# Patient Record
Sex: MALE | Race: WHITE | Employment: FULL TIME | URBAN - METROPOLITAN AREA
[De-identification: names, ages, dates, MRNs, and addresses within clinical notes are randomized per-mention and may not be internally consistent; named-entity substitution may affect disease eponyms.]

---

## 2017-10-30 ENCOUNTER — OFFICE VISIT (OUTPATIENT)
Dept: INTERNAL MEDICINE | Age: 27
End: 2017-10-30

## 2017-10-30 VITALS
BODY MASS INDEX: 34.4 KG/M2 | HEIGHT: 68 IN | OXYGEN SATURATION: 98 % | DIASTOLIC BLOOD PRESSURE: 68 MMHG | WEIGHT: 227 LBS | SYSTOLIC BLOOD PRESSURE: 136 MMHG | HEART RATE: 72 BPM

## 2017-10-30 DIAGNOSIS — D17.21 LIPOMA OF RIGHT SHOULDER: ICD-10-CM

## 2017-10-30 DIAGNOSIS — Z23 NEED FOR INFLUENZA VACCINATION: ICD-10-CM

## 2017-10-30 DIAGNOSIS — Z87.828 HISTORY OF TEAR OF ACL (ANTERIOR CRUCIATE LIGAMENT): ICD-10-CM

## 2017-10-30 DIAGNOSIS — Z00.00 ANNUAL PHYSICAL EXAM: Primary | ICD-10-CM

## 2017-10-30 DIAGNOSIS — Z98.890 HISTORY OF ELBOW SURGERY: ICD-10-CM

## 2017-10-30 DIAGNOSIS — Z00.00 ANNUAL PHYSICAL EXAM: ICD-10-CM

## 2017-10-30 LAB
A/G RATIO: 1.8 (ref 1.1–2.2)
ALBUMIN SERPL-MCNC: 4.9 G/DL (ref 3.4–5)
ALP BLD-CCNC: 37 U/L (ref 40–129)
ALT SERPL-CCNC: 27 U/L (ref 10–40)
ANION GAP SERPL CALCULATED.3IONS-SCNC: 13 MMOL/L (ref 3–16)
AST SERPL-CCNC: 22 U/L (ref 15–37)
BILIRUB SERPL-MCNC: 0.8 MG/DL (ref 0–1)
BUN BLDV-MCNC: 20 MG/DL (ref 7–20)
CALCIUM SERPL-MCNC: 9.7 MG/DL (ref 8.3–10.6)
CHLORIDE BLD-SCNC: 102 MMOL/L (ref 99–110)
CHOLESTEROL, TOTAL: 157 MG/DL (ref 0–199)
CO2: 27 MMOL/L (ref 21–32)
CREAT SERPL-MCNC: 1 MG/DL (ref 0.9–1.3)
GFR AFRICAN AMERICAN: >60
GFR NON-AFRICAN AMERICAN: >60
GLOBULIN: 2.7 G/DL
GLUCOSE BLD-MCNC: 82 MG/DL (ref 70–99)
HCT VFR BLD CALC: 47 % (ref 40.5–52.5)
HDLC SERPL-MCNC: 34 MG/DL (ref 40–60)
HEMOGLOBIN: 15.7 G/DL (ref 13.5–17.5)
LDL CHOLESTEROL CALCULATED: ABNORMAL MG/DL
LDL CHOLESTEROL DIRECT: 81 MG/DL
MCH RBC QN AUTO: 29.5 PG (ref 26–34)
MCHC RBC AUTO-ENTMCNC: 33.3 G/DL (ref 31–36)
MCV RBC AUTO: 88.6 FL (ref 80–100)
PDW BLD-RTO: 12.9 % (ref 12.4–15.4)
PLATELET # BLD: 239 K/UL (ref 135–450)
PMV BLD AUTO: 7.8 FL (ref 5–10.5)
POTASSIUM SERPL-SCNC: 4.5 MMOL/L (ref 3.5–5.1)
RBC # BLD: 5.31 M/UL (ref 4.2–5.9)
SODIUM BLD-SCNC: 142 MMOL/L (ref 136–145)
TOTAL PROTEIN: 7.6 G/DL (ref 6.4–8.2)
TRIGL SERPL-MCNC: 311 MG/DL (ref 0–150)
VLDLC SERPL CALC-MCNC: ABNORMAL MG/DL
WBC # BLD: 7.2 K/UL (ref 4–11)

## 2017-10-30 PROCEDURE — 99385 PREV VISIT NEW AGE 18-39: CPT | Performed by: INTERNAL MEDICINE

## 2017-10-30 PROCEDURE — 90630 INFLUENZA, QUADV, 18-64 YRS, ID, PF, MICRO INJ, 0.1ML (FLUZONE QUADV, PF): CPT | Performed by: INTERNAL MEDICINE

## 2017-10-30 PROCEDURE — 90471 IMMUNIZATION ADMIN: CPT | Performed by: INTERNAL MEDICINE

## 2017-10-30 ASSESSMENT — ENCOUNTER SYMPTOMS
BLOOD IN STOOL: 0
CONSTIPATION: 0
NAUSEA: 0
ABDOMINAL PAIN: 0
SHORTNESS OF BREATH: 0
CHEST TIGHTNESS: 0
DIARRHEA: 0
VOMITING: 0
COUGH: 0

## 2017-10-30 ASSESSMENT — PATIENT HEALTH QUESTIONNAIRE - PHQ9
2. FEELING DOWN, DEPRESSED OR HOPELESS: 0
SUM OF ALL RESPONSES TO PHQ9 QUESTIONS 1 & 2: 0
1. LITTLE INTEREST OR PLEASURE IN DOING THINGS: 0
SUM OF ALL RESPONSES TO PHQ QUESTIONS 1-9: 0

## 2017-10-30 NOTE — PROGRESS NOTES
appetite change, chills and fever. HENT: Negative for congestion, hearing loss and tinnitus. Eyes: Negative for visual disturbance. Respiratory: Negative for cough, chest tightness and shortness of breath. Cardiovascular: Negative for chest pain and palpitations. Gastrointestinal: Negative for abdominal pain, blood in stool, constipation, diarrhea, nausea and vomiting. Endocrine: Negative for polyuria. Genitourinary: Negative for difficulty urinating, dysuria and hematuria. Musculoskeletal: Positive for neck pain. Negative for arthralgias. Skin: Negative for rash. Neurological: Negative for weakness, numbness and headaches. Psychiatric/Behavioral: Negative for dysphoric mood, self-injury and suicidal ideas. The patient is not nervous/anxious. Screening:    Need screening for HIV ? No- he donated blood     Last eye exam: 2016    Diet is good   he runs several miles a week,   And he does wrestling. Immunization:    There is no immunization history on file for this patient. Physical Exam:      Vitals: /68 (Site: Left Arm, Position: Sitting, Cuff Size: Medium Adult)   Pulse 72   Ht 5' 8\" (1.727 m)   Wt 227 lb (103 kg)   SpO2 98%   BMI 34.52 kg/m²     Body mass index is 34.52 kg/m². Wt Readings from Last 3 Encounters:   10/30/17 227 lb (103 kg)   11/17/16 220 lb (99.8 kg)   04/07/15 200 lb (90.7 kg)     Physical Exam   Constitutional: He is oriented to person, place, and time. He appears well-developed and well-nourished. No distress. HENT:   Head: Normocephalic and atraumatic. Right Ear: External ear normal.   Left Ear: External ear normal.   Mouth/Throat: Oropharynx is clear and moist. No oropharyngeal exudate. Eyes: Conjunctivae and EOM are normal. Pupils are equal, round, and reactive to light. Right eye exhibits no discharge. Left eye exhibits no discharge. No scleral icterus. Neck: Normal range of motion. No JVD present. No thyromegaly present.    Cardiovascular: Normal rate, normal heart sounds and intact distal pulses. No murmur heard. Pulmonary/Chest: Effort normal and breath sounds normal. No respiratory distress. He has no wheezes. He has no rales. Abdominal: Soft. Bowel sounds are normal. He exhibits no distension. There is no tenderness. There is no rebound. No organomegaly  No pulsating Aorta   Musculoskeletal: Normal range of motion. He exhibits no edema or tenderness. R elbow ROM 5- 150  R knee- no effusion, lachman neg    Lymphadenopathy:     He has no cervical adenopathy. Neurological: He is alert and oriented to person, place, and time. He has normal strength. No cranial nerve deficit or sensory deficit. Gait normal. GCS eye subscore is 4. GCS verbal subscore is 5. GCS motor subscore is 6. Reflex Scores:       Tricep reflexes are 2+ on the right side and 2+ on the left side. Bicep reflexes are 2+ on the right side and 2+ on the left side. Patellar reflexes are 2+ on the right side and 2+ on the left side. Skin: No rash noted. He is not diaphoretic. No erythema. Psychiatric: He has a normal mood and affect. His behavior is normal. Thought content normal.        Assessment/Plan:   [place] was seen today for establish care. Diagnoses and all orders for this visit:    Annual physical exam  -     Comprehensive Metabolic Panel; Future  -     CBC; Future  -     Lipid Panel; Future    History of tear of ACL (anterior cruciate ligament)    History of elbow surgery    Lipoma of right shoulder  No need for intervention, I asked him to ask his orthopedic surgeon when he will see him for his neck pain. Patient was encouraged to call the office or be seen with MD for any worsening or lack of improvement.      Dustin Walker M.D.   10/30/2017, 10:23 AM

## 2017-10-31 DIAGNOSIS — E78.49 OTHER HYPERLIPIDEMIA: Primary | ICD-10-CM

## 2018-03-23 ENCOUNTER — TELEPHONE (OUTPATIENT)
Dept: INTERNAL MEDICINE | Age: 28
End: 2018-03-23

## 2018-03-23 DIAGNOSIS — R06.83 SNORING: Primary | ICD-10-CM

## 2018-05-30 ENCOUNTER — OFFICE VISIT (OUTPATIENT)
Dept: PULMONOLOGY | Age: 28
End: 2018-05-30

## 2018-05-30 VITALS
DIASTOLIC BLOOD PRESSURE: 76 MMHG | SYSTOLIC BLOOD PRESSURE: 120 MMHG | BODY MASS INDEX: 35.01 KG/M2 | OXYGEN SATURATION: 97 % | WEIGHT: 231 LBS | HEIGHT: 68 IN | HEART RATE: 70 BPM

## 2018-05-30 DIAGNOSIS — G47.10 HYPERSOMNIA: Primary | ICD-10-CM

## 2018-05-30 DIAGNOSIS — J30.9 CHRONIC ALLERGIC RHINITIS, UNSPECIFIED SEASONALITY, UNSPECIFIED TRIGGER: Chronic | ICD-10-CM

## 2018-05-30 DIAGNOSIS — K21.9 GERD WITHOUT ESOPHAGITIS: Chronic | ICD-10-CM

## 2018-05-30 DIAGNOSIS — R06.83 SNORING: ICD-10-CM

## 2018-05-30 DIAGNOSIS — E66.9 NON MORBID OBESITY, UNSPECIFIED OBESITY TYPE: Chronic | ICD-10-CM

## 2018-05-30 PROCEDURE — 99244 OFF/OP CNSLTJ NEW/EST MOD 40: CPT | Performed by: INTERNAL MEDICINE

## 2018-05-30 ASSESSMENT — ENCOUNTER SYMPTOMS
CHEST TIGHTNESS: 0
ALLERGIC/IMMUNOLOGIC NEGATIVE: 1
APNEA: 1
ABDOMINAL PAIN: 0
CHOKING: 0
SHORTNESS OF BREATH: 0
VOMITING: 0
RHINORRHEA: 0
ABDOMINAL DISTENTION: 0
PHOTOPHOBIA: 0
EYE PAIN: 0
NAUSEA: 0

## 2018-05-30 ASSESSMENT — SLEEP AND FATIGUE QUESTIONNAIRES
HOW LIKELY ARE YOU TO NOD OFF OR FALL ASLEEP WHEN YOU ARE A PASSENGER IN A CAR FOR AN HOUR WITHOUT A BREAK: 2
HOW LIKELY ARE YOU TO NOD OFF OR FALL ASLEEP IN A CAR, WHILE STOPPED FOR A FEW MINUTES IN TRAFFIC: 0
NECK CIRCUMFERENCE (INCHES): 17
HOW LIKELY ARE YOU TO NOD OFF OR FALL ASLEEP WHILE LYING DOWN TO REST IN THE AFTERNOON WHEN CIRCUMSTANCES PERMIT: 2
ESS TOTAL SCORE: 9
HOW LIKELY ARE YOU TO NOD OFF OR FALL ASLEEP WHILE SITTING AND READING: 2
HOW LIKELY ARE YOU TO NOD OFF OR FALL ASLEEP WHILE SITTING AND TALKING TO SOMEONE: 0
HOW LIKELY ARE YOU TO NOD OFF OR FALL ASLEEP WHILE WATCHING TV: 2
HOW LIKELY ARE YOU TO NOD OFF OR FALL ASLEEP WHILE SITTING INACTIVE IN A PUBLIC PLACE: 0
HOW LIKELY ARE YOU TO NOD OFF OR FALL ASLEEP WHILE SITTING QUIETLY AFTER LUNCH WITHOUT ALCOHOL: 1

## 2018-06-21 ENCOUNTER — HOSPITAL ENCOUNTER (OUTPATIENT)
Dept: SLEEP MEDICINE | Age: 28
Discharge: HOME | End: 2018-06-23
Attending: INTERNAL MEDICINE | Admitting: INTERNAL MEDICINE

## 2018-06-21 DIAGNOSIS — R06.83 SNORING: ICD-10-CM

## 2018-06-21 DIAGNOSIS — G47.10 HYPERSOMNIA: ICD-10-CM

## 2018-06-21 PROCEDURE — 95806 SLEEP STUDY UNATT&RESP EFFT: CPT | Performed by: INTERNAL MEDICINE

## 2018-07-03 ENCOUNTER — TELEPHONE (OUTPATIENT)
Dept: SLEEP MEDICINE | Age: 28
End: 2018-07-03

## 2018-07-03 NOTE — TELEPHONE ENCOUNTER
Pt returning call to review PSG and order unit. Pt does not have a DME preference and order to be sent to [facility]. F/U scheduled and pt was asked to bring unit.

## 2018-09-04 ENCOUNTER — TELEPHONE (OUTPATIENT)
Dept: INTERNAL MEDICINE | Age: 28
End: 2018-09-04

## 2018-09-04 NOTE — TELEPHONE ENCOUNTER
Patients father has issue with heart valve that may be hereditary. Patient would like to know if he can go for u/s to see if he has the defect.

## 2018-09-06 ENCOUNTER — OFFICE VISIT (OUTPATIENT)
Dept: PULMONOLOGY | Age: 28
End: 2018-09-06

## 2018-09-06 VITALS
DIASTOLIC BLOOD PRESSURE: 72 MMHG | HEART RATE: 80 BPM | BODY MASS INDEX: 34.1 KG/M2 | OXYGEN SATURATION: 98 % | WEIGHT: 225 LBS | HEIGHT: 68 IN | SYSTOLIC BLOOD PRESSURE: 122 MMHG

## 2018-09-06 DIAGNOSIS — G47.33 OSA (OBSTRUCTIVE SLEEP APNEA): Primary | ICD-10-CM

## 2018-09-06 DIAGNOSIS — K21.9 GERD WITHOUT ESOPHAGITIS: Chronic | ICD-10-CM

## 2018-09-06 DIAGNOSIS — E66.9 NON MORBID OBESITY, UNSPECIFIED OBESITY TYPE: Chronic | ICD-10-CM

## 2018-09-06 PROCEDURE — 99214 OFFICE O/P EST MOD 30 MIN: CPT | Performed by: NURSE PRACTITIONER

## 2018-09-06 ASSESSMENT — ENCOUNTER SYMPTOMS
COUGH: 0
ABDOMINAL DISTENTION: 0
APNEA: 0
ABDOMINAL PAIN: 0
RHINORRHEA: 0
SHORTNESS OF BREATH: 0
SINUS PRESSURE: 0

## 2018-09-06 ASSESSMENT — SLEEP AND FATIGUE QUESTIONNAIRES
HOW LIKELY ARE YOU TO NOD OFF OR FALL ASLEEP WHILE SITTING INACTIVE IN A PUBLIC PLACE: 0
HOW LIKELY ARE YOU TO NOD OFF OR FALL ASLEEP WHILE WATCHING TV: 2
HOW LIKELY ARE YOU TO NOD OFF OR FALL ASLEEP WHEN YOU ARE A PASSENGER IN A CAR FOR AN HOUR WITHOUT A BREAK: 1
HOW LIKELY ARE YOU TO NOD OFF OR FALL ASLEEP WHILE SITTING AND TALKING TO SOMEONE: 0
HOW LIKELY ARE YOU TO NOD OFF OR FALL ASLEEP WHILE SITTING QUIETLY AFTER LUNCH WITHOUT ALCOHOL: 1
HOW LIKELY ARE YOU TO NOD OFF OR FALL ASLEEP WHILE SITTING AND READING: 2
HOW LIKELY ARE YOU TO NOD OFF OR FALL ASLEEP WHILE LYING DOWN TO REST IN THE AFTERNOON WHEN CIRCUMSTANCES PERMIT: 2
ESS TOTAL SCORE: 8
HOW LIKELY ARE YOU TO NOD OFF OR FALL ASLEEP IN A CAR, WHILE STOPPED FOR A FEW MINUTES IN TRAFFIC: 0

## 2018-09-06 NOTE — TELEPHONE ENCOUNTER
Spoke to pt stated that his father had valve to the aorta were slightly fused together so overtime he had weak. aneurysm on the aorta. It bloomed out but did not bust. A couple years ago during a health screening they found out it and they had to replace the valve. They were concerned about the valve issue being hereditary.

## 2018-09-06 NOTE — TELEPHONE ENCOUNTER
Pt states his fathers records are in [facility] system. Pt will ask his father to release his info to us so that we can review his records.

## 2018-09-06 NOTE — PROGRESS NOTES
Josué Fontana MD, FAASM, [facility]  Baron Ding, MSN, RN, CNP [address]  3rd Floor, [address] [facility], [address] [phone]   [address] [place] [address] Dr Pricila Mcghee . Sissy Fernández [address] [phone]       [address] MEDICINE    Subjective:     Patient ID: Bronwyn Bonilla is a 33 y.o. male. Chief Complaint   Patient presents with    Sleep Apnea       HPI:      Had study Insurance mandated HST done on 6/23/2018 which showed an RHI - 5.3/hr with Low SaO2 - 91% and time below 90% of 0min. This is consistent with mild JULIAN (327.23)    Machine Present in office today: No and information per active modem      Machine Modem/Download Info:  Compliance (hours/night): 5.75 hrs/night  Download AHI (/hour): 2.3 /HR  Average CPAP Pressure : 8.3 cmH2O           APAP - Settings  Pressure Min: 7 cmH2O  Pressure Max: 17 cmH2O                 Comfort Settings  Humidity Level (0-8): 3  Heated Tubing (Yes/No): Yes  Flex/EPR (0-3): 3 PAP Mask  Mask Type: Nasal mask  Mask Model: Dreaem Wear       [place] - Total score: 8    Follow-up :     Last Visit : May 2018    Per patient the listed Co-morbidities are well controlled and stable at this time. Subjective Health Changes: None     Follow-up :      Over Night Oximetry: [] Yes  [] No  [x] NA   Using O2: [] Yes  [] No  [x] NA   Patient is compliant with the machine  [x] Yes  [] No   Feeling rested when using the machine   [x] Yes  [] No     Pressure is comfortable with inspiration and expiration  [x] Yes  [] No     Noticed changes in pressure   [] Yes  [] No  [x] NA   Mask is fitting well  [x] Yes  [] No   Noting Mask Air Leak  [] Yes  [x] No   Having painful Aerophagia  [] Yes  [x] No   Nocturia   0-1  per night.    Having  HA upon waking  [] Yes  [x] No   Dry mouth upon waking   [] Yes  [x] No   Congestion upon waking   [] Yes  [x] No    Nose Bleeds  [] Yes  [x] No   Using Sleep Aides    [x] NA

## 2018-10-02 ENCOUNTER — TELEPHONE (OUTPATIENT)
Dept: INTERNAL MEDICINE CLINIC | Age: 28
End: 2018-10-02

## 2018-10-02 DIAGNOSIS — Z82.49 FAMILY HISTORY OF AORTIC ANEURYSM: ICD-10-CM

## 2018-10-02 DIAGNOSIS — Z82.79 FAMILY HISTORY OF BICUSPID AORTIC VALVE: Primary | ICD-10-CM

## 2018-10-16 ENCOUNTER — HOSPITAL ENCOUNTER (OUTPATIENT)
Dept: NON INVASIVE DIAGNOSTICS | Age: 28
Discharge: HOME | End: 2018-10-16
Payer: COMMERCIAL

## 2018-10-16 DIAGNOSIS — Z82.49 FAMILY HISTORY OF AORTIC ANEURYSM: ICD-10-CM

## 2018-10-16 DIAGNOSIS — Z82.79 FAMILY HISTORY OF BICUSPID AORTIC VALVE: ICD-10-CM

## 2018-10-16 LAB
LEFT VENTRICULAR EJECTION FRACTION HIGH VALUE: 55 %
LEFT VENTRICULAR EJECTION FRACTION MODE: NORMAL
LV EF: 55 %
LVEF MODALITY: NORMAL

## 2018-10-16 PROCEDURE — 93306 TTE W/DOPPLER COMPLETE: CPT

## 2018-11-05 ENCOUNTER — TELEPHONE (OUTPATIENT)
Dept: INTERNAL MEDICINE CLINIC | Age: 28
End: 2018-11-05

## 2018-11-06 ENCOUNTER — OFFICE VISIT (OUTPATIENT)
Dept: INTERNAL MEDICINE CLINIC | Age: 28
End: 2018-11-06
Payer: COMMERCIAL

## 2018-11-06 VITALS
BODY MASS INDEX: 34.21 KG/M2 | HEART RATE: 77 BPM | SYSTOLIC BLOOD PRESSURE: 112 MMHG | WEIGHT: 225 LBS | DIASTOLIC BLOOD PRESSURE: 68 MMHG | OXYGEN SATURATION: 98 %

## 2018-11-06 DIAGNOSIS — K21.9 GERD WITHOUT ESOPHAGITIS: Chronic | ICD-10-CM

## 2018-11-06 DIAGNOSIS — Z00.00 ANNUAL PHYSICAL EXAM: Primary | ICD-10-CM

## 2018-11-06 DIAGNOSIS — E78.1 HYPERTRIGLYCERIDEMIA: ICD-10-CM

## 2018-11-06 DIAGNOSIS — J30.9 CHRONIC ALLERGIC RHINITIS: Chronic | ICD-10-CM

## 2018-11-06 PROCEDURE — 99395 PREV VISIT EST AGE 18-39: CPT | Performed by: INTERNAL MEDICINE

## 2018-11-06 ASSESSMENT — ENCOUNTER SYMPTOMS
BLOOD IN STOOL: 0
SHORTNESS OF BREATH: 0
CHEST TIGHTNESS: 0
NAUSEA: 0
ABDOMINAL PAIN: 0
VOMITING: 0

## 2018-11-06 ASSESSMENT — PATIENT HEALTH QUESTIONNAIRE - PHQ9
SUM OF ALL RESPONSES TO PHQ QUESTIONS 1-9: 0
SUM OF ALL RESPONSES TO PHQ QUESTIONS 1-9: 0
1. LITTLE INTEREST OR PLEASURE IN DOING THINGS: 0
2. FEELING DOWN, DEPRESSED OR HOPELESS: 0
SUM OF ALL RESPONSES TO PHQ9 QUESTIONS 1 & 2: 0

## 2018-11-06 NOTE — PROGRESS NOTES
Outpatient Note for established Patient - annual physical     History Obtained From:  patient, electronic medical record  Is the patient new to me ? - No    HISTORY OF PRESENT ILLNESS:   The patient is a 33 y.o. male who is here today for :  Radha Mathews was seen today for annual exam.    Diagnoses and all orders for this visit:    Annual physical    GERD without esophagitis  He is on Zantac daily   He takes 300 mg in the morning  He has been taking that for a few years   He had EGD ~ 2 years ago. He will try to look for the GI doctor. Pt denies CP/SOB/palpitations/abdominal pain/N/V. Chronic allergic rhinitis  Nasal congestion. Neck pain- known 2 herniated discs  He has seen a spine surgeon through [address] [place] [address]  He had injections in the past which helped transiently. He is not  Sure if he wants to resume injections  He did PT in the past and is not doing it by himself. No numbness/ weakness in hands/ legs. Last eye exam- 8/2019  He wants to be screened for HIV  He works as     L shoulder pain and neck pain  Mild  No specific trauma  No numbness/ weakness hands/ legs         Past Medical History:        Diagnosis Date    Chronic allergic rhinitis 5/30/2018    GERD without esophagitis 5/30/2018       Social History:   TOBACCO:   reports that he has never smoked. He has never used smokeless tobacco.  ETOH:   reports that he drinks about 1.2 oz of alcohol per week . Current Medications:    Prior to Admission medications    Medication Sig Start Date End Date Taking? Authorizing Provider   ranitidine (ZANTAC) 150 MG tablet Take 150 mg by mouth 2 times daily    Historical Provider, MD   cetirizine (ZYRTEC) 10 MG tablet Take 10 mg by mouth daily. Historical Provider, MD       REVIEW OF SYSTEMS:  Review of Systems   Constitutional: Negative for activity change, appetite change, chills, fever and unexpected weight change. HENT: Negative for hearing loss.     Eyes: Negative for visual disturbance. Respiratory: Negative for chest tightness and shortness of breath. Cardiovascular: Negative for chest pain. Gastrointestinal: Negative for abdominal pain, blood in stool, nausea and vomiting. Chronic GERD    Genitourinary: Negative for hematuria. Musculoskeletal: Positive for neck pain. Skin: Negative for rash. Allergic/Immunologic: Negative for immunocompromised state. Neurological: Negative for dizziness and headaches. Psychiatric/Behavioral: Negative for dysphoric mood and suicidal ideas. Physical Exam:      Vitals: /68 (Site: Left Upper Arm, Position: Sitting, Cuff Size: Large Adult)   Pulse 77   Wt 225 lb (102.1 kg)   SpO2 98%   BMI 34.21 kg/m²     Body mass index is 34.21 kg/m². Wt Readings from Last 3 Encounters:   11/06/18 225 lb (102.1 kg)   09/06/18 225 lb (102.1 kg)   05/30/18 231 lb (104.8 kg)     Physical Exam   Constitutional: He is oriented to person, place, and time. He appears well-developed and well-nourished. No distress. HENT:   Head: Normocephalic and atraumatic. Mouth/Throat: Oropharynx is clear and moist. No oropharyngeal exudate. Eyes: Conjunctivae and EOM are normal. Right eye exhibits no discharge. Left eye exhibits no discharge. No scleral icterus. Neck: Normal range of motion. Neck supple. Cardiovascular: Normal rate and normal heart sounds. No murmur heard. Pulmonary/Chest: Effort normal and breath sounds normal. No respiratory distress. He has no wheezes. He has no rales. Abdominal: Soft. He exhibits no distension. There is no tenderness. Musculoskeletal: He exhibits no deformity. Shoulder:  No swelling/ redness. No bony tenderness  Mild tenderness with IR/ ABD     Lymphadenopathy:        Head (right side): No submental and no submandibular adenopathy present. Head (left side): No submental and no submandibular adenopathy present. He has no cervical adenopathy.         Right cervical: No this exercise. 1. Hold on to a table or the back of a chair with your good arm. Then bend forward a little and let your sore arm hang straight down. This exercise does not use the arm muscles. Rather, use your legs and your hips to create movement that makes your arm swing freely. 2. Use the movement from your hips and legs to guide the slightly swinging arm back and forth like a pendulum (or elephant trunk). Then guide it in circles that start small (about the size of a dinner plate). Make the circles a bit larger each day, as your pain allows. 3. Do this exercise for 5 minutes, 5 to 7 times each day. 4. As you have less pain, try bending over a little farther to do this exercise. This will increase the amount of movement at your shoulder. Posterior stretching exercise    1. Hold the elbow of your injured arm with your other hand. 2. Use your hand to pull your injured arm gently up and across your body. You will feel a gentle stretch across the back of your injured shoulder. 3. Hold for at least 15 to 30 seconds. Then slowly lower your arm. 4. Repeat 2 to 4 times. Up-the-back stretch    Your doctor or physical therapist may want you to wait to do this stretch until you have regained most of your range of motion and strength. You can do this stretch in different ways. Hold any of these stretches for at least 15 to 30 seconds. Repeat them 2 to 4 times. 1. Put your hand in your back pocket. Let it rest there to stretch your shoulder. 2. With your other hand, hold your injured arm (palm outward) behind your back by the wrist. Pull your arm up gently to stretch your shoulder. 3. Next, put a towel over your other shoulder. Put the hand of your injured arm behind your back. Now hold the back end of the towel. With the other hand, hold the front end of the towel in front of your body. Pull gently on the front end of the towel. This will bring your hand farther up your back to stretch your shoulder.   Overhead you move your arm up. 3. Hold that position for a count of at least 15 to 20.  4. Walk your fingers back down to the starting position. 5. Repeat at least 2 to 4 times. Try to reach higher each time. Wall climbing (to the front)    During this stretching exercise, be careful not to arch your back. 1. Face a wall, and stand so your fingers can just touch it. 2. Keeping your shoulder down, walk the fingers of your injured arm up the wall as high as pain permits. (Don't shrug your shoulder up toward your ear.)  3. Hold your arm in that position for at least 15 to 30 seconds. 4. Slowly walk your fingers back down to where you started. 5. Repeat at least 2 to 4 times. Try to reach higher each time. Shoulder blade squeeze    1. Stand with your arms at your sides, and squeeze your shoulder blades together. Do not raise your shoulders up as you squeeze. 2. Hold 6 seconds. 3. Repeat 8 to 12 times. Scapular exercise: Arm reach    1. Lie flat on your back. This exercise is a very slight motion that starts with your arms raised (elbows straight, arms straight). 2. From this position, reach higher toward the willard or ceiling. Keep your elbows straight. All motion should be from your shoulder blade only. 3. Relax your arms back to where you started. 4. Repeat 8 to 12 times. Arm raise to the side    During this strengthening exercise, your arm should stay about 30 degrees to the front of your side. 1. Slowly raise your injured arm to the side, with your thumb facing up. Raise your arm 60 degrees at the most (shoulder level is 90 degrees). 2. Hold the position for 3 to 5 seconds. Then lower your arm back to your side. If you need to, bring your \"good\" arm across your body and place it under the elbow as you lower your injured arm. Use your good arm to keep your injured arm from dropping down too fast.  3. Repeat 8 to 12 times. 4. When you first start out, don't hold any extra weight in your hand.  As you get stronger, you may use a 1-pound to 2-pound dumbbell or a small can of food. Shoulder flexor and extensor exercise    These are isometric exercises. That means you contract your muscles without actually moving. 1. Push forward (flex): Stand facing a wall or doorjamb, about 6 inches or less back. Hold your injured arm against your body. Make a closed fist with your thumb on top. Then gently push your hand forward into the wall with about 25% to 50% of your strength. Don't let your body move backward as you push. Hold for about 6 seconds. Relax for a few seconds. Repeat 8 to 12 times. 2. Push backward (extend): Stand with your back flat against a wall. Your upper arm should be against the wall, with your elbow bent 90 degrees (your hand straight ahead). Push your elbow gently back against the wall with about 25% to 50% of your strength. Don't let your body move forward as you push. Hold for about 6 seconds. Relax for a few seconds. Repeat 8 to 12 times. Scapular exercise: Wall push-ups    This exercise is best done with your fingers somewhat turned out, rather than straight up and down. 1. Stand facing a wall, about 12 inches to 18 inches away. 2. Place your hands on the wall at shoulder height. 3. Slowly bend your elbows and bring your face to the wall. Keep your back and hips straight. 4. Push back to where you started. 5. Repeat 8 to 12 times. 6. When you can do this exercise against a wall comfortably, you can try it against a counter. You can then slowly progress to the end of a couch, then to a sturdy chair, and finally to the floor. Scapular exercise: Retraction    For this exercise, you will need elastic exercise material, such as surgical tubing or Thera-Band. 1. Put the band around a solid object at about waist level. (A bedpost will work well.) Each hand should hold an end of the band. 2. With your elbows at your sides and bent to 90 degrees, pull the band back.  Your shoulder blades should move times.  Follow-up care is a key part of your treatment and safety. Be sure to make and go to all appointments, and call your doctor if you are having problems. It's also a good idea to know your test results and keep a list of the medicines you take. Where can you learn more? Go to https://chpepiceweb.iodine. org and sign in to your [employer] account. Enter Cooper Mcdonald in the [employer]ashFolio box to learn more about \"Rotator Cuff: Exercises. \"     If you do not have an account, please click on the \"Sign Up Now\" link. Current as of: November 29, 2017  Content Version: 11.7  © 7218-3687 [employer]. Care instructions adapted under license by [facility] ([facility]). If you have questions about a medical condition or this instruction, always ask your healthcare professional. [place] [address] any warranty or liability for your use of this information.            Suzanne Osgood, M.D.   11/16/2018, 6:20 PM

## 2019-03-14 ENCOUNTER — OFFICE VISIT (OUTPATIENT)
Dept: PULMONOLOGY | Age: 29
End: 2019-03-14
Payer: COMMERCIAL

## 2019-03-14 VITALS
OXYGEN SATURATION: 99 % | WEIGHT: 221 LBS | HEART RATE: 60 BPM | HEIGHT: 68 IN | BODY MASS INDEX: 33.49 KG/M2 | SYSTOLIC BLOOD PRESSURE: 122 MMHG | DIASTOLIC BLOOD PRESSURE: 72 MMHG

## 2019-03-14 DIAGNOSIS — E66.9 NON MORBID OBESITY, UNSPECIFIED OBESITY TYPE: Chronic | ICD-10-CM

## 2019-03-14 DIAGNOSIS — K21.9 GERD WITHOUT ESOPHAGITIS: Chronic | ICD-10-CM

## 2019-03-14 DIAGNOSIS — G47.33 OSA (OBSTRUCTIVE SLEEP APNEA): Primary | ICD-10-CM

## 2019-03-14 DIAGNOSIS — J30.9 CHRONIC ALLERGIC RHINITIS: Chronic | ICD-10-CM

## 2019-03-14 PROCEDURE — 99214 OFFICE O/P EST MOD 30 MIN: CPT | Performed by: NURSE PRACTITIONER

## 2019-03-14 ASSESSMENT — ENCOUNTER SYMPTOMS
COUGH: 0
APNEA: 0
EYE REDNESS: 0
SHORTNESS OF BREATH: 0
ABDOMINAL PAIN: 0
EYE PAIN: 0
RHINORRHEA: 0
ABDOMINAL DISTENTION: 0
SINUS PRESSURE: 0

## 2019-03-14 ASSESSMENT — SLEEP AND FATIGUE QUESTIONNAIRES
HOW LIKELY ARE YOU TO NOD OFF OR FALL ASLEEP IN A CAR, WHILE STOPPED FOR A FEW MINUTES IN TRAFFIC: 0
HOW LIKELY ARE YOU TO NOD OFF OR FALL ASLEEP WHILE LYING DOWN TO REST IN THE AFTERNOON WHEN CIRCUMSTANCES PERMIT: 1
HOW LIKELY ARE YOU TO NOD OFF OR FALL ASLEEP WHILE SITTING AND READING: 1
HOW LIKELY ARE YOU TO NOD OFF OR FALL ASLEEP WHILE SITTING QUIETLY AFTER LUNCH WITHOUT ALCOHOL: 0
ESS TOTAL SCORE: 4
HOW LIKELY ARE YOU TO NOD OFF OR FALL ASLEEP WHILE SITTING INACTIVE IN A PUBLIC PLACE: 0
HOW LIKELY ARE YOU TO NOD OFF OR FALL ASLEEP WHILE WATCHING TV: 1
HOW LIKELY ARE YOU TO NOD OFF OR FALL ASLEEP WHILE SITTING AND TALKING TO SOMEONE: 0
HOW LIKELY ARE YOU TO NOD OFF OR FALL ASLEEP WHEN YOU ARE A PASSENGER IN A CAR FOR AN HOUR WITHOUT A BREAK: 1

## 2019-04-22 ENCOUNTER — TELEPHONE (OUTPATIENT)
Dept: PULMONOLOGY | Age: 29
End: 2019-04-22

## 2019-04-29 ENCOUNTER — PATIENT MESSAGE (OUTPATIENT)
Dept: INTERNAL MEDICINE CLINIC | Age: 29
End: 2019-04-29

## 2019-04-30 ENCOUNTER — TELEPHONE (OUTPATIENT)
Dept: INTERNAL MEDICINE CLINIC | Age: 29
End: 2019-04-30

## 2019-04-30 NOTE — TELEPHONE ENCOUNTER
Non-Urgent Medical Question     From  Magda Olmedo To  [facility] Practice Support Sent  4/29/2019  6:21 PM   ----- Message from [address] [place] [address] [address], [address] sent at 4/29/2019  6:21 PM EDT -----     Ruben,   Would I be able to request a referral to a dermatologist? For several years now I have had a bump on my right shoulder that I believe is a sebaceous cyst. It has never bothered me so I have left it alone. Recently I've decided it is time to have it looked at and taken care of.    Thank you,   Max Singletary

## 2019-06-10 ENCOUNTER — OFFICE VISIT (OUTPATIENT)
Dept: INTERNAL MEDICINE CLINIC | Age: 29
End: 2019-06-10
Payer: COMMERCIAL

## 2019-06-10 VITALS
DIASTOLIC BLOOD PRESSURE: 74 MMHG | BODY MASS INDEX: 32.58 KG/M2 | WEIGHT: 215 LBS | HEIGHT: 68 IN | HEART RATE: 70 BPM | SYSTOLIC BLOOD PRESSURE: 130 MMHG | OXYGEN SATURATION: 99 % | RESPIRATION RATE: 14 BRPM

## 2019-06-10 DIAGNOSIS — S16.1XXA ACUTE STRAIN OF NECK MUSCLE, INITIAL ENCOUNTER: Primary | ICD-10-CM

## 2019-06-10 PROCEDURE — 99213 OFFICE O/P EST LOW 20 MIN: CPT | Performed by: INTERNAL MEDICINE

## 2019-06-10 RX ORDER — NAPROXEN SODIUM 220 MG
220 TABLET ORAL 2 TIMES DAILY PRN
COMMUNITY
End: 2020-11-02

## 2019-06-10 ASSESSMENT — ENCOUNTER SYMPTOMS
NAUSEA: 0
CHEST TIGHTNESS: 0
SHORTNESS OF BREATH: 0
ABDOMINAL PAIN: 0
EYE REDNESS: 0
BACK PAIN: 0

## 2019-06-10 ASSESSMENT — PATIENT HEALTH QUESTIONNAIRE - PHQ9
SUM OF ALL RESPONSES TO PHQ QUESTIONS 1-9: 0
2. FEELING DOWN, DEPRESSED OR HOPELESS: 0
1. LITTLE INTEREST OR PLEASURE IN DOING THINGS: 0
SUM OF ALL RESPONSES TO PHQ QUESTIONS 1-9: 0
SUM OF ALL RESPONSES TO PHQ9 QUESTIONS 1 & 2: 0
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
## 2019-09-26 ENCOUNTER — OFFICE VISIT (OUTPATIENT)
Dept: PULMONOLOGY | Age: 29
End: 2019-09-26
Payer: COMMERCIAL

## 2019-09-26 VITALS
SYSTOLIC BLOOD PRESSURE: 112 MMHG | WEIGHT: 215 LBS | DIASTOLIC BLOOD PRESSURE: 70 MMHG | HEIGHT: 68 IN | HEART RATE: 61 BPM | OXYGEN SATURATION: 99 % | BODY MASS INDEX: 32.58 KG/M2

## 2019-09-26 DIAGNOSIS — E66.9 NON MORBID OBESITY, UNSPECIFIED OBESITY TYPE: Chronic | ICD-10-CM

## 2019-09-26 DIAGNOSIS — G47.33 OSA (OBSTRUCTIVE SLEEP APNEA): Primary | ICD-10-CM

## 2019-09-26 DIAGNOSIS — J30.9 CHRONIC ALLERGIC RHINITIS: Chronic | ICD-10-CM

## 2019-09-26 DIAGNOSIS — K21.9 GERD WITHOUT ESOPHAGITIS: Chronic | ICD-10-CM

## 2019-09-26 PROCEDURE — 99214 OFFICE O/P EST MOD 30 MIN: CPT | Performed by: NURSE PRACTITIONER

## 2019-09-26 ASSESSMENT — ENCOUNTER SYMPTOMS
ABDOMINAL PAIN: 0
EYE PAIN: 0
COUGH: 0
SHORTNESS OF BREATH: 0
RHINORRHEA: 0
SINUS PRESSURE: 0
EYE REDNESS: 0
APNEA: 0
ABDOMINAL DISTENTION: 0

## 2019-09-26 ASSESSMENT — SLEEP AND FATIGUE QUESTIONNAIRES
ESS TOTAL SCORE: 4
HOW LIKELY ARE YOU TO NOD OFF OR FALL ASLEEP WHILE SITTING INACTIVE IN A PUBLIC PLACE: 0
HOW LIKELY ARE YOU TO NOD OFF OR FALL ASLEEP WHILE SITTING AND TALKING TO SOMEONE: 0
HOW LIKELY ARE YOU TO NOD OFF OR FALL ASLEEP IN A CAR, WHILE STOPPED FOR A FEW MINUTES IN TRAFFIC: 0
HOW LIKELY ARE YOU TO NOD OFF OR FALL ASLEEP WHILE SITTING AND READING: 1
HOW LIKELY ARE YOU TO NOD OFF OR FALL ASLEEP WHEN YOU ARE A PASSENGER IN A CAR FOR AN HOUR WITHOUT A BREAK: 0
HOW LIKELY ARE YOU TO NOD OFF OR FALL ASLEEP WHILE WATCHING TV: 1
HOW LIKELY ARE YOU TO NOD OFF OR FALL ASLEEP WHILE LYING DOWN TO REST IN THE AFTERNOON WHEN CIRCUMSTANCES PERMIT: 1
HOW LIKELY ARE YOU TO NOD OFF OR FALL ASLEEP WHILE SITTING QUIETLY AFTER LUNCH WITHOUT ALCOHOL: 1

## 2019-09-26 NOTE — PROGRESS NOTES
Myles Powell MD, FAASM, [facility]  Estrella Thomas, MSN, RN, CNP     [address] [facility] SLEEP MEDICINE  [place] [address] [address] [facility] [address]  Dept: [phone]  Dept Fax: [phone]: [facility] SLEEP [facility]IC[facility]  [address] [place] [id number] [phone]    Subjective:     Patient ID: Clyde Schultz is a 30 y.o. male. Chief Complaint   Patient presents with    Sleep Apnea       HPI:      Machine Present today:  No SD card was brought to the office    Machine Modem/Download Info:  Compliance (hours/night): 6 hrs/night  Download AHI (/hour): 1.9 /HR  Average CPAP Pressure : 8.8 cmH2O           APAP - Settings  Pressure Min: 7 cmH2O  Pressure Max: 17 cmH2O                 Comfort Settings  Humidity Level (0-8): 5  Flex/EPR (0-3): 3       [place] - Total score: 4    Follow-up :     Last Visit : March 2019      Patient reports the listed chronic Co-morbidities: GERD, Obesity, Allergies    are well controlled and stable at this time. Subjective Health Changes: none     Over Night Oximetry: [] Yes  [] No  [x] NA   Using O2: [] Yes  [] No  [x] NA   Patient is compliant with the machine  [x] Yes  [] No   Feeling rested when using the machine   [x] Yes  [] No     Pressure is comfortable with inspiration and expiration  [x] Yes  [] No     Noticed changes in pressure   [] Yes  [] No  [x] NA   Mask is fitting well  [x] Yes  [] No   Noting Mask Air Leak  [] Yes  [x] No   Having painful Aerophagia  [] Yes  [x] No   Nocturia   0  per night.    Having  HA upon waking  [] Yes  [x] No   Dry mouth upon waking   Dry Nose  Dry Eyes  [x] Yes  [] No   Congestion upon waking   [x] Yes  [] No    Nose Bleeds  [] Yes  [x] No   Using Sleep Aides    [x] NA   Understands how to change humidification and/or tubing temperature for comfort while at home  [x] Yes  [] No     Difficulties falling asleep  [] Yes  [x] No   Difficulties

## 2019-11-20 ENCOUNTER — OFFICE VISIT (OUTPATIENT)
Dept: INTERNAL MEDICINE CLINIC | Age: 29
End: 2019-11-20
Payer: COMMERCIAL

## 2019-11-20 VITALS
HEART RATE: 73 BPM | WEIGHT: 214 LBS | BODY MASS INDEX: 32.54 KG/M2 | SYSTOLIC BLOOD PRESSURE: 122 MMHG | OXYGEN SATURATION: 98 % | DIASTOLIC BLOOD PRESSURE: 82 MMHG

## 2019-11-20 DIAGNOSIS — E66.9 NON MORBID OBESITY, UNSPECIFIED OBESITY TYPE: Chronic | ICD-10-CM

## 2019-11-20 DIAGNOSIS — G47.33 OSA (OBSTRUCTIVE SLEEP APNEA): ICD-10-CM

## 2019-11-20 DIAGNOSIS — Z11.4 SCREENING FOR HIV (HUMAN IMMUNODEFICIENCY VIRUS): ICD-10-CM

## 2019-11-20 DIAGNOSIS — Z23 NEED FOR PROPHYLACTIC VACCINATION AGAINST DIPHTHERIA-TETANUS-PERTUSSIS (DTP): ICD-10-CM

## 2019-11-20 DIAGNOSIS — Z00.00 ANNUAL PHYSICAL EXAM: ICD-10-CM

## 2019-11-20 DIAGNOSIS — M54.2 NECK PAIN: ICD-10-CM

## 2019-11-20 DIAGNOSIS — G47.10 HYPERSOMNIA: ICD-10-CM

## 2019-11-20 DIAGNOSIS — K21.9 GERD WITHOUT ESOPHAGITIS: Chronic | ICD-10-CM

## 2019-11-20 DIAGNOSIS — Z00.00 ANNUAL PHYSICAL EXAM: Primary | ICD-10-CM

## 2019-11-20 LAB
A/G RATIO: 1.9 (ref 1.1–2.2)
ALBUMIN SERPL-MCNC: 4.7 G/DL (ref 3.4–5)
ALP BLD-CCNC: 27 U/L (ref 40–129)
ALT SERPL-CCNC: 23 U/L (ref 10–40)
ANION GAP SERPL CALCULATED.3IONS-SCNC: 13 MMOL/L (ref 3–16)
AST SERPL-CCNC: 22 U/L (ref 15–37)
BASOPHILS ABSOLUTE: 0 K/UL (ref 0–0.2)
BASOPHILS RELATIVE PERCENT: 0.7 %
BILIRUB SERPL-MCNC: 1 MG/DL (ref 0–1)
BUN BLDV-MCNC: 18 MG/DL (ref 7–20)
CALCIUM SERPL-MCNC: 9.2 MG/DL (ref 8.3–10.6)
CHLORIDE BLD-SCNC: 105 MMOL/L (ref 99–110)
CHOLESTEROL, TOTAL: 154 MG/DL (ref 0–199)
CO2: 25 MMOL/L (ref 21–32)
CREAT SERPL-MCNC: 1.1 MG/DL (ref 0.9–1.3)
EOSINOPHILS ABSOLUTE: 0.2 K/UL (ref 0–0.6)
EOSINOPHILS RELATIVE PERCENT: 3.8 %
GFR AFRICAN AMERICAN: >60
GFR NON-AFRICAN AMERICAN: >60
GLOBULIN: 2.5 G/DL
GLUCOSE BLD-MCNC: 83 MG/DL (ref 70–99)
HCT VFR BLD CALC: 41.4 % (ref 40.5–52.5)
HDLC SERPL-MCNC: 42 MG/DL (ref 40–60)
HEMOGLOBIN: 13.9 G/DL (ref 13.5–17.5)
LDL CHOLESTEROL CALCULATED: 88 MG/DL
LYMPHOCYTES ABSOLUTE: 1.3 K/UL (ref 1–5.1)
LYMPHOCYTES RELATIVE PERCENT: 28.4 %
MCH RBC QN AUTO: 29.2 PG (ref 26–34)
MCHC RBC AUTO-ENTMCNC: 33.7 G/DL (ref 31–36)
MCV RBC AUTO: 86.6 FL (ref 80–100)
MONOCYTES ABSOLUTE: 0.4 K/UL (ref 0–1.3)
MONOCYTES RELATIVE PERCENT: 9.4 %
NEUTROPHILS ABSOLUTE: 2.6 K/UL (ref 1.7–7.7)
NEUTROPHILS RELATIVE PERCENT: 57.7 %
PDW BLD-RTO: 13 % (ref 12.4–15.4)
PLATELET # BLD: 207 K/UL (ref 135–450)
PMV BLD AUTO: 7.6 FL (ref 5–10.5)
POTASSIUM SERPL-SCNC: 4.4 MMOL/L (ref 3.5–5.1)
RBC # BLD: 4.78 M/UL (ref 4.2–5.9)
SODIUM BLD-SCNC: 143 MMOL/L (ref 136–145)
TOTAL PROTEIN: 7.2 G/DL (ref 6.4–8.2)
TRIGL SERPL-MCNC: 122 MG/DL (ref 0–150)
VLDLC SERPL CALC-MCNC: 24 MG/DL
WBC # BLD: 4.6 K/UL (ref 4–11)

## 2019-11-20 PROCEDURE — 99395 PREV VISIT EST AGE 18-39: CPT | Performed by: INTERNAL MEDICINE

## 2019-11-20 PROCEDURE — 90715 TDAP VACCINE 7 YRS/> IM: CPT | Performed by: INTERNAL MEDICINE

## 2019-11-20 PROCEDURE — 90471 IMMUNIZATION ADMIN: CPT | Performed by: INTERNAL MEDICINE

## 2019-11-20 ASSESSMENT — ENCOUNTER SYMPTOMS
CONSTIPATION: 0
BLOOD IN STOOL: 0
ABDOMINAL PAIN: 0
CHEST TIGHTNESS: 0
SHORTNESS OF BREATH: 0
VOMITING: 0
BACK PAIN: 1
DIARRHEA: 0
NAUSEA: 0

## 2019-11-21 ENCOUNTER — TELEPHONE (OUTPATIENT)
Dept: INTERNAL MEDICINE CLINIC | Age: 29
End: 2019-11-21

## 2019-11-21 ENCOUNTER — PATIENT MESSAGE (OUTPATIENT)
Dept: INTERNAL MEDICINE CLINIC | Age: 29
End: 2019-11-21

## 2019-11-21 LAB
HIV AG/AB: NORMAL
HIV ANTIGEN: NORMAL
HIV-1 ANTIBODY: NORMAL
HIV-2 AB: NORMAL

## 2019-11-26 ENCOUNTER — TELEPHONE (OUTPATIENT)
Dept: INTERNAL MEDICINE CLINIC | Age: 29
End: 2019-11-26

## 2019-11-27 ENCOUNTER — TELEPHONE (OUTPATIENT)
Dept: INTERNAL MEDICINE CLINIC | Age: 29
End: 2019-11-27

## 2019-12-06 ENCOUNTER — TELEPHONE (OUTPATIENT)
Dept: INTERNAL MEDICINE CLINIC | Age: 29
End: 2019-12-06

## 2019-12-06 ENCOUNTER — PATIENT MESSAGE (OUTPATIENT)
Dept: INTERNAL MEDICINE CLINIC | Age: 29
End: 2019-12-06

## 2019-12-12 ENCOUNTER — TELEPHONE (OUTPATIENT)
Dept: INTERNAL MEDICINE CLINIC | Age: 29
End: 2019-12-12

## 2020-02-23 ENCOUNTER — HOSPITAL ENCOUNTER (OUTPATIENT)
Dept: SLEEP CENTER | Age: 30
Discharge: HOME | End: 2020-02-23
Payer: COMMERCIAL

## 2020-02-23 PROCEDURE — 95810 POLYSOM 6/> YRS 4/> PARAM: CPT

## 2020-02-23 PROCEDURE — 95810 POLYSOM 6/> YRS 4/> PARAM: CPT | Performed by: INTERNAL MEDICINE

## 2020-02-26 ENCOUNTER — OFFICE VISIT (OUTPATIENT)
Dept: PULMONOLOGY | Age: 30
End: 2020-02-26
Payer: COMMERCIAL

## 2020-02-26 VITALS
HEART RATE: 73 BPM | SYSTOLIC BLOOD PRESSURE: 132 MMHG | WEIGHT: 217 LBS | DIASTOLIC BLOOD PRESSURE: 80 MMHG | HEIGHT: 68 IN | BODY MASS INDEX: 32.89 KG/M2 | OXYGEN SATURATION: 99 %

## 2020-02-26 PROBLEM — G47.33 OSA (OBSTRUCTIVE SLEEP APNEA): Chronic | Status: ACTIVE | Noted: 2019-03-14

## 2020-02-26 PROCEDURE — 99214 OFFICE O/P EST MOD 30 MIN: CPT | Performed by: INTERNAL MEDICINE

## 2020-02-26 ASSESSMENT — SLEEP AND FATIGUE QUESTIONNAIRES
HOW LIKELY ARE YOU TO NOD OFF OR FALL ASLEEP WHILE SITTING AND READING: 1
HOW LIKELY ARE YOU TO NOD OFF OR FALL ASLEEP WHILE LYING DOWN TO REST IN THE AFTERNOON WHEN CIRCUMSTANCES PERMIT: 1
HOW LIKELY ARE YOU TO NOD OFF OR FALL ASLEEP WHEN YOU ARE A PASSENGER IN A CAR FOR AN HOUR WITHOUT A BREAK: 1
HOW LIKELY ARE YOU TO NOD OFF OR FALL ASLEEP WHILE WATCHING TV: 1
HOW LIKELY ARE YOU TO NOD OFF OR FALL ASLEEP IN A CAR, WHILE STOPPED FOR A FEW MINUTES IN TRAFFIC: 0
HOW LIKELY ARE YOU TO NOD OFF OR FALL ASLEEP WHILE SITTING AND TALKING TO SOMEONE: 0
HOW LIKELY ARE YOU TO NOD OFF OR FALL ASLEEP WHILE SITTING INACTIVE IN A PUBLIC PLACE: 0
HOW LIKELY ARE YOU TO NOD OFF OR FALL ASLEEP WHILE SITTING QUIETLY AFTER LUNCH WITHOUT ALCOHOL: 0
ESS TOTAL SCORE: 4

## 2020-02-26 ASSESSMENT — ENCOUNTER SYMPTOMS
CHEST TIGHTNESS: 0
SHORTNESS OF BREATH: 0
STRIDOR: 0
SINUS PRESSURE: 0
PHOTOPHOBIA: 0
EYE PAIN: 0

## 2020-02-26 NOTE — LETTER
[facility] Sleep Medicine  [place] [address] [address] [address]  Phone: [phone]  Fax: [phone]    February 26, 2020       Patient: Ann Marie Shen   MR Number: [id number]   YOB: 1990   Date of Visit: 2/26/2020       Manas Sanderson was seen for a follow up visit today. Here is my assessment and plan as well as an attached copy of his visit today:    JULIAN (obstructive sleep apnea)  Chronic- Stable. Reviewed compliance download with pt. Supplies and parts as needed for his machine. These are medically necessary. Continue medications per his PCP and other physicians. Limit caffeine use after 3pm. Patient is meeting FAA criteria for obstructive sleep apnea treatment. 6 month FAA compliance checks. GERD without esophagitis  Chronic- Stable. Cont meds per PCP and other physicians. Chronic allergic rhinitis  Chronic- Stable. Cont meds per PCP and other physicians. Non morbid obesity, unspecified obesity type  Chronic-Stable. Encouraged him to work on weight loss through diet and exercise. If you have questions or concerns, please do not hesitate to call me. I look forward to following Maria Esther Lam along with you. Sincerely,    Skinny Cohen MD    CC providers:  Ngozi Cazares MD  [address] [facility] [address] Rosenda Rhodes

## 2020-02-26 NOTE — ASSESSMENT & PLAN NOTE
Chronic- Stable. Reviewed compliance download with pt. Supplies and parts as needed for his machine. These are medically necessary. Continue medications per his PCP and other physicians. Limit caffeine use after 3pm. Patient is meeting FAA criteria for obstructive sleep apnea treatment. 6 month FAA compliance checks.

## 2020-02-26 NOTE — PROGRESS NOTES
Nargis Matthews MD, FAASM, [facility]  Palak Diamond [address]  3rd Floor, [facility], [facility] [address] [phone]   [facility] [address]. [address] [place] [address]. [address] [phone]     [place] [address]  Dept: [phone]  Loc: [phone]    Assessment/Plan:     JULIAN (obstructive sleep apnea)  Chronic- Stable. Reviewed compliance download with pt. Supplies and parts as needed for his machine. These are medically necessary. Continue medications per his PCP and other physicians. Limit caffeine use after 3pm. Patient is meeting FAA criteria for obstructive sleep apnea treatment. 6 month FAA compliance checks. GERD without esophagitis  Chronic- Stable. Cont meds per PCP and other physicians. Chronic allergic rhinitis  Chronic- Stable. Cont meds per PCP and other physicians. Non morbid obesity, unspecified obesity type  Chronic-Stable. Encouraged him to work on weight loss through diet and exercise. Diagnoses of JULIAN (obstructive sleep apnea), GERD without esophagitis, Chronic allergic rhinitis, and Non morbid obesity, unspecified obesity type were pertinent to this visit. The chronic medical conditions listed are directly related to the primary diagnosis listed above. The management of the primary diagnosis affects the secondary diagnosis and vice versa. Subjective:     Patient ID: Elier Francisco is a 35 y.o. male. Chief Complaint   Patient presents with    Sleep Apnea     Subjective   HPI:    Machine Modem/Download Info:  Compliance (hours/night): 3 hrs/night  Download AHI (/hour): 1.8 /HR  Average CPAP Pressure : 8.8 cmH2O APAP - Settings  Pressure Min: 7 cmH2O  Pressure Max: 17 cmH2O     Comfort Settings  Humidity Level (0-8): 5       JULIAN: He continues to do well with his machine at the current settings. No issues with EDS, snoring, or apneas.   He is waking refreshed, for the most part, in the am.  No HA, dryness, or congestion. No issues using his machine. Not drowsy while driving or flying. Gastroesophageal reflux disease, allergic rhinitis, and obesity: stable on meds and followed by pt's PCP and other physicians. [facility]    [place] - Total score: 4    Social History     Socioeconomic History    Marital status: Single     Spouse name: Not on file    Number of children: Not on file    Years of education: Not on file    Highest education level: Not on file   Occupational History    Not on file   Social Needs    Financial resource strain: Not on file    Food insecurity:     Worry: Not on file     Inability: Not on file    Transportation needs:     Medical: Not on file     Non-medical: Not on file   Tobacco Use    Smoking status: Never Smoker    Smokeless tobacco: Never Used   Substance and Sexual Activity    Alcohol use:  Yes     Alcohol/week: 2.0 standard drinks     Types: 2 Cans of beer per week     Comment: occassionally    Drug use: Not on file    Sexual activity: Never   Lifestyle    Physical activity:     Days per week: Not on file     Minutes per session: Not on file    Stress: Not on file   Relationships    Social connections:     Talks on phone: Not on file     Gets together: Not on file     Attends Sikh service: Not on file     Active member of club or organization: Not on file     Attends meetings of clubs or organizations: Not on file     Relationship status: Not on file    Intimate partner violence:     Fear of current or ex partner: Not on file     Emotionally abused: Not on file     Physically abused: Not on file     Forced sexual activity: Not on file   Other Topics Concern    Not on file   Social History Narrative    Not on file       Current Outpatient Medications   Medication Sig Dispense Refill    naproxen sodium (ALEVE) 220 MG tablet Take 220 mg by mouth 2 times daily as needed       ranitidine (ZANTAC)

## 2020-07-20 ENCOUNTER — OFFICE VISIT (OUTPATIENT)
Dept: PRIMARY CARE CLINIC | Age: 30
End: 2020-07-20
Payer: COMMERCIAL

## 2020-07-20 PROCEDURE — 99211 OFF/OP EST MAY X REQ PHY/QHP: CPT | Performed by: NURSE PRACTITIONER

## 2020-07-20 NOTE — PROGRESS NOTES
Jorge Alberto Whitley received a viral test for COVID-19. They were educated on isolation and quarantine as appropriate. For any symptoms, they were directed to seek care from their PCP, given contact information to establish with a doctor, directed to an urgent care or the emergency room.

## 2020-07-25 LAB
SARS-COV-2: NOT DETECTED
SOURCE: NORMAL

## 2020-08-26 ENCOUNTER — VIRTUAL VISIT (OUTPATIENT)
Dept: PULMONOLOGY | Age: 30
End: 2020-08-26
Payer: COMMERCIAL

## 2020-08-26 PROCEDURE — 99214 OFFICE O/P EST MOD 30 MIN: CPT | Performed by: INTERNAL MEDICINE

## 2020-08-26 RX ORDER — FAMOTIDINE 20 MG/1
20 TABLET, FILM COATED ORAL 2 TIMES DAILY
COMMUNITY

## 2020-08-26 ASSESSMENT — SLEEP AND FATIGUE QUESTIONNAIRES
ESS TOTAL SCORE: 4
HOW LIKELY ARE YOU TO NOD OFF OR FALL ASLEEP WHILE SITTING AND TALKING TO SOMEONE: 0
HOW LIKELY ARE YOU TO NOD OFF OR FALL ASLEEP WHILE WATCHING TV: 1
HOW LIKELY ARE YOU TO NOD OFF OR FALL ASLEEP WHEN YOU ARE A PASSENGER IN A CAR FOR AN HOUR WITHOUT A BREAK: 0
HOW LIKELY ARE YOU TO NOD OFF OR FALL ASLEEP WHILE LYING DOWN TO REST IN THE AFTERNOON WHEN CIRCUMSTANCES PERMIT: 1
HOW LIKELY ARE YOU TO NOD OFF OR FALL ASLEEP WHILE SITTING QUIETLY AFTER LUNCH WITHOUT ALCOHOL: 1
HOW LIKELY ARE YOU TO NOD OFF OR FALL ASLEEP WHILE SITTING AND READING: 1
HOW LIKELY ARE YOU TO NOD OFF OR FALL ASLEEP IN A CAR, WHILE STOPPED FOR A FEW MINUTES IN TRAFFIC: 0
HOW LIKELY ARE YOU TO NOD OFF OR FALL ASLEEP WHILE SITTING INACTIVE IN A PUBLIC PLACE: 0

## 2020-08-26 NOTE — ASSESSMENT & PLAN NOTE
Chronic- Stable. Reviewed compliance download with pt. Supplies and parts as needed for his machine. These are medically necessary. Continue medications per his PCP and other physicians. Limit caffeine use after 3pm. Patient is meeting FAA criteria for obstructive sleep apnea treatment. No restrictions from sleep med standpoint for flying with continued compliant use of his machine. 6 month FAA compliance checks.

## 2020-08-26 NOTE — PROGRESS NOTES
Fabian Torres MD, [facility], [facility]  Tiffanie Kehrt [address]  3rd Floor, [address] [facility], [address] [phone]   [facility] [address]. [address] [facility]. [address] [phone]     Video Visit- Follow up    Pursuant to the emergency declaration under the [address] [place] [address], [facility] waiver authority and the [employer] Act, this Virtual  Visit was conducted, with patient's consent, to reduce the patient's risk of exposure to COVID-19 and provide continuity of care for an established patient. Services were provided through a video synchronous discussion virtually to substitute for in-person clinic visit. Patient was located in their home. Assessment/Plan:     JULIAN (obstructive sleep apnea)  Chronic- Stable. Reviewed compliance download with pt. Supplies and parts as needed for his machine. These are medically necessary. Continue medications per his PCP and other physicians. Limit caffeine use after 3pm. Patient is meeting FAA criteria for obstructive sleep apnea treatment. No restrictions from sleep med standpoint for flying with continued compliant use of his machine. 6 month FAA compliance checks. GERD without esophagitis  Chronic- Stable. Cont meds per PCP and other physicians. Chronic allergic rhinitis  Chronic- Stable. Cont meds per PCP and other physicians. Non morbid obesity, unspecified obesity type  Chronic- Stable. Cont meds per PCP and other physicians. Diagnoses of JULIAN (obstructive sleep apnea), GERD without esophagitis, Chronic allergic rhinitis, and Non morbid obesity, unspecified obesity type were pertinent to this visit. The chronic medical conditions listed are directly related to the primary diagnosis listed above. The management of the primary diagnosis affects the secondary diagnosis and vice versa.     Subjective:     Patient ID: Avni Scott is a 35 y.o. male. Chief Complaint   Patient presents with    Sleep Apnea     Subjective   HPI:    Machine Modem/Download Info:  Compliance (hours/night): 6.25 hrs/night  Download AHI (/hour): 2.3 /HR  Average CPAP Pressure : 8.2 cmH2O APAP - Settings  Pressure Min: 7 cmH2O  Pressure Max: 17 cmH2O     Comfort Settings  Humidity Level (0-8): 5  Flex/EPR (0-3): 3       JULIAN: He continues to do well with his machine at the current settings. No issues with EDS, snoring, or apneas. He is waking refreshed, for the most part, in the am.  No HA, dryness, or congestion. No issues using his machine. [facility]    [place] - Total score: 4    Current Outpatient Medications   Medication Sig Dispense Refill    Fexofenadine HCl (ALLEGRA PO) Take by mouth      famotidine (PEPCID) 20 MG tablet Take 20 mg by mouth 2 times daily      naproxen sodium (ALEVE) 220 MG tablet Take 220 mg by mouth 2 times daily as needed       ranitidine (ZANTAC) 150 MG tablet Take 150 mg by mouth 2 times daily      cetirizine (ZYRTEC) 10 MG tablet Take 10 mg by mouth daily. No current facility-administered medications for this visit.         Allergies as of 08/26/2020    (No Known Allergies)         Electronically signed by Stephanie Herbert MD on 8/26/2020 at 3:17 PM

## 2020-09-02 ENCOUNTER — OFFICE VISIT (OUTPATIENT)
Dept: PRIMARY CARE CLINIC | Age: 30
End: 2020-09-02
Payer: COMMERCIAL

## 2020-09-02 PROCEDURE — 99211 OFF/OP EST MAY X REQ PHY/QHP: CPT | Performed by: NURSE PRACTITIONER

## 2020-09-02 NOTE — PROGRESS NOTES
Eliu Mata received a viral test for COVID-19. They were educated on isolation and quarantine as appropriate. For any symptoms, they were directed to seek care from their PCP, given contact information to establish with a doctor, directed to an urgent care or the emergency room.

## 2020-09-04 LAB — SARS-COV-2, NAA: NOT DETECTED

## 2020-09-28 ENCOUNTER — NURSE TRIAGE (OUTPATIENT)
Dept: OTHER | Facility: CLINIC | Age: 30
End: 2020-09-28

## 2020-09-29 ENCOUNTER — OFFICE VISIT (OUTPATIENT)
Dept: PRIMARY CARE CLINIC | Age: 30
End: 2020-09-29
Payer: COMMERCIAL

## 2020-09-29 PROCEDURE — 99211 OFF/OP EST MAY X REQ PHY/QHP: CPT | Performed by: NURSE PRACTITIONER

## 2020-09-30 LAB — SARS-COV-2, NAA: NOT DETECTED

## 2020-10-20 ENCOUNTER — OFFICE VISIT (OUTPATIENT)
Dept: PRIMARY CARE CLINIC | Age: 30
End: 2020-10-20
Payer: COMMERCIAL

## 2020-10-20 PROCEDURE — 99211 OFF/OP EST MAY X REQ PHY/QHP: CPT | Performed by: NURSE PRACTITIONER

## 2020-10-20 NOTE — PROGRESS NOTES
Kate Kenney received a viral test for COVID-19. They were educated on isolation and quarantine as appropriate. For any symptoms, they were directed to seek care from their PCP, given contact information to establish with a doctor, directed to an urgent care or the emergency room.

## 2020-10-21 LAB — SARS-COV-2, NAA: NOT DETECTED

## 2020-10-22 NOTE — RESULT ENCOUNTER NOTE

## 2020-11-02 ENCOUNTER — OFFICE VISIT (OUTPATIENT)
Dept: FAMILY MEDICINE CLINIC | Age: 30
End: 2020-11-02
Payer: COMMERCIAL

## 2020-11-02 VITALS
WEIGHT: 218 LBS | SYSTOLIC BLOOD PRESSURE: 130 MMHG | DIASTOLIC BLOOD PRESSURE: 80 MMHG | HEART RATE: 80 BPM | HEIGHT: 68 IN | OXYGEN SATURATION: 98 % | BODY MASS INDEX: 33.04 KG/M2

## 2020-11-02 PROCEDURE — 99395 PREV VISIT EST AGE 18-39: CPT | Performed by: INTERNAL MEDICINE

## 2020-11-02 ASSESSMENT — ENCOUNTER SYMPTOMS
DIARRHEA: 0
SHORTNESS OF BREATH: 0
ABDOMINAL PAIN: 0
CHEST TIGHTNESS: 0
CONSTIPATION: 0
BLOOD IN STOOL: 0
VOMITING: 0
NAUSEA: 0

## 2020-11-02 ASSESSMENT — PATIENT HEALTH QUESTIONNAIRE - PHQ9
1. LITTLE INTEREST OR PLEASURE IN DOING THINGS: 0
SUM OF ALL RESPONSES TO PHQ9 QUESTIONS 1 & 2: 0
SUM OF ALL RESPONSES TO PHQ QUESTIONS 1-9: 0
2. FEELING DOWN, DEPRESSED OR HOPELESS: 0

## 2020-11-02 NOTE — PATIENT INSTRUCTIONS
Please call the office (and ask to see me) or be seen by other provider for any worsening or lack of improvement of the symptoms or for any new symptoms as soon as possible. Please make sure to schedule your follow appointment as discussed. Please let me know if ay further questions. Please check with your gastroenterologist if you need to be seen again for your reflux (GERD) symptoms.

## 2020-11-02 NOTE — PROGRESS NOTES
Outpatient Note - Annual physical     Patient:  Fernando Ludwig                                               : 1990  Age: 35 y.o. MRN: [id number]  Date : 2020      HistoryObtained From:  patient, electronic medical record    CHIEF COMPLAINT:    Chief Complaint   Patient presents with    Annual Exam       HISTORY OF PRESENT ILLNESS:   The patient is a 35 y.o. male who presents to be established and for his annual physical exam.     Annette Medina was seen today for annual exam.    Diagnoses and all orders for this visit:    Medicare annual wellness visit, subsequent  -     CBC Auto Differential; Future  -     Comprehensive Metabolic Panel; Future  -     Lipid Panel; Future    GERD without esophagitis    JULIAN (obstructive sleep apnea)    Encounter for HCV screening test for low risk patient  -     Hepatitis C Antibody; Future    pt Pt denies CP/SOB/palpitations/abdominal pain/N/V. No fever/ chills/ cough   No bleeding  No anxiety/ depression   Vision/ hearing ok   He had eye exam 2 weeks ago- prescription mild change   Duet/ activity- still active, diet could be better  He still has mild GERD Sx   He still takes Famotidine   He has seen GI in the past and had EGD - neg per pt   He has not come back yet   He still has neck pain  imrpoved  He had MRI in the past  He was followed though [facility] neurology (last visit 3/2020)  He had PT this year. Past Medical History:        Diagnosis Date    Chronic allergic rhinitis 2018    GERD without esophagitis 2018       Past Surgical History:        Procedure Laterality Date    ARM SURGERY  2005    ELBOW SURGERY Right     KNEE SURGERY      TONSILLECTOMY      UPPER GASTROINTESTINAL ENDOSCOPY  2016    bx       Family History:       Problem Relation Age of Onset    Heart Disease Father     High Blood Pressure Father     Glaucoma Sister        Social History:   TOBACCO:   reports that he has never smoked.  He has never used smokeless tobacco.  Alcohol- a few beers a week   No drugs     ETOH:   reports current alcohol use of about 2.0 standard drinks of alcohol per week. Allergies:  Patient has no known allergies. Current Medications:    Prior to Admission medications    Medication Sig Start Date End Date Taking? Authorizing Provider   Fexofenadine HCl (ALLEGRA PO) Take by mouth   Yes Historical Provider, MD   famotidine (PEPCID) 20 MG tablet Take 20 mg by mouth 2 times daily   Yes Historical Provider, MD       REVIEW OFSYSTEMS:  Review of Systems   Constitutional: Negative for activity change, appetite change, chills, fever and unexpected weight change. HENT: Negative for hearing loss. Eyes: Negative for visual disturbance. Respiratory: Negative for chest tightness and shortness of breath. Cardiovascular: Negative for chest pain. Gastrointestinal: Negative for abdominal pain, blood in stool, constipation, diarrhea, nausea and vomiting. Genitourinary: Negative for difficulty urinating and hematuria. Skin: Negative for rash. Allergic/Immunologic: Negative for immunocompromised state. Neurological: Negative for weakness and headaches. Psychiatric/Behavioral: Negative for dysphoric mood and suicidal ideas. The patient is not nervous/anxious. Screening:    Colon cancer screening:none in family except for grandfather when he was 80    Prostate cancer screening: none in family     Need screening for lung cancer? No    AAA screening needed:  no     Pt encouraged to perform testicular self exams for lumps (cancer prevention) Yes    Need screening for HIV ? No: he had it in the past   He uses condoms.      Immunization:    Immunization History   Administered Date(s) Administered    Influenza Virus Vaccine 09/06/2018, 09/26/2019    Influenza, Intradermal, Quadrivalent, Preservative Free 10/30/2017    Influenza, Quadv, IM, PF (6 mo and older Fluzone, Flulaval, Fluarix, and 3 yrs and older Afluria) 09/06/2018, 09/26/2019  Tdap (Boostrix, Adacel) 11/20/2019       Physical Exam:      Vitals: /80 (Site: Left Upper Arm, Position: Sitting, Cuff Size: Medium Adult)   Pulse 80   Ht 5' 8\" (1.727 m)   Wt 218 lb (98.9 kg)   SpO2 98%   BMI 33.15 kg/m²     Body mass index is 33.15 kg/m². Wt Readings from Last 3 Encounters:   11/02/20 218 lb (98.9 kg)   02/26/20 217 lb (98.4 kg)   11/20/19 214 lb (97.1 kg)     Physical Exam  Constitutional:       General: He is not in acute distress. Appearance: He is well-developed. He is not diaphoretic. HENT:      Head: Normocephalic and atraumatic. Right Ear: Tympanic membrane, ear canal and external ear normal.      Left Ear: Tympanic membrane, ear canal and external ear normal.      Mouth/Throat:      Pharynx: No oropharyngeal exudate. Eyes:      General: No scleral icterus. Right eye: No discharge. Left eye: No discharge. Extraocular Movements: Extraocular movements intact. Conjunctiva/sclera: Conjunctivae normal.   Neck:      Musculoskeletal: Normal range of motion and neck supple. Cardiovascular:      Rate and Rhythm: Normal rate. Pulses: Normal pulses. Heart sounds: Normal heart sounds. No murmur. Pulmonary:      Effort: Pulmonary effort is normal. No respiratory distress. Breath sounds: Normal breath sounds. No wheezing or rales. Abdominal:      General: There is no distension. Palpations: Abdomen is soft. Tenderness: There is no abdominal tenderness. There is no guarding. Musculoskeletal:         General: No deformity. Right lower leg: No edema. Left lower leg: No edema. Lymphadenopathy:      Head:      Right side of head: No submental or submandibular adenopathy. Left side of head: No submental or submandibular adenopathy. Cervical: No cervical adenopathy. Right cervical: No superficial or deep cervical adenopathy. Left cervical: No superficial or deep cervical adenopathy.    Skin: Findings: No rash. Neurological:      Mental Status: He is alert and oriented to person, place, and time. GCS: GCS eye subscore is 4. GCS verbal subscore is 5. GCS motor subscore is 6. Motor: No abnormal muscle tone. Deep Tendon Reflexes: Reflexes are normal and symmetric. Psychiatric:         Behavior: Behavior normal.         Thought Content: Thought content normal.        Assessment/Plan:   Shady Chaudhry was seen today for annual exam.    Diagnoses and all orders for this visit:    Medicare annual wellness visit, subsequent  doign ok   Good life style  Normal physical exam.  I suggested calling his GI just to check if they would like to follow-up with him for his reflux symptoms. Neck pain is better and is followed by [facility] no further work-up  -     CBC Auto Differential; Future  -     Comprehensive Metabolic Panel; Future  -     Lipid Panel; Future    GERD without esophagitis    JULIAN (obstructive sleep apnea)    Encounter for HCV screening test for low risk patient  -     Hepatitis C Antibody; Future    - Patient was encouraged to call the office (and ask to see me) or be seen by other provider for any worsening or lack of improvement of the symptoms or any new symptoms.    - Pt was asked to schedule an appointment in 12 months, and to let me know of any scheduling difficulties. Additional patientsinstructions (if given):   Patient Instructions   Please call the office (and ask to see me) or be seen by other provider for any worsening or lack of improvement of the symptoms or for any new symptoms as soon as possible. Please make sure to schedule your follow appointment as discussed. Please let me know if ay further questions. Please check with your gastroenterologist if you need to be seen again for your reflux (GERD) symptoms. NOTE: This report was transcribed using voice recognition software.  Every effort was made to ensure accuracy, however, inadvertent computerized transcription errors may be present.

## 2020-11-18 DIAGNOSIS — Z11.59 ENCOUNTER FOR HCV SCREENING TEST FOR LOW RISK PATIENT: ICD-10-CM

## 2020-11-18 DIAGNOSIS — Z00.00 MEDICARE ANNUAL WELLNESS VISIT, SUBSEQUENT: ICD-10-CM

## 2020-11-18 LAB
A/G RATIO: 1.3 (ref 1.1–2.2)
ALBUMIN SERPL-MCNC: 4.2 G/DL (ref 3.4–5)
ALP BLD-CCNC: 44 U/L (ref 40–129)
ALT SERPL-CCNC: 44 U/L (ref 10–40)
ANION GAP SERPL CALCULATED.3IONS-SCNC: 12 MMOL/L (ref 3–16)
AST SERPL-CCNC: 34 U/L (ref 15–37)
ATYPICAL LYMPHOCYTE RELATIVE PERCENT: 8 % (ref 0–6)
BANDED NEUTROPHILS RELATIVE PERCENT: 5 % (ref 0–7)
BASOPHILS ABSOLUTE: 0 K/UL (ref 0–0.2)
BASOPHILS RELATIVE PERCENT: 0 %
BILIRUB SERPL-MCNC: 0.4 MG/DL (ref 0–1)
BUN BLDV-MCNC: 12 MG/DL (ref 7–20)
CALCIUM SERPL-MCNC: 9.3 MG/DL (ref 8.3–10.6)
CHLORIDE BLD-SCNC: 106 MMOL/L (ref 99–110)
CHOLESTEROL, TOTAL: 135 MG/DL (ref 0–199)
CO2: 25 MMOL/L (ref 21–32)
CREAT SERPL-MCNC: 0.9 MG/DL (ref 0.9–1.3)
EOSINOPHILS ABSOLUTE: 0 K/UL (ref 0–0.6)
EOSINOPHILS RELATIVE PERCENT: 0 %
GFR AFRICAN AMERICAN: >60
GFR NON-AFRICAN AMERICAN: >60
GLOBULIN: 3.2 G/DL
GLUCOSE BLD-MCNC: 94 MG/DL (ref 70–99)
HCT VFR BLD CALC: 42.9 % (ref 40.5–52.5)
HDLC SERPL-MCNC: 24 MG/DL (ref 40–60)
HEMOGLOBIN: 14.1 G/DL (ref 13.5–17.5)
HEPATITIS C ANTIBODY INTERPRETATION: NORMAL
LDL CHOLESTEROL CALCULATED: 54 MG/DL
LYMPHOCYTES ABSOLUTE: 4 K/UL (ref 1–5.1)
LYMPHOCYTES RELATIVE PERCENT: 39 %
MCH RBC QN AUTO: 28.3 PG (ref 26–34)
MCHC RBC AUTO-ENTMCNC: 32.9 G/DL (ref 31–36)
MCV RBC AUTO: 86.2 FL (ref 80–100)
METAMYELOCYTES RELATIVE PERCENT: 2 %
MONOCYTES ABSOLUTE: 0.7 K/UL (ref 0–1.3)
MONOCYTES RELATIVE PERCENT: 8 %
NEUTROPHILS ABSOLUTE: 3.9 K/UL (ref 1.7–7.7)
NEUTROPHILS RELATIVE PERCENT: 38 %
PDW BLD-RTO: 13.8 % (ref 12.4–15.4)
PLATELET # BLD: 233 K/UL (ref 135–450)
PMV BLD AUTO: 7.1 FL (ref 5–10.5)
POTASSIUM SERPL-SCNC: 4.1 MMOL/L (ref 3.5–5.1)
RBC # BLD: 4.98 M/UL (ref 4.2–5.9)
RBC # BLD: NORMAL 10*6/UL
SODIUM BLD-SCNC: 143 MMOL/L (ref 136–145)
TOTAL PROTEIN: 7.4 G/DL (ref 6.4–8.2)
TRIGL SERPL-MCNC: 287 MG/DL (ref 0–150)
VLDLC SERPL CALC-MCNC: 57 MG/DL
WBC # BLD: 8.6 K/UL (ref 4–11)

## 2020-11-25 NOTE — PROGRESS NOTES
Nisha Thapa         : 1990    Diagnosis: [x] JULIAN (G47.33) [] CSA (G47.31) [] Apnea (G47.30)   Length of Need: [x] 8 Months [] 99 Months [] Other:    Machine (ZENAIDA!): [] Respironics Dream Station      Auto [] ResMed AirSense     Auto [] Other:     [x] TRAVEL CPAP () [] Bilevel ()   Mode: [x] Auto [] Spontaneous    Mode: [] Auto [] Spontaneous        Pressure Min: 7 cmH2O   Pressure Max: 17 cmH2O                   Comfort Settings:   - Ramp Pressure: 5 cmH2O                                        - Ramp time: 15 min                                     -  Flex/EPR - 3 full time                                         Humidifier: [] Heated ()        [] Water chamber replacement ()/ 1 per 6 months        Mask:   [] Nasal () /1 per 3 months [] Full Face () /1 per 3 months   [] Patient choice -Size and fit mask [] Patient Choice - Size and fit mask   [] Dispense:  [] Dispense:    [] Headgear () / 1 per 3 months [] Headgear () / 1 per 3 months   [] Replacement Nasal Cushion ()/2 per month [] Interface Replacement ()/1 per month   [] Replacement Nasal Pillows ()/2 per month         Tubing: [] Heated ()/1 per 3 months    [] Standard ()/1 per 3 months [] Other:           Filters: [] Non-disposable ()/1 per 6 months     [] Ultra-Fine, Disposable ()/2 per month        Miscellaneous: [] Chin Strap ()/ 1 per 6 months [] O2 bleed-in:       LPM   [] Oximetry on CPAP/Bilevel []  Other:    [] Modem: ()         Start Order Date: 20    MEDICAL JUSTIFICATION:  I, the undersigned, certify that the above prescribed supplies are medically necessary for this patients wellbeing. In my opinion, the supplies are both reasonable and necessary in reference to accepted standards of medicalpractice in treatment of this patients condition.     Shannan Meltonr, MARILEE - CNP      NPI: [id number]       Order Signed Date: 20    Electronically signed by MARILEE Savage - CNP on 11/25/2020 at 11:07 AM

## 2020-12-07 ENCOUNTER — OFFICE VISIT (OUTPATIENT)
Dept: PRIMARY CARE CLINIC | Age: 30
End: 2020-12-07
Payer: COMMERCIAL

## 2020-12-07 PROCEDURE — 99211 OFF/OP EST MAY X REQ PHY/QHP: CPT | Performed by: NURSE PRACTITIONER

## 2020-12-07 NOTE — PROGRESS NOTES
Coby Whipple received a viral test for COVID-19. They were educated on isolation and quarantine as appropriate. For any symptoms, they were directed to seek care from their PCP, given contact information to establish with a doctor, directed to an urgent care or the emergency room.

## 2020-12-08 LAB — SARS-COV-2, NAA: NOT DETECTED

## 2020-12-09 ENCOUNTER — TELEPHONE (OUTPATIENT)
Dept: PULMONOLOGY | Age: 30
End: 2020-12-09

## 2020-12-09 NOTE — TELEPHONE ENCOUNTER
Patient left a message asking what he was supposed to do with what he received in the mail from us. I called and spoke with patient and told him that was an order for a travel CPAP and he could take that to any company he wanted to buy it from. I told him to call us back if he needed anything else.

## 2020-12-14 DIAGNOSIS — D72.820 ATYPICAL LYMPHOCYTOSIS: ICD-10-CM

## 2020-12-15 LAB
BLOOD SMEAR REVIEW: NORMAL
HEMATOLOGY PATH CONSULT: NORMAL

## 2020-12-28 ENCOUNTER — OFFICE VISIT (OUTPATIENT)
Dept: PRIMARY CARE CLINIC | Age: 30
End: 2020-12-28
Payer: COMMERCIAL

## 2020-12-28 PROCEDURE — 99211 OFF/OP EST MAY X REQ PHY/QHP: CPT | Performed by: NURSE PRACTITIONER

## 2020-12-28 NOTE — PROGRESS NOTES
Katharina Alfredo received a viral test for COVID-19. They were educated on isolation and quarantine as appropriate. For any symptoms, they were directed to seek care from their PCP, given contact information to establish with a doctor, directed to an urgent care or the emergency room.

## 2020-12-29 ENCOUNTER — TELEPHONE (OUTPATIENT)
Dept: ADMINISTRATIVE | Age: 30
End: 2020-12-29

## 2020-12-29 LAB — SARS-COV-2, NAA: DETECTED

## 2021-03-03 ENCOUNTER — OFFICE VISIT (OUTPATIENT)
Dept: PULMONOLOGY | Age: 31
End: 2021-03-03
Payer: COMMERCIAL

## 2021-03-03 VITALS — HEIGHT: 68 IN | BODY MASS INDEX: 31.31 KG/M2 | WEIGHT: 206.6 LBS

## 2021-03-03 DIAGNOSIS — J30.9 CHRONIC ALLERGIC RHINITIS: Chronic | ICD-10-CM

## 2021-03-03 DIAGNOSIS — E66.9 NON MORBID OBESITY, UNSPECIFIED OBESITY TYPE: Chronic | ICD-10-CM

## 2021-03-03 DIAGNOSIS — G47.33 OSA (OBSTRUCTIVE SLEEP APNEA): Chronic | ICD-10-CM

## 2021-03-03 PROCEDURE — 99214 OFFICE O/P EST MOD 30 MIN: CPT | Performed by: INTERNAL MEDICINE

## 2021-03-03 RX ORDER — FLUTICASONE PROPIONATE 50 MCG
2 SPRAY, SUSPENSION (ML) NASAL EVERY EVENING
COMMUNITY

## 2021-03-03 ASSESSMENT — SLEEP AND FATIGUE QUESTIONNAIRES
HOW LIKELY ARE YOU TO NOD OFF OR FALL ASLEEP WHILE SITTING AND READING: 1
HOW LIKELY ARE YOU TO NOD OFF OR FALL ASLEEP WHEN YOU ARE A PASSENGER IN A CAR FOR AN HOUR WITHOUT A BREAK: 1
HOW LIKELY ARE YOU TO NOD OFF OR FALL ASLEEP WHILE WATCHING TV: 1
ESS TOTAL SCORE: 7
HOW LIKELY ARE YOU TO NOD OFF OR FALL ASLEEP WHILE SITTING INACTIVE IN A PUBLIC PLACE: 1

## 2021-03-03 NOTE — ASSESSMENT & PLAN NOTE
Chronic-not fully controlled: Reviewed and analyzed results of physiologic download from patient's machine and reviewed with patient. Supplies and parts as needed for his machine. These are medically necessary. Limit caffeine use after 3pm. Based on the analyzed data will continue with current settings but have patient work on managing congestion to increase compliance. FAA requires on average 6 hrs a night of use.

## 2021-03-03 NOTE — ASSESSMENT & PLAN NOTE
Chronic- not controlled. Cont allegra at current dose daily during allergy season.   Needs to use his nasal steroids consistently, 2 sprays in each nostril qHS

## 2021-03-03 NOTE — LETTER
[facility] Sleep Medicine  [place] [address] [address] [address]  Phone: [phone]  Fax: [phone]    March 3, 2021       Patient: Paz Harris   MR Number: [id number]   YOB: 1990   Date of Visit: 3/3/2021       Ben Jean was seen for a follow up visit today. Here is my assessment and plan as well as an attached copy of his visit today:    JULIAN (obstructive sleep apnea)  Chronic-not fully controlled: Reviewed and analyzed results of physiologic download from patient's machine and reviewed with patient. Supplies and parts as needed for his machine. These are medically necessary. Limit caffeine use after 3pm. Based on the analyzed data will continue with current settings but have patient work on managing congestion to increase compliance. FAA requires on average 6 hrs a night of use. Non morbid obesity, unspecified obesity type  Chronic-Stable. Encouraged him to work on weight loss through diet and exercise. Chronic allergic rhinitis  Chronic- not controlled. Cont allegra at current dose daily during allergy season. Needs to use his nasal steroids consistently, 2 sprays in each nostril qHS        If you have questions or concerns, please do not hesitate to call me. I look forward to following Annika Beach along with you.     Sincerely,    Mary Villeda MD    CC providers:  Emily Maxwell MD  [address] [facility]  [phone]  [employer] [employer]

## 2021-06-03 ENCOUNTER — TELEPHONE (OUTPATIENT)
Dept: FAMILY MEDICINE CLINIC | Age: 31
End: 2021-06-03

## 2021-06-03 NOTE — TELEPHONE ENCOUNTER
----- Message from Liliane Everett sent at 6/1/2021 12:51 PM EDT -----  Subject: Appointment Request    Reason for Call: Routine Physical Exam    QUESTIONS  Type of Appointment? Established Patient  Reason for appointment request? Other - Needs New Provider also  Additional Information for Provider? Patient called to schedule his annual   physical but also stated that he would need a new provider in the same   office due to his current provider moving away.   ---------------------------------------------------------------------------  --------------  [id number]  What is the best way for the office to contact you? OK to leave message on   voicemail  Preferred Call Back Phone Number? [phone]  ---------------------------------------------------------------------------  --------------  SCRIPT ANSWERS  Relationship to Patient? Self  Appointment reason? Well Care/Follow Ups  Select a Well Care/Follow Ups appointment reason? Adult Physical Exam   [Medicare Annual Wellness, AWV, PAP, Pelvic]  (If the patient has Medicare as their primary insurance coverage ask this   question) Are you requesting a Medicare Annual Wellness Visit? No  (Is the patient requesting a pap smear with their physical exam?)? No  (Is the patient requesting their annual physical and does not need PAP or   AWV per above?)? Yes   Have you been diagnosed with, awaiting test results for, or told that you   are suspected of having COVID-19 (Coronavirus)? (If patient has tested   negative or was tested as a requirement for work, school, or travel and   not based on symptoms, answer no)? No  Do you currently have flu-like symptoms including fever or chills, cough,   shortness of breath, difficulty breathing, or new loss of taste or smell? No  Have you had close contact with someone with COVID-19 in the last 14 days? No  (Service Expert  click yes below to proceed with [employer] As Usual   Scheduling)?  Yes

## 2021-06-09 ENCOUNTER — OFFICE VISIT (OUTPATIENT)
Dept: PULMONOLOGY | Age: 31
End: 2021-06-09
Payer: COMMERCIAL

## 2021-06-09 VITALS
SYSTOLIC BLOOD PRESSURE: 126 MMHG | HEART RATE: 56 BPM | WEIGHT: 210.4 LBS | HEIGHT: 68 IN | DIASTOLIC BLOOD PRESSURE: 80 MMHG | OXYGEN SATURATION: 98 % | BODY MASS INDEX: 31.89 KG/M2

## 2021-06-09 DIAGNOSIS — G47.33 OSA (OBSTRUCTIVE SLEEP APNEA): Chronic | ICD-10-CM

## 2021-06-09 DIAGNOSIS — J30.9 CHRONIC ALLERGIC RHINITIS: Chronic | ICD-10-CM

## 2021-06-09 DIAGNOSIS — E66.9 NON MORBID OBESITY, UNSPECIFIED OBESITY TYPE: Chronic | ICD-10-CM

## 2021-06-09 PROCEDURE — 99214 OFFICE O/P EST MOD 30 MIN: CPT | Performed by: INTERNAL MEDICINE

## 2021-06-09 ASSESSMENT — SLEEP AND FATIGUE QUESTIONNAIRES
HOW LIKELY ARE YOU TO NOD OFF OR FALL ASLEEP WHILE SITTING AND TALKING TO SOMEONE: 0
HOW LIKELY ARE YOU TO NOD OFF OR FALL ASLEEP WHILE LYING DOWN TO REST IN THE AFTERNOON WHEN CIRCUMSTANCES PERMIT: 1
HOW LIKELY ARE YOU TO NOD OFF OR FALL ASLEEP IN A CAR, WHILE STOPPED FOR A FEW MINUTES IN TRAFFIC: 0
HOW LIKELY ARE YOU TO NOD OFF OR FALL ASLEEP WHILE SITTING QUIETLY AFTER LUNCH WITHOUT ALCOHOL: 1
HOW LIKELY ARE YOU TO NOD OFF OR FALL ASLEEP WHILE SITTING AND READING: 1
HOW LIKELY ARE YOU TO NOD OFF OR FALL ASLEEP WHEN YOU ARE A PASSENGER IN A CAR FOR AN HOUR WITHOUT A BREAK: 1
ESS TOTAL SCORE: 5
HOW LIKELY ARE YOU TO NOD OFF OR FALL ASLEEP WHILE WATCHING TV: 1
HOW LIKELY ARE YOU TO NOD OFF OR FALL ASLEEP WHILE SITTING INACTIVE IN A PUBLIC PLACE: 0

## 2021-06-09 NOTE — ASSESSMENT & PLAN NOTE
Chronic- Stable. Discussed the importance of treating this effectively to help with treatment for  obstructive sleep apnea. Cont any meds per PCP and other physicians. Encourage patient to continue be consistent with his nasal steroid sprays and could consider sinus rinses if needed.

## 2021-06-09 NOTE — PROGRESS NOTES
Conner Mclaughlin MD, Kristine Palumbo, [facility]  Tiffanie Kehrt CNP  Sachin Davis CNP [address] [place] [address]  [address] [facility], [address] [facility]- [phone]   [facility] [address]. [address] [facility]. [address] [phone]     [address] [facility] [address] [phone]      Assessment/Plan:     1. JULIAN (obstructive sleep apnea)  Assessment & Plan:  Chronic-Stable: Reviewed and analyzed results of physiologic download from patient's machine and reviewed with patient. Supplies and parts as needed for his machine. These are medically necessary. Limit caffeine use after 3pm. Based on the analyzed data will do trial increase in Pmin-9 to see if it helps him keep his mask on longer. Again encourage patient to use his machine is much as possible and reminded him of the FAA requirements of having an average of 6 hours a compliance per night. 2. Chronic allergic rhinitis  Assessment & Plan:  Chronic- Stable. Discussed the importance of treating this effectively to help with treatment for  obstructive sleep apnea. Cont any meds per PCP and other physicians. Encourage patient to continue be consistent with his nasal steroid sprays and could consider sinus rinses if needed. 3. Non morbid obesity, unspecified obesity type  Assessment & Plan:  Chronic-not stable:  Discussed importance of treating obstructive sleep apnea and getting sufficient sleep to assist with weight control. Encouraged him to work on weight loss through diet and exercise. Recommended DASH or Mediterranean diets. Reviewed, analyzed, and documented physiologic data from patient's PAP machine. This information was analyzed to assess complexity and medical decision making in regards to further testing and management. Diagnoses of JULIAN (obstructive sleep apnea), Chronic allergic rhinitis, and Non morbid obesity, unspecified obesity type were pertinent to this visit. The chronic medical conditions listed are directly related to the primary diagnosis listed above. The management of the primary diagnosis affects the secondary diagnosis and vice versa. Subjective:   Subjective   Patient ID: Parker Lawler is a 28 y.o. male. Chief Complaint   Patient presents with    Sleep Apnea       HPI:  Machine Modem/Download Info:  Compliance (hours/night): 5.25 hrs/night  % of nights >= 4 hrs: 80 %  Download AHI (/hour): 2.2 /HR  Average CPAP Pressure : 8.5 cmH2O APAP - Settings  Pressure Min: 7 cmH2O  Pressure Max: 17 cmH2O                 Comfort Settings  Humidity Level (0-8): 4  Flex/EPR (0-3): 3       Feels his been trying to do better with his machine and the data reflects of the last 30 days his appliance has improved. Congestion is better with using nasal steroid sprays but not always fully consistent. Still wakes up the mask off his face a fourth of the nights not knowing that he took it off. [facility]    [place] - Total score: 5    Social History     Socioeconomic History    Marital status: Single     Spouse name: Not on file    Number of children: Not on file    Years of education: Not on file    Highest education level: Not on file   Occupational History    Not on file   Tobacco Use    Smoking status: Never Smoker    Smokeless tobacco: Never Used   Vaping Use    Vaping Use: Never used   Substance and Sexual Activity    Alcohol use:  Yes     Alcohol/week: 2.0 standard drinks     Types: 2 Cans of beer per week     Comment: occassionally    Drug use: Not on file    Sexual activity: Never   Other Topics Concern    Not on file   Social History Narrative    Not on file     Social Determinants of Health     Financial Resource Strain:     Difficulty of Paying Living Expenses:    Food Insecurity:     Worried About Running Out of Food in the Last Year:     [address] in the Last Year:    Transportation Needs:     Lack of Transportation (Medical):  Lack of Transportation (Non-Medical):    Physical Activity:     Days of Exercise per Week:     Minutes of Exercise per Session:    Stress:     Feeling of Stress :    Social Connections:     Frequency of Communication with Friends and Family:     Frequency of Social Gatherings with Friends and Family:     Attends Bahai Services:     Active Member of Clubs or Organizations:     Attends Club or Organization Meetings:     Marital Status:    Intimate Partner Violence:     Fear of Current or Ex-Partner:     Emotionally Abused:     Physically Abused:     Sexually Abused:        Current Outpatient Medications   Medication Instructions    famotidine (PEPCID) 20 mg, Oral, 2 TIMES DAILY    Fexofenadine HCl (ALLEGRA PO) Oral    fluticasone (FLONASE) 50 MCG/ACT nasal spray 2 sprays, Each Nostril, EVERY EVENING          Vitals:  Weight BMI   Wt Readings from Last 3 Encounters:   06/09/21 210 lb 6.4 oz (95.4 kg)   03/03/21 206 lb 9.6 oz (93.7 kg)   11/02/20 218 lb (98.9 kg)    Body mass index is 31.99 kg/m².      BP HR SaO2   BP Readings from Last 3 Encounters:   06/09/21 126/80   11/02/20 130/80   02/26/20 132/80    Pulse Readings from Last 3 Encounters:   06/09/21 56   11/02/20 80   02/26/20 73    SpO2 Readings from Last 3 Encounters:   06/09/21 98%   11/02/20 98%   02/26/20 99%        Electronically signed by Lottie Kauffman MD on 6/9/2021 at 9:33 AM

## 2021-06-18 ENCOUNTER — PATIENT MESSAGE (OUTPATIENT)
Dept: PULMONOLOGY | Age: 31
End: 2021-06-18

## 2021-06-25 ENCOUNTER — TELEPHONE (OUTPATIENT)
Dept: FAMILY MEDICINE CLINIC | Age: 31
End: 2021-06-25

## 2021-06-25 DIAGNOSIS — D72.89 ATYPICAL LYMPHOCYTES PRESENT ON PERIPHERAL BLOOD SMEAR: Primary | ICD-10-CM

## 2021-06-25 NOTE — TELEPHONE ENCOUNTER
Pt did not answer  I left VM to call office  I also asked his mother to call the office  She will remind him to schedule with new PCP   He needs to repeat CBC

## 2021-07-01 ENCOUNTER — OFFICE VISIT (OUTPATIENT)
Dept: INTERNAL MEDICINE CLINIC | Age: 31
End: 2021-07-01
Payer: COMMERCIAL

## 2021-07-01 VITALS
DIASTOLIC BLOOD PRESSURE: 60 MMHG | HEART RATE: 58 BPM | OXYGEN SATURATION: 98 % | BODY MASS INDEX: 31.81 KG/M2 | WEIGHT: 209.2 LBS | SYSTOLIC BLOOD PRESSURE: 110 MMHG

## 2021-07-01 DIAGNOSIS — D72.820 ATYPICAL LYMPHOCYTOSIS: ICD-10-CM

## 2021-07-01 DIAGNOSIS — Z00.00 ANNUAL PHYSICAL EXAM: ICD-10-CM

## 2021-07-01 DIAGNOSIS — E66.09 CLASS 1 OBESITY DUE TO EXCESS CALORIES WITHOUT SERIOUS COMORBIDITY WITH BODY MASS INDEX (BMI) OF 31.0 TO 31.9 IN ADULT: ICD-10-CM

## 2021-07-01 DIAGNOSIS — G47.33 OSA (OBSTRUCTIVE SLEEP APNEA): Primary | ICD-10-CM

## 2021-07-01 PROBLEM — E66.811 CLASS 1 OBESITY DUE TO EXCESS CALORIES WITHOUT SERIOUS COMORBIDITY WITH BODY MASS INDEX (BMI) OF 31.0 TO 31.9 IN ADULT: Status: ACTIVE | Noted: 2018-05-30

## 2021-07-01 PROCEDURE — 99385 PREV VISIT NEW AGE 18-39: CPT | Performed by: INTERNAL MEDICINE

## 2021-07-01 SDOH — ECONOMIC STABILITY: FOOD INSECURITY: WITHIN THE PAST 12 MONTHS, THE FOOD YOU BOUGHT JUST DIDN'T LAST AND YOU DIDN'T HAVE MONEY TO GET MORE.: NEVER TRUE

## 2021-07-01 SDOH — ECONOMIC STABILITY: FOOD INSECURITY: WITHIN THE PAST 12 MONTHS, YOU WORRIED THAT YOUR FOOD WOULD RUN OUT BEFORE YOU GOT MONEY TO BUY MORE.: NEVER TRUE

## 2021-07-01 ASSESSMENT — PATIENT HEALTH QUESTIONNAIRE - PHQ9
1. LITTLE INTEREST OR PLEASURE IN DOING THINGS: 0
SUM OF ALL RESPONSES TO PHQ QUESTIONS 1-9: 0
2. FEELING DOWN, DEPRESSED OR HOPELESS: 0
SUM OF ALL RESPONSES TO PHQ QUESTIONS 1-9: 0
SUM OF ALL RESPONSES TO PHQ9 QUESTIONS 1 & 2: 0
SUM OF ALL RESPONSES TO PHQ QUESTIONS 1-9: 0

## 2021-07-01 ASSESSMENT — SOCIAL DETERMINANTS OF HEALTH (SDOH): HOW HARD IS IT FOR YOU TO PAY FOR THE VERY BASICS LIKE FOOD, HOUSING, MEDICAL CARE, AND HEATING?: NOT HARD AT ALL

## 2021-07-01 NOTE — PROGRESS NOTES
[facility]  Internal Medicine  New Patient Note  Damon Saldana,       2021    Nisha Thapa (:  1990) is a 28 y.o. male, here for evaluation of the following medical concerns:      SUBJECTIVE:    HPI     Patient is a 81-year-old male with past medical history of GERD, JULIAN, and obesity who presents for follow-up for chronic disease as well as to establish care. GERD: Overall well controlled on Pepcid 20 mg daily. Occasionally he takes it twice a day. EGD     JULIAN: Patient's sees a sleep specialist.  He does wear CPAP at night. Overall no issues with this. Patient does mention that his dad passed away recently in 2020. He notes that there was question of possible Wegener's at that time. Patient had been on blood thinners for a valve replacement and then had a brain bleed. This was ultimately with the patient passed from. Patient is concerned that there could be something genetic. He has been trying to obtain his dad's medical records for his PCPs review. Patient does note some aches and pains secondary to sports injuries from the past.  He had a right elbow dislocation as well as a left ACL repair and a couple herniated disks in the cervical region. He notes overall these are stable. Patient notes that his neck will give him some discomfort on occasion. This discomfort typically comes with radicular pain down the left arm. This is not currently bothering him. He maintains it well with physical therapy good posture and intermittent over-the-counter ibuprofen or Tylenol. Patient does try to live a healthy lifestyle. He notes that he eats fruits and vegetables and a lot of lean proteins. He tries to workout at least 4 times per week at least 30 minutes each time. Patient educated on sunscreen use  Patient wear seatbelt regularly  Patient has fire alarm and carbon monoxide alarms within the home but these were recently replaced.   Patient has no firearms within the home. Patient has had recent dental and eye exams. Review of Systems ROS negative except for those noted in the HPI above. Outpatient Medications Marked as Taking for the 7/1/21 encounter (Office Visit) with Gerhardt Captain, DO   Medication Sig Dispense Refill    fluticasone (FLONASE) 50 MCG/ACT nasal spray 2 sprays by Each Nostril route every evening      Fexofenadine HCl (ALLEGRA PO) Take by mouth      famotidine (PEPCID) 20 MG tablet Take 20 mg by mouth 2 times daily          No Known Allergies    Past Medical History:   Diagnosis Date    Chronic allergic rhinitis 5/30/2018    GERD without esophagitis 5/30/2018       Past Surgical History:   Procedure Laterality Date    ARM SURGERY  2005    ELBOW SURGERY Right     KNEE SURGERY      TONSILLECTOMY      UPPER GASTROINTESTINAL ENDOSCOPY  11/17/2016    bx       Social History     Socioeconomic History    Marital status: Single     Spouse name: Not on file    Number of children: Not on file    Years of education: Not on file    Highest education level: Not on file   Occupational History    Not on file   Tobacco Use    Smoking status: Never Smoker    Smokeless tobacco: Never Used   Vaping Use    Vaping Use: Never used   Substance and Sexual Activity    Alcohol use: Yes     Alcohol/week: 2.0 standard drinks     Types: 2 Cans of beer per week     Comment: occassionally    Drug use: Not on file    Sexual activity: Never   Other Topics Concern    Not on file   Social History Narrative    Not on file     Social Determinants of Health     Financial Resource Strain: Low Risk     Difficulty of Paying Living Expenses: Not hard at all   Food Insecurity: No Food Insecurity    Worried About [address] in the Last Year: Never true    [address] in the Last Year: Never true   Transportation Needs:     Lack of Transportation (Medical):      Lack of Transportation (Non-Medical):    Physical Activity:     Days of muscular tenderness. Lymphadenopathy:      Cervical: No cervical adenopathy. Skin:     General: Skin is warm. Findings: No erythema or rash. Neurological:      General: No focal deficit present. Mental Status: He is alert and oriented to person, place, and time. Psychiatric:         Mood and Affect: Mood normal.         Behavior: Behavior normal.         ASSESSMENT/PLAN:  1. JULIAN (obstructive sleep apnea)  Currently well controlled on CPAP. Patient follows with sleep medicine. 2. Atypical lymphocytosis  Patient had atypical lymphocytes on prior CBC in November 2020. Patient had blood smear that was normal.  Patient never had repeat CBC completed. -We will repeat CBC at this time to see if this is resolved. - CBC Auto Differential; Future    3. Class 1 obesity due to excess calories without serious comorbidity with body mass index (BMI) of 31.0 to 31.9 in adult  BMI 31.8 will screen for diabetes hyperlipidemia and WHITLEY. 4. Annual physical  Patient up-to-date on all of his health maintenance. Patient believes he had chickenpox as a child.  -Discussed sunscreen use, seatbelt safety  -Patient has carbon monoxide and fire alarms within the home that have recently been replaced  -No firearms within the home  -Discussed healthy diet and exercise    Return in about 1 year (around 7/1/2022).      The DO Heidi

## 2021-07-02 ENCOUNTER — TELEPHONE (OUTPATIENT)
Dept: INTERNAL MEDICINE CLINIC | Age: 31
End: 2021-07-02

## 2021-07-02 DIAGNOSIS — R79.89 ELEVATED LFTS: Primary | ICD-10-CM

## 2021-07-02 NOTE — TELEPHONE ENCOUNTER
Patient called to have results read he will repeat the labs in 3-6 months states that he does drink about 2-3 beers per week.

## 2021-07-09 ENCOUNTER — PATIENT MESSAGE (OUTPATIENT)
Dept: INTERNAL MEDICINE CLINIC | Age: 31
End: 2021-07-09

## 2021-07-09 ENCOUNTER — E-VISIT (OUTPATIENT)
Dept: OTHER | Facility: CLINIC | Age: 31
End: 2021-07-09

## 2021-07-09 DIAGNOSIS — B35.6 TINEA CRURIS: Primary | ICD-10-CM

## 2021-07-09 NOTE — TELEPHONE ENCOUNTER
From: Moon Queen  To: Batool Serrato DO  Sent: 7/9/2021 9:19 AM EDT  Subject: Non-Urgent Medical Question    Hello,  I didn't mention this during my initial appointment a couple weeks ago because it had finally seemed to go away but this week it came back again. Basically I've had a recurring jock itch issue that I've been treating with either creams (clotrimazole or butenafine hydrochloride) or sprays. Now twice it has seemed to go away, only for it to come back even though I'm still treating it regularly and trying to keep the area as dry as possible with the help of gold bond powder. Sometimes it is difficult with the heat but I still try to clean up a couple times a day. Is there anything I can do that will be more effective than the creams?   Thanks,  Radha Vee

## 2021-07-09 NOTE — TELEPHONE ENCOUNTER
Can we see if he would be willing to do an evisit and upload photos. My concern is maybe this is not \"jock itch\" and that is why he is having treatment failure with otc medications.      Thank you,  AM

## 2021-08-05 DIAGNOSIS — R21 RASH: Primary | ICD-10-CM

## 2021-08-12 DIAGNOSIS — B35.6 TINEA CRURIS: ICD-10-CM

## 2021-09-15 ENCOUNTER — OFFICE VISIT (OUTPATIENT)
Dept: PULMONOLOGY | Age: 31
End: 2021-09-15
Payer: COMMERCIAL

## 2021-09-15 VITALS
BODY MASS INDEX: 32.46 KG/M2 | SYSTOLIC BLOOD PRESSURE: 118 MMHG | OXYGEN SATURATION: 97 % | HEART RATE: 66 BPM | DIASTOLIC BLOOD PRESSURE: 62 MMHG | HEIGHT: 68 IN | WEIGHT: 214.2 LBS

## 2021-09-15 DIAGNOSIS — G47.33 OSA (OBSTRUCTIVE SLEEP APNEA): Chronic | ICD-10-CM

## 2021-09-15 DIAGNOSIS — K21.9 GERD WITHOUT ESOPHAGITIS: Chronic | ICD-10-CM

## 2021-09-15 DIAGNOSIS — J30.9 CHRONIC ALLERGIC RHINITIS: Chronic | ICD-10-CM

## 2021-09-15 DIAGNOSIS — E66.09 CLASS 1 OBESITY DUE TO EXCESS CALORIES WITH SERIOUS COMORBIDITY AND BODY MASS INDEX (BMI) OF 32.0 TO 32.9 IN ADULT: Chronic | ICD-10-CM

## 2021-09-15 PROBLEM — E66.811 CLASS 1 OBESITY DUE TO EXCESS CALORIES WITHOUT SERIOUS COMORBIDITY WITH BODY MASS INDEX (BMI) OF 31.0 TO 31.9 IN ADULT: Chronic | Status: ACTIVE | Noted: 2018-05-30

## 2021-09-15 PROCEDURE — 99214 OFFICE O/P EST MOD 30 MIN: CPT | Performed by: INTERNAL MEDICINE

## 2021-09-15 ASSESSMENT — SLEEP AND FATIGUE QUESTIONNAIRES
HOW LIKELY ARE YOU TO NOD OFF OR FALL ASLEEP WHILE WATCHING TV: 1
HOW LIKELY ARE YOU TO NOD OFF OR FALL ASLEEP IN A CAR, WHILE STOPPED FOR A FEW MINUTES IN TRAFFIC: 0
HOW LIKELY ARE YOU TO NOD OFF OR FALL ASLEEP WHILE SITTING AND READING: 1
HOW LIKELY ARE YOU TO NOD OFF OR FALL ASLEEP WHILE SITTING INACTIVE IN A PUBLIC PLACE: 0
HOW LIKELY ARE YOU TO NOD OFF OR FALL ASLEEP WHILE SITTING AND TALKING TO SOMEONE: 0
HOW LIKELY ARE YOU TO NOD OFF OR FALL ASLEEP WHILE LYING DOWN TO REST IN THE AFTERNOON WHEN CIRCUMSTANCES PERMIT: 1
HOW LIKELY ARE YOU TO NOD OFF OR FALL ASLEEP WHEN YOU ARE A PASSENGER IN A CAR FOR AN HOUR WITHOUT A BREAK: 1
HOW LIKELY ARE YOU TO NOD OFF OR FALL ASLEEP WHILE SITTING QUIETLY AFTER LUNCH WITHOUT ALCOHOL: 0
ESS TOTAL SCORE: 4

## 2021-09-15 NOTE — ASSESSMENT & PLAN NOTE
Chronic-Stable: Reviewed and analyzed results of physiologic download from patient's machine and reviewed with patient. Supplies and parts as needed for his machine. These are medically necessary. Limit caffeine use after 3pm.  Compliant his machine per FAA guidelines at this time. The patient did receive his new machine per the  recall but has not started using it yet. We discussed some of the features and encouraged him to start using the machine when he is ready. See him back in a 6-month follow-up for compliance checks per [facility] regulations.

## 2021-09-15 NOTE — ASSESSMENT & PLAN NOTE
Chronic- Stable. Discussed the importance of treating his allergies to help with his compliance on his CPAP machine.

## 2021-09-15 NOTE — PROGRESS NOTES
further testing and management. Diagnoses of JULIAN (obstructive sleep apnea), Chronic allergic rhinitis, GERD without esophagitis, and Class 1 obesity due to excess calories with serious comorbidity and body mass index (BMI) of 32.0 to 32.9 in adult were pertinent to this visit. The chronic medical conditions listed are directly related to the primary diagnosis listed above. The management of the primary diagnosis affects the secondary diagnosis and vice versa. Subjective:   Subjective   Patient ID: Catracho Briscoe is a 28 y.o. male. Chief Complaint   Patient presents with    Sleep Apnea       HPI:  Machine Modem/Download Info:  Compliance (hours/night): 6 hrs/night  % of nights >= 4 hrs: 91.1 %  Download AHI (/hour): 1.5 /HR  Average CPAP Pressure : 10.2 cmH2O     APAP - Settings  Pressure Min: 9 cmH2O  Pressure Max: 17 cmH2O   Comfort Settings  Humidity Level (0-8): 4  Flex/EPR (0-3): 3       Has been more consistent with the use of his medications for his allergies which has allowed him to be more consistent with the use of his machine. Pressures feel good and he feels like he sleeping well. He did receive his new machine for the manufacture recall but has not started using it yet. Is not sleepy when he drives nor flying. [facility]    [place] - Total score: 4    Social History     Socioeconomic History    Marital status: Single     Spouse name: Not on file    Number of children: Not on file    Years of education: Not on file    Highest education level: Not on file   Occupational History    Not on file   Tobacco Use    Smoking status: Never Smoker    Smokeless tobacco: Never Used   Vaping Use    Vaping Use: Never used   Substance and Sexual Activity    Alcohol use:  Yes     Alcohol/week: 2.0 standard drinks     Types: 2 Cans of beer per week     Comment: occassionally    Drug use: Not on file    Sexual activity: Never   Other Topics Concern    Not on file   Social History Narrative    Not on file     Social Determinants of Health     Financial Resource Strain: Low Risk     Difficulty of Paying Living Expenses: Not hard at all   Food Insecurity: No Food Insecurity    Worried About Running Out of Food in the Last Year: Never true    [address] in the Last Year: Never true   Transportation Needs:     Lack of Transportation (Medical):  Lack of Transportation (Non-Medical):    Physical Activity:     Days of Exercise per Week:     Minutes of Exercise per Session:    Stress:     Feeling of Stress :    Social Connections:     Frequency of Communication with Friends and Family:     Frequency of Social Gatherings with Friends and Family:     Attends Adventism Services:     Active Member of Clubs or Organizations:     Attends Club or Organization Meetings:     Marital Status:    Intimate Partner Violence:     Fear of Current or Ex-Partner:     Emotionally Abused:     Physically Abused:     Sexually Abused:        Current Outpatient Medications   Medication Instructions    econazole nitrate 1 % cream Apply topically daily.  famotidine (PEPCID) 20 mg, Oral, 2 TIMES DAILY    Fexofenadine HCl (ALLEGRA PO) Oral    fluticasone (FLONASE) 50 MCG/ACT nasal spray 2 sprays, Each Nostril, EVERY EVENING          Vitals:  Weight BMI   Wt Readings from Last 3 Encounters:   09/15/21 214 lb 3.2 oz (97.2 kg)   07/01/21 209 lb 3.2 oz (94.9 kg)   06/09/21 210 lb 6.4 oz (95.4 kg)    Body mass index is 32.57 kg/m².      BP HR SaO2   BP Readings from Last 3 Encounters:   09/15/21 118/62   07/01/21 110/60   06/09/21 126/80    Pulse Readings from Last 3 Encounters:   09/15/21 66   07/01/21 58   06/09/21 56    SpO2 Readings from Last 3 Encounters:   09/15/21 97%   07/01/21 98%   06/09/21 98%        Electronically signed by Kailee Wade MD on 9/15/2021 at 9:59 AM

## 2022-02-05 ENCOUNTER — OFFICE VISIT (OUTPATIENT)
Dept: ORTHOPEDIC SURGERY | Age: 32
End: 2022-02-05
Payer: COMMERCIAL

## 2022-02-05 VITALS — BODY MASS INDEX: 31.83 KG/M2 | RESPIRATION RATE: 12 BRPM | HEIGHT: 68 IN | WEIGHT: 210 LBS

## 2022-02-05 DIAGNOSIS — Z98.890 HX OF ANTERIOR CRUCIATE LIGAMENT TEAR RECONSTRUCTION: ICD-10-CM

## 2022-02-05 DIAGNOSIS — M25.562 LEFT KNEE PAIN, UNSPECIFIED CHRONICITY: Primary | ICD-10-CM

## 2022-02-05 DIAGNOSIS — S83.282A TEAR OF LATERAL MENISCUS OF LEFT KNEE, CURRENT, UNSPECIFIED TEAR TYPE, INITIAL ENCOUNTER: ICD-10-CM

## 2022-02-05 PROCEDURE — 99203 OFFICE O/P NEW LOW 30 MIN: CPT | Performed by: PHYSICIAN ASSISTANT

## 2022-02-05 RX ORDER — TACROLIMUS 1 MG/G
OINTMENT TOPICAL
COMMUNITY
Start: 2022-01-25 | End: 2022-09-09

## 2022-02-05 NOTE — PROGRESS NOTES
Subjective:      Patient ID: Babatunde Fernandez is a 33 y.o. male who is here in the [address] [place] [address] for an initial evaluation of left knee pain and swelling. HPI:   He is a  . He injured his knee 2 weeks ago during wrestling practice when he stepped awkwardly and his left knee buckled. He did develop swelling acutely after the injury. He has been seen by the school's ATC, treated with ibuprofen and Tylenol as well as ice. Symptoms have somewhat improved. He does have a history of prior left knee ACL reconstruction approximately 10 years ago. .   Location of pain medial and lateral left knee. Pain is worse with squatting, twisting. Pain improves with rest.       Review of Systems:  I have reviewed the clinically relevant past medical history, medications, allergies, family history, social history, and 13 point Review of Systems from the patient's recent history form & documented any details relevant to today's presenting complaints in the history above. The patient's self-reported past medical history, medications, allergies, family history, social history, and Review of Systems form from today's date have been scanned into the chart under the \"Media\" tab. As outlined in the HPI. Negative for fever or chills. Negative for poly-joint pain, swelling and stiffness. Negative for numbness or tingling into the affected extremity.      Past Medical History:   Diagnosis Date    Chronic allergic rhinitis 5/30/2018    GERD without esophagitis 5/30/2018       Family History   Problem Relation Age of Onset    Heart Disease Father     High Blood Pressure Father     Glaucoma Sister        Past Surgical History:   Procedure Laterality Date    ARM SURGERY  2005    ELBOW SURGERY Right     KNEE SURGERY      TONSILLECTOMY      UPPER GASTROINTESTINAL ENDOSCOPY  11/17/2016    bx       Social History     Occupational History    Not on file   Tobacco Use    Smoking status: Never Smoker    Smokeless tobacco: Never Used   Vaping Use    Vaping Use: Never used   Substance and Sexual Activity    Alcohol use: Yes     Alcohol/week: 2.0 standard drinks     Types: 2 Cans of beer per week     Comment: occassionally    Drug use: Not on file    Sexual activity: Never       Current Outpatient Medications   Medication Sig Dispense Refill    tacrolimus (PROTOPIC) 0.1 % ointment Apply twice daily as needed for flared areas.  econazole nitrate 1 % cream Apply topically daily. 1 Tube 0    fluticasone (FLONASE) 50 MCG/ACT nasal spray 2 sprays by Each Nostril route every evening      famotidine (PEPCID) 20 MG tablet Take 20 mg by mouth 2 times daily      Fexofenadine HCl (ALLEGRA PO) Take by mouth (Patient not taking: Reported on 2/5/2022)       No current facility-administered medications for this visit. Allergies   Allergen Reactions    Seasonal          Objective:     He is alert, oriented x 3, pleasant, well nourished, developed and in no acute distress. Resp 12   Ht 5' 8\" (1.727 m)   Wt 210 lb (95.3 kg)   BMI 31.93 kg/m²      Examination of the left knee: Inspection of the skin well-healed surgical wounds. Inspection of the soft tissues no swelling or erythema. The overall alignment of the knee is neutral.  Gait is mildly antalgic. There is a mild intra-articular effusion. AROM:           PROM:  Extension-         0                   130  Flexion -             0                   130  There mild pain associated with ROM testing. Medial joint line is somewhat tender to palpation. Lateral joint line is somewhat tender to palpation. Pes anserine bursa is non-tender to palpation. Patellar tendon is non-tender to palpation. Quadriceps tendon is non-tender to palpation. Collateral ligaments is non-tender to palpation. Popliteal fossa is non-tender to palpation. Varus Stress testing negative for laxity, pain or crepitus.   Valgus Stress testing negative for laxity, pain or crepitus. Lachman's test positive for  ACL laxity. Anterior Drawer test positive for ACL laxity. Posterior Drawer test negative for PCL laxity. Romel's Test positive for meniscus tear. Patellar Compression testing negative for pain or crepitus. Extensor Mechanism is intact. Lower extremities:  He has 5/5 motor strength of bilateral lower extremities. He has a negative straight leg raise, bilaterally. Deep tendon reflexes at knees and achilles are 2+. Sensation is intact to light touch L3 to S1 bilaterally. He has no clonus. Examination of the lower extremities shows intact perfusion to both lower extremities. He has no cyanosis and digigts are warm to touch, capillary refill is less than 2 seconds. He has no edema noted. He has intact skin without lacerations or abrasions, no significant erythema, rashes or skin lesions. X Rays: were performed in the office today:   AP Standing,Lateral and Sunrise of left Knee:   Postsurgical changes noted in the tibia and lateral femur. The alignment is anatomic. There are no radiographic findings to suggest fracture or dislocation. Additional Tests reviewed: none  Additional Outside Records reviewed: none    Diagnosis:       ICD-10-CM    1. Left knee pain, unspecified chronicity  M25.562 CANCELED: XR KNEE LEFT (MIN 4 VIEWS)   2. Tear of lateral meniscus of left knee, current, unspecified tear type, initial encounter  S83.282A    3. Hx of anterior cruciate ligament tear reconstruction  Z98.890         Assessment and Plan:       Assessment:  Left knee pain. History of prior ACL reconstruction 10 years ago. He does have mild laxity on testing of the ACL in the clinic today. He also has medial and lateral joint line pain with positive Romel's test.  This is concerning for possible meniscal tears.       I had an extensive discussion with Mr. Sadia Man regarding the natural history, significant errors to my attention for an addendum. All efforts were made to ensure that this office note is accurate.

## 2022-02-15 ENCOUNTER — OFFICE VISIT (OUTPATIENT)
Dept: ORTHOPEDIC SURGERY | Age: 32
End: 2022-02-15
Payer: COMMERCIAL

## 2022-02-15 VITALS — BODY MASS INDEX: 33.28 KG/M2 | HEIGHT: 68 IN | WEIGHT: 219.6 LBS

## 2022-02-15 DIAGNOSIS — S83.512A RUPTURE OF ANTERIOR CRUCIATE LIGAMENT OF LEFT KNEE, INITIAL ENCOUNTER: ICD-10-CM

## 2022-02-15 DIAGNOSIS — S83.282A TEAR OF LATERAL MENISCUS OF LEFT KNEE, CURRENT, UNSPECIFIED TEAR TYPE, INITIAL ENCOUNTER: Primary | ICD-10-CM

## 2022-02-15 PROCEDURE — 99214 OFFICE O/P EST MOD 30 MIN: CPT | Performed by: ORTHOPAEDIC SURGERY

## 2022-02-15 NOTE — PROGRESS NOTES
CHIEF COMPLAINT: Left knee pain. History:   Emile Guzman is a 33 y.o. male referred by MAREK Bello for Sports Medicine consultation for evaluation and treatment of left knee pain / injury. The patient complains of left knee pain. This is evaluated as a personal injury. The pain began 1 month ago. Rate pain 1-2/10. There was a history of injury. He is a  at [employer]. He states he was wrestling, and stepped awkwardly and his knee buckled and gave out. He felt a pop. He has a history of left knee ACL reconstruction with Achilles allograft in 2012 with Dr. Marni Yang. He states the knee had done well over the last 10 years. The pain is located deep in the knee. The knee has given out or felt unstable. The patient can bend and straighten the knee fully. There was swelling in the knee. There was not catching / locking of the knee. The patient has not had PT. The patient has not had an injection. The patient has taken NSAIDs. The patient has tried ice. The patient's occupation is . Outside reports reviewed: Meenakshi Cullen office note.     Past Medical History:   Diagnosis Date    Chronic allergic rhinitis 5/30/2018    GERD without esophagitis 5/30/2018       Past Surgical History:   Procedure Laterality Date    ARM SURGERY  2005    ELBOW SURGERY Right     KNEE SURGERY      TONSILLECTOMY      UPPER GASTROINTESTINAL ENDOSCOPY  11/17/2016    bx       Family History   Problem Relation Age of Onset    Heart Disease Father     High Blood Pressure Father     Glaucoma Sister        Social History     Socioeconomic History    Marital status: Single     Spouse name: None    Number of children: None    Years of education: None    Highest education level: None   Occupational History    None   Tobacco Use    Smoking status: Never Smoker    Smokeless tobacco: Never Used   Vaping Use    Vaping Use: Never used   Substance and Sexual Activity    Alcohol use: Yes     Alcohol/week: 2.0 standard drinks     Types: 2 Cans of beer per week     Comment: occassionally    Drug use: Never    Sexual activity: Never   Other Topics Concern    None   Social History Narrative    None     Social Determinants of Health     Financial Resource Strain: Low Risk     Difficulty of Paying Living Expenses: Not hard at all   Food Insecurity: No Food Insecurity    Worried About Running Out of Food in the Last Year: Never true    Erika of Food in the Last Year: Never true   Transportation Needs:     Lack of Transportation (Medical): Not on file    Lack of Transportation (Non-Medical): Not on file   Physical Activity:     Days of Exercise per Week: Not on file    Minutes of Exercise per Session: Not on file   Stress:     Feeling of Stress : Not on file   Social Connections:     Frequency of Communication with Friends and Family: Not on file    Frequency of Social Gatherings with Friends and Family: Not on file    Attends [employer]ism Services: Not on file    Active Member of [address] [place] [address] [employer] or Organizations: Not on file    Attends Club or Organization Meetings: Not on file    Marital Status: Not on file   Intimate Partner Violence:     Fear of Current or Ex-Partner: Not on file    Emotionally Abused: Not on file    Physically Abused: Not on file    Sexually Abused: Not on file   Housing Stability:     Unable to Pay for Housing in the Last Year: Not on file    Number of [place] in the Last Year: Not on file    Unstable Housing in the Last Year: Not on file       Current Outpatient Medications   Medication Sig Dispense Refill    tacrolimus (PROTOPIC) 0.1 % ointment Apply twice daily as needed for flared areas.  econazole nitrate 1 % cream Apply topically daily.  1 Tube 0    fluticasone (FLONASE) 50 MCG/ACT nasal spray 2 sprays by Each Nostril route every evening      Fexofenadine HCl (ALLEGRA PO) Take by mouth       famotidine (PEPCID) 20 MG tablet Take 20 mg by mouth 2 times daily       No current facility-administered medications for this visit. Allergies   Allergen Reactions    Seasonal        Review of Systems:  I have reviewed the clinically relevant past medical history, medications, allergies, family history, social history, and 13 point Review of Systems from the patient's recent history form & documented any details relevant to today's presenting complaints in the history above. The patient's self-reported past medical history, medications, allergies, family history, social history, and Review of Systems form from 2/5/22 have been scanned into the chart under the \"Media\" tab. Physical Examination:     Vital signs:   Ht 5' 8\" (1.727 m)   Wt 219 lb 9.6 oz (99.6 kg)   BMI 33.39 kg/m²     General:  alert, appears stated age, cooperative and no distress   Gait:  Normal. The patient can bear weight on the injured extremity. Left Knee  Alignment:  neutral   ROM:  0 degrees extension to 120 degrees flexion   Bilateral knees   Crepitus:  no   Joint Tenderness:  lateral joint line   Effusion:   0 cc   Patellar excursion:  2 of 4 quadrants    Patellar tilt test:  negative   Patellar facet tenderness:  negative medial   negative lateral   Patellar apprehension test:  negative   Lachman test:  positive   Right knee: negative   Anterior drawer test:  equivocal secondary to guarding   Right knee: negative   Posterior drawer:   negative    Right knee: negative   Varus laxity at 30 degrees:  negative   Right knee: negative   Valgus laxity at 30 degrees:   negative   Right knee: negative   Romel's test:  negative   Right knee: negative     There is not any cellulitis, lymphedema or cutaneous lesions noted in the lower extremities. Motor exam of the lower extremities show quadriceps, hamstrings, foot dorsiflexion and plantarflexion grossly intact. Sensation to both feet is grossly intact to light touch.   The bilateral lower extremities are warm and well-perfused with brisk capillary refill. Imaging:  Left knee X-Ray 2/5/22: reviewed. No fracture or dislocation. Appears to be appropriate tibial tunnel. Femoral tunnel not well visualized. Left knee MRI 2/12/22 [facility]:  I independently reviewed the images, as well as the radiology report. 1. Status post ACL reconstruction.  Recurrent graft tear. 2. Thin Wrisberg rip-type tear of posterior root and horn of the lateral meniscus red-red zone. 3. Subchondral fracture of posterior lateral tibial plateau and terminal sulcus of lateral    femoral condyle. I measured the tibial tunnel to be approximately 10 mm. The femoral tunnel appears to be well filled with bone from the Achilles allograft. Tunnels appear to be in relatively appropriate position. Assessment:     Left knee ACL tear  Left knee lateral meniscus tear      Plan:     Natural history and expected course discussed. Questions answered. I had an extensive discussion with Mr. Levar Samayoa and/or family regarding the natural history, etiology, and long term consequences of his condition. I have presented reasonable alternatives to the patient's proposed care, treatment, and services. I have outlined a treatment plan with them and, in my opinion, surgical intervention is indicated at this time. Discussion I have had encompassed risks, benefits, and side effects related to the alternatives and the risks related to not receiving the proposed care, treatment, and services. I have discussed the potential complications (including, but not limited to injury to nerve or blood vessel, infection, bleeding, DVT or PE, stiffness, incomplete pain relief, need for further surgery, and anesthetic complications), limitations, expectations, alternatives, and risks of the surgical procedure. We also discussed the importance of postoperative rehabilitation. He has had full opportunity to ask his questions. I have answered them all to his satisfaction.   I feel that Mr. Jackson See understands our discussion today and he will provide written informed consent for the procedure. Plan for left knee arthroscopy, revision anterior cruciate ligament reconstruction with quadriceps tendon allograft, and partial lateral meniscectomy versus repair    I will perform his H&P on the day of surgery. Procedures    Breg T Scope Knee Brace     Patient was prescribed a Breg T-Scope Brace. The left knee will require stabilization / immobilization from this semi-rigid / rigid orthosis to improve their function. The orthosis will assist in protecting the affected area, provide functional support and facilitate healing. The prefabricated orthosis was modified in the following manner to provide a customizable fit for the patient at the time of delivery. 1.  Identification of appropriate positioning and alignment of anatomical landmarks. 2.  Trimming of straps and adjustment of frame to specifically fit patient. 3.  Polycentric hinge adjustment in flexion and extension. The patient was educated and fit by a healthcare professional with expert knowledge and specialization in brace application while under the direct supervision of the treating physician. Verbal and written instructions for the use of and application of this item were provided. They were instructed to contact the office immediately should the brace result in increased pain, decreased sensation, increased swelling or worsening of the condition. Lillie Newton. Sylvester Cruz MD  Orthopaedic Surgery and Sports Medicine     Disclaimer: This note was generated with use of a verbal recognition program and an attempt was made to check for errors. It is possible that there are still dictated errors within this office note. If so, please bring any significant errors to my attention for an addendum. All efforts were made to ensure that this office note is accurate.

## 2022-02-15 NOTE — LETTER
Surgery Scheduling Form:    Patient Name:  Vita Covarrubias  Patient :  1990     Patient MR#:  [id number]    Patient Address:  [address] [facility] [address]    Surgeon:  Clif Watts. Chu Velazquez M.D.    PCP:  Cyrus Mora DO  Insurance:    Payor/Plan Subscr  Sub. Ins. ID Effective Group Num   1. PROGRESSIVE -* ERIKA YOUNG M [id number] [employer]    2. [address] -* Erik Lopez M 1990 [id number]     Diagnosis:  Left knee anterior cruciate ligament tear S83.512A, lateral meniscus tear S83.282A    Operation:  Left knee arthroscopy, anterior cruciate ligament reconstruction with quadriceps tendon allograft, partial lateral meniscectomy versus repair. [id number], 16132 versus 95537     Location:  [facility]  Surgeon's Scheduling Instruction:  elective  Requested Date: 2022  OR Time: 9:55am      Patient Arrival Time:  8:00am  OR Time Required:  150  Minutes    Anesthesia:  General and Regional Block  Surgical Assistant required:  Yes   Equipment:  Arthrex. Need a 10mm quad tendon allograft. Fastfix  Standard C-Arm:  No   Mini C-Arm:  No  Status:  Outpatient  PAT Required:  Yes    Comments: I will do H&P  Covid: patient to complete preoperatively within 6 days, bring the day of surgery         Clif Watts. Chu Velazquez MD      2/15/22 12:48 PM EST                                                            Pre-Admission Testing Order Set   In accordance with our formulary system, a generic equivalent drug may be dispensed and administered unless a D.A. W. is written with the medication order  All orders without checkboxes will processed automatically unless crossed out. Orders with checkboxes MUST be checked in order to be carried out.     Vita Covarrubias                                                        1990  Date of Procedure/Surgery:2022 Left knee arthroscopy, anterior cruciate ligament reconstruction with quadriceps tendon allograft, partial lateral meniscectomy versus repair  1. H & P for ALL procedure/surgery completed within 30 days, to be updated day of procedure/surgery  a. to be completed by Dr. Rhonda Jack the day of the procedure  2. [ ]Consults: PT/OT evaluation  3. [X ]Defer to Anesthesia for Pre-Admission testing orders  4. Diagnostic Tests:  [ ]EKG (if not completed in last 3 months) IF: (check all that apply)   Other:  [ Lisa Vernon (if not completed in last 6 months) IF: (check all that apply)   Hx Malignancy   Hx CVS/Thoracic Surg   CVS Disease   Hx Pneumonia last 3 months   Chronic Pulm Disease  Other:  [ ]Radiology   Knee Right Left Standing AP knee, hip to ankle on scoliosis film Other:  5. Labs  [ ]Basic Metabolic Profile  IF: (check all that apply)  [ ]Albumin    Other:     ________________________________________________________________  [ ]CBC without diff   Pre op exam      ________________________________________________________________  [ ]PT/INR  PTT   Pre op exam         ________________________________________________________________  [ ]Type & Screen   Surg with potiential for signif. blood loss  [ ]Type & cross match 2 units         ________________________________________________________________  [ ]Urine routine reflex to culture (UAR) If cultures positive, repeat on                  admission [ Lalo  catch urine  [ ]Nasal culture for MRSA   [ ]Nasal MRSA culture  ________________________________________________________________  6. [ ]Other:    TO: __________________________________ Date: _______ Time:_________    Physician Signature:         2/15/2022    2:49 PM     Pre-operative Physician Prophylaxis Orders    Guillermina Shaffer  1990  All orders without checkboxes will be processed automatically unless crossed out. Orders with checkboxes MUST be checked in order to be carried out. 1. Allergies: Seasonal  2.  Procedure: Left knee arthroscopy, anterior cruciate ligament reconstruction with quadriceps tendon allograft, partial lateral Male meniscectomy versus repair       Ht Readings from Last 1 Encounters:   02/15/22 5' 8\" (1.727 m)       Wt Readings from Last 1 Encounters:   02/15/22 219 lb 9.6 oz (99.6 kg)   4. [ ] DVT Prophylaxis-Contraindication (Do not give)  Allergy to heparin Currently on anticoagulation  Not indicated, low risk patient  Thrombocytopenia Admitted for bleeding diagnosis Surgery or invasive procedure  Laura ] Sequential Compression Boots to unaffected or bilateral extremities  [ ] Venous Compression Hose (KEDAR hose) to unaffected or bilateral extremities        Knee high  [ ] Heparin 5,000 units subcutaneously 1-2 hours pre op into the thigh opposite of operative site  5.   [ ] Beta Blocker  Patient to take beta blocker the morning of surgery with a sip of water as prescribed at home  Other:  6. Laura ] Antimicrobial Prophylaxis (Consensus Guideline Recommendations) for       S.C.I. P. procedures  All antibiotics to be initiated 30 minutes pre-op (prior to leaving pre-op hold area/ACU) EXCEPT: Vancomycin and Ciprofloxacin. Initiate Vancomycin and Ciprofloxacin 2 hours pre-op. For combination antibiotic orders, start Ciprofloxacin first, then start second antibiotic ordered. In house patients, send pre-op antibiotics with patient to pre-op hold, do not initiate.   Surgical Procedure Routine pre-op Antibiotic  Penicillin or Cephalosporin Allergy  Orthopaedic  X Cefazolin (Ancef) 2 gm IVPB x1 X Clindamycin 900 mg IVPB x1    or     If > 80 kg Cefazolin 2 gm IVPB x1 Vancomycin 1 gm IVPB x1  Approved indications for Vancomycin:  MRSA related chronic wound dialysis  Other antibiotic to be given:  TO: ________________________________Date: ____________ Time: _______  Physician Signature: Date: 2/15/2022      Time: 2:49 PM

## 2022-02-15 NOTE — LETTER
Your surgery has been scheduled with Dr. Holden Gtz. DATE OF SURGERY:      2/25/2022     ARRIVAL TIME:      8:00am     TIME OF SURGERY:      10:00am     LOCATION: [address] [place], [place] [address] [address]    **PLEASE NOTE: Due to medical conditions, your surgery time is subject to change. If this is the case, you will receive a call from the hospital or clinic staff to notify you.**    Report to Surgery Registration, which is located in the main lobby of the surgery center. PLEASE DO NOT EAT OR DRINK ANYTHING AFTER MIDNIGHT THE NIGHT BEFORE YOUR SURGERY. YOU WILL ALSO NEED TO HAVE A . POST-OP APPOINTMENT: 2/28/2022 at 3:45pm at [address] [place] [address]       You will need to make an appointment with your family physician so he/she can do a pre-operative history and physical.  The history & physical cannot be completed more than 30 days prior to surgery. Please have the family physician fax the completed form to [facility]) at the number on your packet. Your pre-operative instructions and history and physical forms are included in this folder. Due to the [address] pandemic you will need to be tested prior to your surgery, unless otherwise noted. This test will need to be negative by the date of surgery. This may be required regardless of vaccination status. The hospital also requires several routine tests that will be done the day of your surgery. Because your surgery is scheduled as an outpatient procedure it will be necessary to have a responsible person with you for transportation. Please review all information given to you prior to your surgery date. If there are any questions, please do not hesitate to call our office at [phone]. Dear Patient:    As a valued customer, we would like to thank you for choosing [facility] for your upcoming surgery/procedure.     CHECKLIST FOR SURGERY:    []History & physical exam by your primary care physician within 30 days of your surgery date. To be done the day of your surgery. [x]   COVID testing within 6 days of surgery. (This may be required regardless of vaccination status.) This can be done at your Primary Care office, an urgent care, [address] will need to bring the negative result with you to surgery. Failure to do so will result in cancellation of your surgery. The test will need to be completed during this time frame: 2/19/2022-2/23/2022    [x] No aspirin products or blood thinners for one week prior to surgery. This includes NSAIDs, such as Aleve, Advil, Ibuprofen, Motrin, Naproxen, etc.    **If you are on an anti-coagulant, such as Plavix, Brilinta, Warfarin, etc., please consult your prescribing provider regarding stopping this medication. [x] Medical clearance by a cardiologist, pulmonologist, or other specialist(s) if you are under their care. [x] Nothing to eat or drink after midnight the night before surgery. [x] You will need to have a  the day of surgery. [x] Inform office of any changes in insurance, address or phone numbers. [] Provide a copy of your advanced directive/durable power of /living will on the day of your surgery/procedure. ** You will receive at least two calls from the Hospital.  One will be from Registration to pre-register you and go over your address, phone number, and insurance information. You will also hear from the Pre-Admission testing nurses. They will want to get a brief medical history and also go over your list of medications.

## 2022-02-16 ENCOUNTER — TELEPHONE (OUTPATIENT)
Dept: ORTHOPEDIC SURGERY | Age: 32
End: 2022-02-16

## 2022-02-16 NOTE — TELEPHONE ENCOUNTER
Auth: NPR  Date: 02/25/22  Reference # [id number]  Spoke with: ZINA  Type of SX: Outpatient  Location: [facility]  CPT: 67707, 10948, 45151   DX: S83.512A, [phone]  SX area: Lt knee  Insurance: [employer]

## 2022-02-24 ENCOUNTER — TELEPHONE (OUTPATIENT)
Dept: ORTHOPEDIC SURGERY | Age: 32
End: 2022-02-24

## 2022-02-24 ENCOUNTER — ANESTHESIA EVENT (OUTPATIENT)
Dept: OPERATING ROOM | Age: 32
End: 2022-02-24
Payer: COMMERCIAL

## 2022-02-24 PROCEDURE — L1832 KO ADJ JNT POS R SUP PRE CST: HCPCS | Performed by: ORTHOPAEDIC SURGERY

## 2022-02-24 NOTE — TELEPHONE ENCOUNTER
L/M FOR PATIENT requesting patient stop by the W office tomorrow prior to going to the hospital to p/u knee brace to take with him to surgery.

## 2022-02-25 ENCOUNTER — ANESTHESIA (OUTPATIENT)
Dept: OPERATING ROOM | Age: 32
End: 2022-02-25
Payer: COMMERCIAL

## 2022-02-25 ENCOUNTER — HOSPITAL ENCOUNTER (OUTPATIENT)
Age: 32
Setting detail: OUTPATIENT SURGERY
Discharge: HOME | End: 2022-02-25
Attending: ORTHOPAEDIC SURGERY | Admitting: ORTHOPAEDIC SURGERY
Payer: COMMERCIAL

## 2022-02-25 VITALS
SYSTOLIC BLOOD PRESSURE: 128 MMHG | DIASTOLIC BLOOD PRESSURE: 74 MMHG | WEIGHT: 213.52 LBS | BODY MASS INDEX: 32.36 KG/M2 | TEMPERATURE: 97 F | HEIGHT: 68 IN | OXYGEN SATURATION: 96 % | RESPIRATION RATE: 16 BRPM | HEART RATE: 88 BPM

## 2022-02-25 VITALS
DIASTOLIC BLOOD PRESSURE: 56 MMHG | TEMPERATURE: 98.2 F | SYSTOLIC BLOOD PRESSURE: 118 MMHG | OXYGEN SATURATION: 97 % | RESPIRATION RATE: 7 BRPM

## 2022-02-25 DIAGNOSIS — S83.512A RUPTURE OF ANTERIOR CRUCIATE LIGAMENT OF LEFT KNEE, INITIAL ENCOUNTER: Primary | ICD-10-CM

## 2022-02-25 PROCEDURE — C1762 CONN TISS, HUMAN(INC FASCIA): HCPCS | Performed by: ORTHOPAEDIC SURGERY

## 2022-02-25 PROCEDURE — 3700000000 HC ANESTHESIA ATTENDED CARE: Performed by: ORTHOPAEDIC SURGERY

## 2022-02-25 PROCEDURE — 6360000002 HC RX W HCPCS: Performed by: ORTHOPAEDIC SURGERY

## 2022-02-25 PROCEDURE — 6370000000 HC RX 637 (ALT 250 FOR IP): Performed by: ORTHOPAEDIC SURGERY

## 2022-02-25 PROCEDURE — 6360000002 HC RX W HCPCS: Performed by: NURSE ANESTHETIST, CERTIFIED REGISTERED

## 2022-02-25 PROCEDURE — 2500000003 HC RX 250 WO HCPCS: Performed by: NURSE ANESTHETIST, CERTIFIED REGISTERED

## 2022-02-25 PROCEDURE — 2720000010 HC SURG SUPPLY STERILE: Performed by: ORTHOPAEDIC SURGERY

## 2022-02-25 PROCEDURE — 7100000011 HC PHASE II RECOVERY - ADDTL 15 MIN: Performed by: ORTHOPAEDIC SURGERY

## 2022-02-25 PROCEDURE — 29888 ARTHRS AID ACL RPR/AGMNTJ: CPT | Performed by: ORTHOPAEDIC SURGERY

## 2022-02-25 PROCEDURE — 3700000001 HC ADD 15 MINUTES (ANESTHESIA): Performed by: ORTHOPAEDIC SURGERY

## 2022-02-25 PROCEDURE — 7100000000 HC PACU RECOVERY - FIRST 15 MIN: Performed by: ORTHOPAEDIC SURGERY

## 2022-02-25 PROCEDURE — 6360000002 HC RX W HCPCS: Performed by: ANESTHESIOLOGY

## 2022-02-25 PROCEDURE — C1776 JOINT DEVICE (IMPLANTABLE): HCPCS | Performed by: ORTHOPAEDIC SURGERY

## 2022-02-25 PROCEDURE — C1713 ANCHOR/SCREW BN/BN,TIS/BN: HCPCS | Performed by: ORTHOPAEDIC SURGERY

## 2022-02-25 PROCEDURE — 3600000004 HC SURGERY LEVEL 4 BASE: Performed by: ORTHOPAEDIC SURGERY

## 2022-02-25 PROCEDURE — 7100000001 HC PACU RECOVERY - ADDTL 15 MIN: Performed by: ORTHOPAEDIC SURGERY

## 2022-02-25 PROCEDURE — A4217 STERILE WATER/SALINE, 500 ML: HCPCS | Performed by: ORTHOPAEDIC SURGERY

## 2022-02-25 PROCEDURE — 29881 ARTHRS KNE SRG MNISECTMY M/L: CPT | Performed by: ORTHOPAEDIC SURGERY

## 2022-02-25 PROCEDURE — 2709999900 HC NON-CHARGEABLE SUPPLY: Performed by: ORTHOPAEDIC SURGERY

## 2022-02-25 PROCEDURE — 2580000003 HC RX 258: Performed by: NURSE ANESTHETIST, CERTIFIED REGISTERED

## 2022-02-25 PROCEDURE — 3600000014 HC SURGERY LEVEL 4 ADDTL 15MIN: Performed by: ORTHOPAEDIC SURGERY

## 2022-02-25 PROCEDURE — 76942 ECHO GUIDE FOR BIOPSY: CPT | Performed by: ANESTHESIOLOGY

## 2022-02-25 PROCEDURE — 2580000003 HC RX 258: Performed by: ORTHOPAEDIC SURGERY

## 2022-02-25 PROCEDURE — 7100000010 HC PHASE II RECOVERY - FIRST 15 MIN: Performed by: ORTHOPAEDIC SURGERY

## 2022-02-25 DEVICE — QUADLINK™ (10.5 X 69 MM). SUTURED QUADRICEPS TENDON CONSTRUCT.
Type: IMPLANTABLE DEVICE | Site: KNEE | Status: FUNCTIONAL
Brand: FLEXIGRAFT®

## 2022-02-25 DEVICE — ANCHOR SUT 4.75X22MM PEEK DBL LD SWIVELOCK W/ TIGERTAIL: Type: IMPLANTABLE DEVICE | Site: KNEE | Status: FUNCTIONAL

## 2022-02-25 DEVICE — DEVICE GRFT FIX 4.75X19.1 MM BIOCOMPOSITE SWIVELOCK: Type: IMPLANTABLE DEVICE | Site: KNEE | Status: FUNCTIONAL

## 2022-02-25 DEVICE — BUTTON FIX W8XL12MM TI ATTCH SYS ALLGRFT CONSTRUCT FOR: Type: IMPLANTABLE DEVICE | Site: KNEE | Status: FUNCTIONAL

## 2022-02-25 RX ORDER — PROPOFOL 10 MG/ML
INJECTION, EMULSION INTRAVENOUS PRN
Status: DISCONTINUED | OUTPATIENT
Start: 2022-02-25 | End: 2022-02-25 | Stop reason: SDUPTHER

## 2022-02-25 RX ORDER — ONDANSETRON 2 MG/ML
INJECTION INTRAMUSCULAR; INTRAVENOUS PRN
Status: DISCONTINUED | OUTPATIENT
Start: 2022-02-25 | End: 2022-02-25 | Stop reason: SDUPTHER

## 2022-02-25 RX ORDER — DEXAMETHASONE SODIUM PHOSPHATE 4 MG/ML
INJECTION, SOLUTION INTRA-ARTICULAR; INTRALESIONAL; INTRAMUSCULAR; INTRAVENOUS; SOFT TISSUE PRN
Status: DISCONTINUED | OUTPATIENT
Start: 2022-02-25 | End: 2022-02-25 | Stop reason: SDUPTHER

## 2022-02-25 RX ORDER — GLYCOPYRROLATE 0.2 MG/ML
INJECTION INTRAMUSCULAR; INTRAVENOUS PRN
Status: DISCONTINUED | OUTPATIENT
Start: 2022-02-25 | End: 2022-02-25 | Stop reason: SDUPTHER

## 2022-02-25 RX ORDER — FENTANYL CITRATE 50 UG/ML
INJECTION, SOLUTION INTRAMUSCULAR; INTRAVENOUS PRN
Status: DISCONTINUED | OUTPATIENT
Start: 2022-02-25 | End: 2022-02-25 | Stop reason: SDUPTHER

## 2022-02-25 RX ORDER — MAGNESIUM HYDROXIDE 1200 MG/15ML
LIQUID ORAL CONTINUOUS PRN
Status: COMPLETED | OUTPATIENT
Start: 2022-02-25 | End: 2022-02-25

## 2022-02-25 RX ORDER — SODIUM CHLORIDE 9 MG/ML
25 INJECTION, SOLUTION INTRAVENOUS PRN
Status: DISCONTINUED | OUTPATIENT
Start: 2022-02-25 | End: 2022-02-25 | Stop reason: HOSPADM

## 2022-02-25 RX ORDER — MIDAZOLAM HYDROCHLORIDE 1 MG/ML
INJECTION INTRAMUSCULAR; INTRAVENOUS PRN
Status: DISCONTINUED | OUTPATIENT
Start: 2022-02-25 | End: 2022-02-25 | Stop reason: SDUPTHER

## 2022-02-25 RX ORDER — KETOROLAC TROMETHAMINE 30 MG/ML
INJECTION, SOLUTION INTRAMUSCULAR; INTRAVENOUS PRN
Status: DISCONTINUED | OUTPATIENT
Start: 2022-02-25 | End: 2022-02-25 | Stop reason: SDUPTHER

## 2022-02-25 RX ORDER — LIDOCAINE HYDROCHLORIDE 20 MG/ML
INJECTION, SOLUTION EPIDURAL; INFILTRATION; INTRACAUDAL; PERINEURAL PRN
Status: DISCONTINUED | OUTPATIENT
Start: 2022-02-25 | End: 2022-02-25 | Stop reason: SDUPTHER

## 2022-02-25 RX ORDER — SODIUM CHLORIDE 0.9 % (FLUSH) 0.9 %
5-40 SYRINGE (ML) INJECTION PRN
Status: DISCONTINUED | OUTPATIENT
Start: 2022-02-25 | End: 2022-02-25 | Stop reason: HOSPADM

## 2022-02-25 RX ORDER — SODIUM CHLORIDE, SODIUM LACTATE, POTASSIUM CHLORIDE, CALCIUM CHLORIDE 600; 310; 30; 20 MG/100ML; MG/100ML; MG/100ML; MG/100ML
INJECTION, SOLUTION INTRAVENOUS CONTINUOUS PRN
Status: COMPLETED | OUTPATIENT
Start: 2022-02-25 | End: 2022-02-25

## 2022-02-25 RX ORDER — OXYCODONE HYDROCHLORIDE AND ACETAMINOPHEN 5; 325 MG/1; MG/1
1 TABLET ORAL EVERY 4 HOURS PRN
Qty: 40 TABLET | Refills: 0 | Status: SHIPPED | OUTPATIENT
Start: 2022-02-25 | End: 2022-03-04

## 2022-02-25 RX ORDER — POVIDONE-IODINE 10 MG/G
OINTMENT TOPICAL
Status: COMPLETED | OUTPATIENT
Start: 2022-02-25 | End: 2022-02-25

## 2022-02-25 RX ORDER — SODIUM CHLORIDE 0.9 % (FLUSH) 0.9 %
5-40 SYRINGE (ML) INJECTION EVERY 12 HOURS SCHEDULED
Status: DISCONTINUED | OUTPATIENT
Start: 2022-02-25 | End: 2022-02-25 | Stop reason: HOSPADM

## 2022-02-25 RX ORDER — SODIUM CHLORIDE 9 MG/ML
INJECTION, SOLUTION INTRAVENOUS CONTINUOUS PRN
Status: DISCONTINUED | OUTPATIENT
Start: 2022-02-25 | End: 2022-02-25 | Stop reason: SDUPTHER

## 2022-02-25 RX ORDER — SODIUM CHLORIDE 0.9 % (FLUSH) 0.9 %
10 SYRINGE (ML) INJECTION EVERY 12 HOURS SCHEDULED
Status: DISCONTINUED | OUTPATIENT
Start: 2022-02-25 | End: 2022-02-25 | Stop reason: HOSPADM

## 2022-02-25 RX ORDER — SODIUM CHLORIDE 0.9 % (FLUSH) 0.9 %
10 SYRINGE (ML) INJECTION PRN
Status: DISCONTINUED | OUTPATIENT
Start: 2022-02-25 | End: 2022-02-25 | Stop reason: HOSPADM

## 2022-02-25 RX ORDER — ROPIVACAINE HYDROCHLORIDE 5 MG/ML
INJECTION, SOLUTION EPIDURAL; INFILTRATION; PERINEURAL
Status: COMPLETED | OUTPATIENT
Start: 2022-02-25 | End: 2022-02-25

## 2022-02-25 RX ORDER — ONDANSETRON 2 MG/ML
4 INJECTION INTRAMUSCULAR; INTRAVENOUS
Status: COMPLETED | OUTPATIENT
Start: 2022-02-25 | End: 2022-02-25

## 2022-02-25 RX ORDER — MEPERIDINE HYDROCHLORIDE 25 MG/ML
12.5 INJECTION INTRAMUSCULAR; INTRAVENOUS; SUBCUTANEOUS EVERY 5 MIN PRN
Status: DISCONTINUED | OUTPATIENT
Start: 2022-02-25 | End: 2022-02-25 | Stop reason: HOSPADM

## 2022-02-25 RX ORDER — DIPHENHYDRAMINE HYDROCHLORIDE 50 MG/ML
12.5 INJECTION INTRAMUSCULAR; INTRAVENOUS
Status: DISCONTINUED | OUTPATIENT
Start: 2022-02-25 | End: 2022-02-25 | Stop reason: HOSPADM

## 2022-02-25 RX ORDER — SODIUM CHLORIDE 9 MG/ML
INJECTION, SOLUTION INTRAVENOUS CONTINUOUS
Status: DISCONTINUED | OUTPATIENT
Start: 2022-02-25 | End: 2022-02-25 | Stop reason: HOSPADM

## 2022-02-25 RX ORDER — PROMETHAZINE HYDROCHLORIDE 25 MG/1
25 TABLET ORAL EVERY 6 HOURS PRN
Qty: 5 TABLET | Refills: 0 | Status: SHIPPED | OUTPATIENT
Start: 2022-02-25 | End: 2022-04-05 | Stop reason: ALTCHOICE

## 2022-02-25 RX ADMIN — ROPIVACAINE HYDROCHLORIDE 20 ML: 5 INJECTION, SOLUTION EPIDURAL; INFILTRATION; PERINEURAL at 09:35

## 2022-02-25 RX ADMIN — GLYCOPYRROLATE 0.2 MG: 0.2 INJECTION, SOLUTION INTRAMUSCULAR; INTRAVENOUS at 10:16

## 2022-02-25 RX ADMIN — KETOROLAC TROMETHAMINE 30 MG: 30 INJECTION, SOLUTION INTRAMUSCULAR at 10:30

## 2022-02-25 RX ADMIN — ONDANSETRON 4 MG: 2 INJECTION INTRAMUSCULAR; INTRAVENOUS at 12:20

## 2022-02-25 RX ADMIN — LIDOCAINE HYDROCHLORIDE 100 MG: 20 INJECTION, SOLUTION EPIDURAL; INFILTRATION; INTRACAUDAL; PERINEURAL at 10:21

## 2022-02-25 RX ADMIN — PROPOFOL 200 MG: 10 INJECTION, EMULSION INTRAVENOUS at 10:21

## 2022-02-25 RX ADMIN — SODIUM CHLORIDE: 9 INJECTION, SOLUTION INTRAVENOUS at 09:30

## 2022-02-25 RX ADMIN — FENTANYL CITRATE 50 MCG: 50 INJECTION INTRAMUSCULAR; INTRAVENOUS at 10:21

## 2022-02-25 RX ADMIN — MIDAZOLAM 2 MG: 1 INJECTION INTRAMUSCULAR; INTRAVENOUS at 09:30

## 2022-02-25 RX ADMIN — HYDROMORPHONE HYDROCHLORIDE 0.5 MG: 1 INJECTION, SOLUTION INTRAMUSCULAR; INTRAVENOUS; SUBCUTANEOUS at 11:44

## 2022-02-25 RX ADMIN — ONDANSETRON 4 MG: 2 INJECTION INTRAMUSCULAR; INTRAVENOUS at 14:13

## 2022-02-25 RX ADMIN — FENTANYL CITRATE 50 MCG: 50 INJECTION INTRAMUSCULAR; INTRAVENOUS at 09:30

## 2022-02-25 RX ADMIN — HYDROMORPHONE HYDROCHLORIDE 0.5 MG: 1 INJECTION, SOLUTION INTRAMUSCULAR; INTRAVENOUS; SUBCUTANEOUS at 11:55

## 2022-02-25 RX ADMIN — DEXAMETHASONE SODIUM PHOSPHATE 10 MG: 4 INJECTION, SOLUTION INTRAMUSCULAR; INTRAVENOUS at 10:30

## 2022-02-25 RX ADMIN — CEFAZOLIN 2 G: 10 INJECTION, POWDER, FOR SOLUTION INTRAVENOUS at 10:16

## 2022-02-25 RX ADMIN — SODIUM CHLORIDE: 9 INJECTION, SOLUTION INTRAVENOUS at 11:51

## 2022-02-25 ASSESSMENT — PULMONARY FUNCTION TESTS
PIF_VALUE: 17
PIF_VALUE: 16
PIF_VALUE: 9
PIF_VALUE: 16
PIF_VALUE: 0
PIF_VALUE: 9
PIF_VALUE: 17
PIF_VALUE: 16
PIF_VALUE: 3
PIF_VALUE: 12
PIF_VALUE: 12
PIF_VALUE: 17
PIF_VALUE: 17
PIF_VALUE: 6
PIF_VALUE: 16
PIF_VALUE: 17
PIF_VALUE: 12
PIF_VALUE: 0
PIF_VALUE: 16
PIF_VALUE: 2
PIF_VALUE: 9
PIF_VALUE: 17
PIF_VALUE: 12
PIF_VALUE: 17
PIF_VALUE: 16
PIF_VALUE: 17
PIF_VALUE: 16
PIF_VALUE: 9
PIF_VALUE: 17
PIF_VALUE: 16
PIF_VALUE: 11
PIF_VALUE: 12
PIF_VALUE: 16
PIF_VALUE: 16
PIF_VALUE: 17
PIF_VALUE: 17
PIF_VALUE: 9
PIF_VALUE: 17
PIF_VALUE: 16
PIF_VALUE: 12
PIF_VALUE: 17
PIF_VALUE: 12
PIF_VALUE: 3
PIF_VALUE: 17
PIF_VALUE: 13
PIF_VALUE: 16
PIF_VALUE: 12
PIF_VALUE: 17
PIF_VALUE: 9
PIF_VALUE: 17
PIF_VALUE: 17
PIF_VALUE: 12
PIF_VALUE: 17
PIF_VALUE: 16
PIF_VALUE: 17
PIF_VALUE: 16
PIF_VALUE: 17
PIF_VALUE: 1
PIF_VALUE: 4
PIF_VALUE: 17
PIF_VALUE: 16
PIF_VALUE: 17
PIF_VALUE: 11
PIF_VALUE: 17
PIF_VALUE: 0
PIF_VALUE: 17
PIF_VALUE: 12
PIF_VALUE: 1
PIF_VALUE: 9
PIF_VALUE: 16
PIF_VALUE: 17
PIF_VALUE: 16
PIF_VALUE: 17
PIF_VALUE: 16
PIF_VALUE: 13
PIF_VALUE: 9
PIF_VALUE: 27
PIF_VALUE: 17
PIF_VALUE: 17
PIF_VALUE: 16
PIF_VALUE: 16
PIF_VALUE: 17
PIF_VALUE: 16
PIF_VALUE: 17
PIF_VALUE: 13
PIF_VALUE: 17
PIF_VALUE: 12

## 2022-02-25 ASSESSMENT — PAIN SCALES - GENERAL
PAINLEVEL_OUTOF10: 0

## 2022-02-25 ASSESSMENT — ENCOUNTER SYMPTOMS: SHORTNESS OF BREATH: 0

## 2022-02-25 ASSESSMENT — PAIN - FUNCTIONAL ASSESSMENT: PAIN_FUNCTIONAL_ASSESSMENT: 0-10

## 2022-02-25 ASSESSMENT — LIFESTYLE VARIABLES: SMOKING_STATUS: 0

## 2022-02-25 NOTE — H&P
CHIEF COMPLAINT: Left knee pain.     History:   Leeann Strange is a 33 y.o. male here for left knee arthroscopy.         Past Medical History        Past Medical History:   Diagnosis Date    Chronic allergic rhinitis 5/30/2018    GERD without esophagitis 5/30/2018            Past Surgical History         Past Surgical History:   Procedure Laterality Date    ARM SURGERY   2005    ELBOW SURGERY Right      KNEE SURGERY        TONSILLECTOMY        UPPER GASTROINTESTINAL ENDOSCOPY   11/17/2016     bx            Family History         Family History   Problem Relation Age of Onset    Heart Disease Father      High Blood Pressure Father      Glaucoma Sister              Social History   Social History            Socioeconomic History    Marital status: Single       Spouse name: None    Number of children: None    Years of education: None    Highest education level: None   Occupational History    None   Tobacco Use    Smoking status: Never Smoker    Smokeless tobacco: Never Used   Vaping Use    Vaping Use: Never used   Substance and Sexual Activity    Alcohol use: Yes       Alcohol/week: 2.0 standard drinks       Types: 2 Cans of beer per week       Comment: occassionally    Drug use: Never    Sexual activity: Never   Other Topics Concern    None   Social History Narrative    None      Social Determinants of Health          Financial Resource Strain: Low Risk     Difficulty of Paying Living Expenses: Not hard at all   Food Insecurity: No Food Insecurity    Worried About Running Out of Food in the Last Year: Never true    Erika of Food in the Last Year: Never true   Transportation Needs:     Lack of Transportation (Medical): Not on file    Lack of Transportation (Non-Medical):  Not on file   Physical Activity:     Days of Exercise per Week: Not on file    Minutes of Exercise per Session: Not on file   Stress:     Feeling of Stress : Not on file   Social Connections:     Frequency of Communication with Friends and Family: Not on file    Frequency of Social Gatherings with Friends and Family: Not on file    Attends Zoroastrian Services: Not on file    Active Member of Clubs or Organizations: Not on file    Attends Club or Organization Meetings: Not on file    Marital Status: Not on file   Intimate Partner Violence:     Fear of Current or Ex-Partner: Not on file    Emotionally Abused: Not on file    Physically Abused: Not on file    Sexually Abused: Not on file   Housing Stability:     Unable to Pay for Housing in the Last Year: Not on file    Number of [place] in the Last Year: Not on file    Unstable Housing in the Last Year: Not on file            Current Facility-Administered Medications          Current Outpatient Medications   Medication Sig Dispense Refill    tacrolimus (PROTOPIC) 0.1 % ointment Apply twice daily as needed for flared areas.        econazole nitrate 1 % cream Apply topically daily. 1 Tube 0    fluticasone (FLONASE) 50 MCG/ACT nasal spray 2 sprays by Each Nostril route every evening        Fexofenadine HCl (ALLEGRA PO) Take by mouth         famotidine (PEPCID) 20 MG tablet Take 20 mg by mouth 2 times daily          No current facility-administered medications for this visit.                 Allergies   Allergen Reactions    Seasonal           Review of Systems:  I have reviewed the clinically relevant past medical history, medications, allergies, family history, social history, and 13 point Review of Systems from the patient's recent history form & documented any details relevant to today's presenting complaints in the history above.  The patient's self-reported past medical history, medications, allergies, family history, social history, and Review of Systems form from 2/5/22 have been scanned into the chart under the \"Media\" tab.        Physical Examination:   BP (!) 141/74   Pulse 75   Temp 97.5 °F (36.4 °C) (Temporal)   Resp 16   Ht 5' 8\" (1.727 m) Wt 213 lb 8.3 oz (96.9 kg)   SpO2 97%   BMI 32.46 kg/m²   Airway is intact  Chest: breathing comfortably  Heart: regular rate  Findings on exam of the body region where surgery is to be performed include: see last office and/or consult note           Imaging:  Left knee X-Ray 2/5/22: . No fracture or dislocation. Appears to be appropriate tibial tunnel. Femoral tunnel not well visualized. Left knee MRI 2/12/22 [facility]scan:   1. Status post ACL reconstruction.  Recurrent graft tear. 2. Thin Wrisberg rip-type tear of posterior root and horn of the lateral meniscus red-red zone. 3. Subchondral fracture of posterior lateral tibial plateau and terminal sulcus of lateral    femoral condyle. I measured the tibial tunnel to be approximately 10 mm. The femoral tunnel appears to be well filled with bone from the Achilles allograft.   Tunnels appear to be in relatively appropriate position.        Assessment:      Left knee ACL tear  Left knee lateral meniscus tear        Plan:       Plan for left knee arthroscopy, revision anterior cruciate ligament reconstruction with quadriceps tendon allograft, and partial lateral meniscectomy versus repair

## 2022-02-25 NOTE — PROGRESS NOTES
Pt awake but tired on arrival to phase II. Denies pain at present. VSS. Bends and straightens both feet. Numbness in left leg. Elevated left leg and placed ice pack to left knee. Given juice and cookies. Called for mother. Call light within reach.

## 2022-02-25 NOTE — PROGRESS NOTES
Pt arrived to the PACU from the OR sleeping with oral airway in place. No signs of pain at this time. Dressing dry and intact. VSS. Will continue to monitor.

## 2022-02-25 NOTE — PROGRESS NOTES
Nausea decreased. IV fluids infusing well. Discharge instructions reviewed with pt and mother. Both express an understanding of instructions. Pt starts a new job on Monday. Encourage pt to call surgeon to discuss if able to drive on Monday. Isaiah Joe in with prescriptions. Given soda.

## 2022-02-25 NOTE — PROGRESS NOTES
Assisted pt to dress on stretcher. Pt states that he knows how to use crutches. Up to wheelchair. Wheeled pt to discharge at [facility] car.

## 2022-02-25 NOTE — ANESTHESIA POSTPROCEDURE EVALUATION
Department of Anesthesiology  Postprocedure Note    Patient: Renetta Tsai  MRN: [id number]  YOB: 1990  Date of evaluation: 2/25/2022  Time:  2:14 PM     Procedure Summary     Date: 02/25/22 Room / Location: [facility] OR [address] [place] [address]    Anesthesia Start: 1687 Anesthesia Stop: 1239    Procedure: LEFT KNEE ARTHROSCOPY, ANTERIOR CRUCIATE LIGAMENT RECONSTRUCTION WITH QUADRICEPS TENDON ALLOGRAFT, PARTIAL LATERAL MENISCECTOMY VERSUS REPAIR (Left Knee) Diagnosis: (LEFT KNEE ANTERIOR CRUCIATE LIGAMENT TEAR, LATERAL MENISCUS TEAR)    Surgeons: Keisha Mittal MD Responsible Provider: Kya Patricia MD    Anesthesia Type: general, regional ASA Status: 2          Anesthesia Type: general, regional    Sheila Phase I: Sheila Score: 10    Shiela Phase II: Sheila Score: 10    Last vitals: Reviewed and per EMR flowsheets.        Anesthesia Post Evaluation    Level of consciousness: awake and alert  Airway patency: patent  Nausea & Vomiting: no nausea and no vomiting  Complications: no  Cardiovascular status: hemodynamically stable  Respiratory status: acceptable  Hydration status: stable

## 2022-02-25 NOTE — ANESTHESIA PROCEDURE NOTES
Peripheral Block    Patient location during procedure: PACU  Start time: 2/25/2022 9:30 AM  End time: 2/25/2022 9:35 AM  Staffing  Anesthesiologist: Yasmeen Cast MD  Preanesthetic Checklist  Completed: patient identified, IV checked, site marked, risks and benefits discussed, surgical consent, monitors and equipment checked, pre-op evaluation, timeout performed, anesthesia consent given, oxygen available and patient being monitored  Peripheral Block  Patient position: supine  Prep: ChloraPrep  Patient monitoring: cardiac monitor, continuous pulse ox, frequent blood pressure checks and IV access  Block type: Saphenous  Laterality: left  Injection technique: single-shot  Guidance: ultrasound guided  Provider prep: mask and sterile gloves  Needle  Needle type: short-bevel   Needle gauge: 22 G  Needle length: 10 cm  Needle localization: ultrasound guidance  Assessment  Injection assessment: negative aspiration for heme, no paresthesia on injection and local visualized surrounding nerve on ultrasound  Paresthesia pain: none  Slow fractionated injection: yes  Hemodynamics: stable  Additional Notes  Timeout performed with Marsha Kaiser RN    Sartorius and Vastus Medialis Muscle, Femoral artery and Saphenous nerve are identified; the tip of the needle and the spread of the local anesthetic around the Saphenous nerve are visualized. The Saphenous nerve appeared to be anatomically normal and there were no abnormal pathologically findings seen.    Medications Administered  Ropivacaine (NAROPIN) injection 0.5%, 20 mL  Reason for block: post-op pain management

## 2022-02-25 NOTE — PROGRESS NOTES
Pt still has nausea come and go. Emesis 400 cc yellow. Pt states \"I feel better now. \" Will finish infusing last 200 cc of IV fluids and then assist pt to dress.

## 2022-02-25 NOTE — ANESTHESIA PRE PROCEDURE
Department of Anesthesiology  Preprocedure Note       Name:  Calli Morelos   Age:  33 y.o.  :  1990                                          MRN:  [id number]         Date:  2022      Surgeon: Go Rolle):  Cyndee Lemus MD    Procedure: Procedure(s):  LEFT KNEE ARTHROSCOPY, ANTERIOR CRUCIATE LIGAMENT RECONSTRUCTION WITH QUADRICEPS TENDON ALLOGRAFT, PARTIAL LATERAL MENISCECTOMY VERSUS REPAIR    Medications prior to admission:   Prior to Admission medications    Medication Sig Start Date End Date Taking? Authorizing Provider   oxyCODONE-acetaminophen (PERCOCET) 5-325 MG per tablet Take 1 tablet by mouth every 4 hours as needed for Pain for up to 7 days. 2/25/22 3/4/22 Yes Cyndee Lemus MD   promethazine (PHENERGAN) 25 MG tablet Take 1 tablet by mouth every 6 hours as needed for Nausea 22  Yes Cyndee Lemus MD   tacrolimus (PROTOPIC) 0.1 % ointment Apply twice daily as needed for flared areas. 22  Yes Historical Provider, MD   econazole nitrate 1 % cream Apply topically daily. Patient taking differently: daily as needed Apply topically daily.  21  Yes Rosaura Stanford,    fluticasone (FLONASE) 50 MCG/ACT nasal spray 2 sprays by Each Nostril route every evening   Yes Historical Provider, MD   Fexofenadine HCl (ALLEGRA PO) Take by mouth daily as needed    Yes Historical Provider, MD   famotidine (PEPCID) 20 MG tablet Take 20 mg by mouth 2 times daily   Yes Historical Provider, MD       Current medications:    Current Facility-Administered Medications   Medication Dose Route Frequency Provider Last Rate Last Admin    ceFAZolin (ANCEF) 2000 mg in dextrose 5 % 100 mL IVPB  2,000 mg IntraVENous Once Cyndee Lemus MD        0.9 % sodium chloride infusion   IntraVENous Continuous Jian Mclaughlin MD        sodium chloride flush 0.9 % injection 10 mL  10 mL IntraVENous 2 times per day Jian Mclaughlin MD        sodium chloride flush 0.9 % injection 10 mL  10 mL IntraVENous PRN Ray Sang, MD        0.9 % sodium chloride infusion  25 mL IntraVENous PRN Ross Saba MD           Allergies: Allergies   Allergen Reactions    Seasonal        Problem List:    Patient Active Problem List   Diagnosis Code    Chronic allergic rhinitis J30.9    GERD without esophagitis K21.9    Class 1 obesity due to excess calories with serious comorbidity and body mass index (BMI) of 32.0 to 32.9 in adult E66.09, Z68.32    JULIAN (obstructive sleep apnea) G47.33    Hx of anterior cruciate ligament tear reconstruction Z98.890    Tear of lateral meniscus of left knee, current S83.282A    Left knee pain M25.562       Past Medical History:        Diagnosis Date    Chronic allergic rhinitis 5/30/2018    COVID-19     12/2020    GERD without esophagitis 5/30/2018    Sleep apnea     uses cpap machine       Past Surgical History:        Procedure Laterality Date    ARM SURGERY  2005    ELBOW SURGERY Right     KNEE SURGERY Left     TONSILLECTOMY      UPPER GASTROINTESTINAL ENDOSCOPY  11/17/2016    bx       Social History:    Social History     Tobacco Use    Smoking status: Never Smoker    Smokeless tobacco: Never Used   Substance Use Topics    Alcohol use:  Yes     Alcohol/week: 2.0 standard drinks     Types: 2 Cans of beer per week     Comment: occassionally                                Counseling given: Not Answered      Vital Signs (Current):   Vitals:    02/17/22 1438 02/25/22 0823   BP:  (!) 141/74   Pulse:  75   Resp:  16   Temp:  97.5 °F (36.4 °C)   TempSrc:  Temporal   SpO2:  97%   Weight: 219 lb (99.3 kg) 213 lb 8.3 oz (96.9 kg)   Height: 5' 8\" (1.727 m) 5' 8\" (1.727 m)                                              BP Readings from Last 3 Encounters:   02/25/22 (!) 141/74   09/15/21 118/62   07/01/21 110/60       NPO Status: Time of last liquid consumption: 2100                        Time of last solid consumption: 2100                        Date of last liquid consumption: 02/24/22 Date of last solid food consumption: 02/24/22    BMI:   Wt Readings from Last 3 Encounters:   02/25/22 213 lb 8.3 oz (96.9 kg)   02/15/22 219 lb 9.6 oz (99.6 kg)   02/05/22 210 lb (95.3 kg)     Body mass index is 32.46 kg/m². CBC:   Lab Results   Component Value Date    WBC 6.3 07/01/2021    RBC 4.88 07/01/2021    HGB 14.4 07/01/2021    HCT 41.9 07/01/2021    MCV 85.9 07/01/2021    RDW 12.9 07/01/2021     07/01/2021       CMP:   Lab Results   Component Value Date     07/01/2021    K 4.5 07/01/2021     07/01/2021    CO2 25 07/01/2021    BUN 16 07/01/2021    CREATININE 1.1 07/01/2021    GFRAA >60 07/01/2021    AGRATIO 1.6 07/01/2021    LABGLOM >60 07/01/2021    GLUCOSE 83 07/01/2021    PROT 7.6 07/01/2021    CALCIUM 9.4 07/01/2021    BILITOT 0.8 07/01/2021    ALKPHOS 29 07/01/2021    AST 53 07/01/2021    ALT 25 07/01/2021       POC Tests: No results for input(s): POCGLU, POCNA, POCK, POCCL, POCBUN, POCHEMO, POCHCT in the last 72 hours. Coags: No results found for: PROTIME, INR, APTT    HCG (If Applicable): No results found for: PREGTESTUR, PREGSERUM, HCG, HCGQUANT     ABGs: No results found for: PHART, PO2ART, FVO6ZTA, HMM4WUG, BEART, C3NQCJMD     Type & Screen (If Applicable):  No results found for: LABABO, LABRH    Drug/Infectious Status (If Applicable):  No results found for: HIV, HEPCAB    COVID-19 Screening (If Applicable):   Lab Results   Component Value Date    COVID19 DETECTED 12/28/2020           Anesthesia Evaluation  Patient summary reviewed no history of anesthetic complications:   Airway: Mallampati: II  TM distance: >3 FB   Neck ROM: full  Mouth opening: > = 3 FB Dental: normal exam         Pulmonary:   (+) sleep apnea: on CPAP,      (-) shortness of breath and not a current smoker          Patient did not smoke on day of surgery.                  Cardiovascular:Negative CV ROS        (-) pacemaker, past MI, CABG/stent and  angina       Beta Blocker:  Not on Beta Blocker Neuro/Psych:   Negative Neuro/Psych ROS     (-) seizures and CVA           GI/Hepatic/Renal:   (+) GERD:,      (-) liver disease and no renal disease       Endo/Other: Negative Endo/Other ROS       (-) diabetes mellitus, hypothyroidism, hyperthyroidism               Abdominal:             Vascular: negative vascular ROS. Other Findings:             Anesthesia Plan      general and regional     ASA 2     (Left adductor canal block)  Induction: intravenous. MIPS: Postoperative opioids intended and Prophylactic antiemetics administered. Anesthetic plan and risks discussed with patient. Plan discussed with CRNA. This pre-anesthesia assessment may be used as a history and physical.    DOS STAFF ADDENDUM:    Pt seen and examined, chart reviewed (including anesthesia, drug and allergy history). No interval changes to history and physical examination. Anesthetic plan, risks, benefits, alternatives, and personnel involved discussed with patient. Patient verbalized an understanding and agrees to proceed.       Dai Gallegos MD  February 25, 2022  9:44 AM

## 2022-02-25 NOTE — BRIEF OP NOTE
Brief Postoperative Note      Patient: Wing Zhu  YOB: 1990  MRN: [id number]    Date of Procedure: 2/25/2022    Pre-Op Diagnosis: LEFT KNEE ANTERIOR CRUCIATE LIGAMENT TEAR, LATERAL MENISCUS TEAR    Post-Op Diagnosis: Same       Procedure(s):  LEFT KNEE ARTHROSCOPY, ANTERIOR CRUCIATE LIGAMENT RECONSTRUCTION WITH QUADRICEPS TENDON ALLOGRAFT, PARTIAL LATERAL MENISCECTOMY     Surgeon(s):  Kassi Rolon MD    Assistant:  Surgical Assistant: Zee Hurt RN    Anesthesia: General    Estimated Blood Loss (mL): Minimal    Complications: None    Specimens:   * No specimens in log *    Implants:  Implant Name Type Inv.  Item Serial No.  Lot No. LRB No. Used Action   CONSTRUCT PRE-SUTURED QUADLINK 9-11MM 60-75MM - [id number]  CONSTRUCT PRE-SUTURED QUADLINK 9-11MM 60-75MM [id number] [facility] TISSUE [facility]-  Left 1 Implanted   BUTTON FIX [id number] TI ATTCH SYS ALLGRFT CONSTRUCT FOR - [id number]  BUTTON FIX [id number] TI ATTCH SYS ALLGRFT CONSTRUCT FOR  [employer]WD [id number] Left 1 Implanted   DEVICE GRFT FIX 4.75X19.1 MM BIOCOMPOSITE SWIVELOCK - [id number]  DEVICE GRFT FIX 4.75X19.1 MM BIOCOMPOSITE SWIVELOCK  [employer]WD [id number] Left 1 Implanted   ANCHOR SUT 4.85X65RX PEEK DBL LD Zain eParson - [id number]  ANCHOR SUT 4.33E79QS PEEK DBL LD Wes Moran PXL- [id number] Left 1 Implanted         Drains: * No LDAs found *          Electronically signed by Kassi Rolon MD on 2/25/2022 at 12:18 PM

## 2022-02-25 NOTE — PROGRESS NOTES
CLINICAL PHARMACY NOTE: MEDS TO BEDS    Total # of Prescriptions Filled: 2   The following medications were delivered to the patient:  Current Discharge Medication List      START taking these medications    Details   oxyCODONE-acetaminophen (PERCOCET) 5-325 MG per tablet Take 1 tablet by mouth every 4 hours as needed for Pain for up to 7 days.   Qty: 40 tablet, Refills: 0    Comments: Reduce doses taken as pain becomes manageable  Associated Diagnoses: Rupture of anterior cruciate ligament of left knee, initial encounter      promethazine (PHENERGAN) 25 MG tablet Take 1 tablet by mouth every 6 hours as needed for Nausea  Qty: 5 tablet, Refills: 0         ·   ·     Additional Documentation:

## 2022-02-26 NOTE — OP NOTE
[facility]           [address]                                OPERATIVE REPORT    PATIENT NAME: Tameka Navarro                      :        1990  MED REC NO:   [id number]                          ROOM:  ACCOUNT NO:   [de-identified]                           ADMIT DATE: 2022  PROVIDER:     Pilar Cabello MD      DATE OF PROCEDURE:  2022    PREOPERATIVE DIAGNOSES:  Left knee anterior cruciate ligament tear and  lateral meniscus tear. POSTOPERATIVE DIAGNOSES:  Left knee anterior cruciate ligament tear and  lateral meniscus tear. OPERATION PERFORMED:  Left knee arthroscopy, anterior cruciate ligament  reconstruction with quadriceps tendon allograft and partial lateral  meniscectomy. SURGEON:  Pilar Cabello MD    ASSISTANTS:  Nya Estrella and Patrick Villanueva. ANESTHESIA:  General.    EBL:  Minimal.    COMPLICATIONS:  None. DISPOSITION:  To PACU in good condition. INDICATIONS:  The patient is a 76-year-old gentleman who was seen in the  office for left knee injury. He sustained an injury while wrestling and  felt a pop in his knee. He did have a prior history of left knee ACL  reconstruction approximately 10 years ago with an Achilles allograft. Given his age and activity level, we recommended ACL reconstruction. We  discussed the risks, benefits, complications and alternatives of the  procedure. He subsequently provided written informed consent for the  procedure. OPERATIVE PROCEDURE:  The patient was seen in the preoperative holding  area. His left knee was marked. He was seen by anesthesia service. He  received a preoperative nerve block. He was then brought to the  operating room. He was transferred onto the operating room table in  supine position. He was induced under general anesthesia. He received  prophylactic preoperative IV antibiotics.   He had DVT prophylaxis with  sequential compression devices on his nonoperative lower extremity. A  well-padded tourniquet was placed along his left proximal thigh. His  left leg was then sterilely prepped and draped in the usual fashion. A  time-out was taken where the patient, the operative extremity, and the  operative procedure were once again verified. We then exsanguinated the leg with Esmarch bandage. Tourniquet was  inflated to 300 mmHg. We then insufflated the knee with 60 mL of normal  saline. A standard anterolateral portal was created. A superior medial  outflow portal was also created. We then performed diagnostic  arthroscopy, which demonstrated a likely remnant of medial plica that  appeared to have scarred over. This did come close to his patella as  well as his medial femoral condyle. 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 He had some slight  chondromalacia of the lateral facet of the patella also. There were no  other abnormalities in the medial or lateral gutters. The scope was then brought down into the intercondylar notch. An  anteromedial portal was then created under direct visualization using a  spinal needle. We could see the ruptured ACL graft from before. His  PCL was intact. We then explored the medial compartment, which  demonstrated no chondromalacia, and no medial meniscus tear. We  explored the lateral compartment, which demonstrated no significant  chondromalacia. He did have some fraying of the superior surface of the  root of the lateral meniscus. However, there was no overt tear. We did  perform partial lateral meniscectomy shaving the frayed and torn part of  the superior surface of the lateral meniscus. The scope was brought back into the suprapatellar pouch. Chondroplasty  was performed of the patella. We also resected the medial plica. We  then brought the scope back down into the intercondylar notch.   The  remnants of his ACL were sutures also. The wounds were then copiously irrigated with normal saline solution. The tibial incision was closed subcutaneously with 2-0 Vicryl suture in  simple inverted, interrupted fashion and then the skin was closed with  4-0 Monocryl in a running subcuticular fashion. The portal sites were  closed with 2-0 Prolene in simple interrupted fashion. Steri-Strips  were applied. Betadine ointment, 2x2 gauze, and Tegaderm dressing were  then applied as well as sterile Webril and hinged knee brace. The patient was then awoken from anesthesia, transferred onto his  hospital cart and transported to PACU for recovery. He tolerated the  procedure well and without any complications. PLAN:  The patient will be recovered in the PACU, then be discharged  home. He is touchdown weightbearing for the next two weeks. He was  given a prescription for Percocet as well as Phenergan for nausea and  vomiting. He will follow up in the office in the next three to four  days for wound check as well as review of his arthroscopic pictures with  him and start him on physical therapy.         Jhonatan Sharif MD    D: 02/25/2022 12:52:30       T: 02/25/2022 20:54:38     NASIM/MALLORIE_LILY_VALERIA  Job#: [id number]     Doc#: [id number]    CC:

## 2022-02-28 ENCOUNTER — OFFICE VISIT (OUTPATIENT)
Dept: ORTHOPEDIC SURGERY | Age: 32
End: 2022-02-28

## 2022-02-28 VITALS — BODY MASS INDEX: 32.28 KG/M2 | WEIGHT: 213 LBS | HEIGHT: 68 IN

## 2022-02-28 DIAGNOSIS — S83.282A TEAR OF LATERAL MENISCUS OF LEFT KNEE, CURRENT, UNSPECIFIED TEAR TYPE, INITIAL ENCOUNTER: ICD-10-CM

## 2022-02-28 DIAGNOSIS — S83.512D RUPTURE OF ANTERIOR CRUCIATE LIGAMENT OF LEFT KNEE, SUBSEQUENT ENCOUNTER: Primary | ICD-10-CM

## 2022-02-28 PROCEDURE — 1040RET RETAIN PT ID & RECRUITMENT: Performed by: STUDENT IN AN ORGANIZED HEALTH CARE EDUCATION/TRAINING PROGRAM

## 2022-02-28 PROCEDURE — 99024 POSTOP FOLLOW-UP VISIT: CPT | Performed by: STUDENT IN AN ORGANIZED HEALTH CARE EDUCATION/TRAINING PROGRAM

## 2022-02-28 NOTE — PROGRESS NOTES
Knee Arthroscopy Follow-up  Guillermina Shaffer is here for follow up after left knee arthroscopic surgery. Surgery date was 2/26/22. Findings at surgery: Left knee anterior cruciate ligament tear and lateral meniscus tear. Pain is controlled with current analgesics. Medication(s) being used: acetaminophen. The patient's pain is rated at 2/10. The patient denies fever, wound drainage, increasing redness, pus, increasing swelling. Post op problems reported: none. He is ambulating with crutches with touch-down weight-bearing as instructed. Physical Examination:  Patient is awake, alert, and in no acute distress. The incisions are clean, dry, no drainage. There is no warmth, erythema, or purulent drainage over the incisions. Assessment:   2 days status post left knee arthroscopy, anterior cruciate ligament reconstruction with quadriceps tendon allograft and partial lateral meniscectomy. Plan:   Start physical therapy. A physical therapy order was placed and postoperative physical therapy protocol was provided to the patient to give to the physical therapist.     The patient may not advance their weight-bearing. Touchdown for 2 weeks. T-Scope brace until patient has normal gait and adequate quad strength. Brace can be discontinued by physical therapist when patient achieves that. The patient should use the cold pad or apply ice to the knee continuously for 3 days after surgery. Then use cold pad or ice 20-30 minutes only, 3-5 times per day as needed. Refill pain medications as needed. No NSAIDs. No driving while on pain medications if it causes impairment. No driving until able to move leg to use gas / brake. Patient may start showering now. Cover with plastic bag for 3 days. No baths or soaks. Can leave open to air or apply band-aids to the incisions when out of Tegaderm.          Return to office at the 2 week postop time period for suture removal and evaluation of progress. Naif Ashton PA-C  Board Certified by the [employer] on Certification of [address] [facility]         This note was generated with use of a verbal recognition program [facility]) and was checked for errors. It is possible that there are still dictated errors within this office note. If so, please bring any errors to my attention for an addendum. All efforts were made to ensure that this office note is accurate.

## 2022-02-28 NOTE — PROGRESS NOTES
RETAIN NEW PATIENT NOTE    Completed today:   [x]  Initial RETAIN consultation. [x]  Activity Prescription-if any restrictions for work. [x]  Contact Employer (by phone or email) regarding available accommodations and/or return to work options as required. [] Discuss plan of care with coordinator by phone or [facility] in-basket message. [x] Established patient's return to work goal.   Patient education regarding the importance of:   [] staying active to avoid deconditioning. [x] returning to work as soon as possible. Work is good for [employer]. Long periods of absence from work are harmful. [x] the patient's currently recommended abilities, activity modifications and workplace modifications. [x] pain, although a part of healing from an injury, does not necessarily mean you are causing harm. [x] patient's roles and responsibilities including close follow up and compliance with recommendations. [x] Schedule a follow-up in two weeks.

## 2022-03-03 ENCOUNTER — PATIENT MESSAGE (OUTPATIENT)
Dept: PULMONOLOGY | Age: 32
End: 2022-03-03

## 2022-03-03 ENCOUNTER — HOSPITAL ENCOUNTER (OUTPATIENT)
Dept: PHYSICAL THERAPY | Age: 32
Setting detail: THERAPIES SERIES
Discharge: HOME | End: 2022-03-03
Payer: COMMERCIAL

## 2022-03-03 PROCEDURE — 97016 VASOPNEUMATIC DEVICE THERAPY: CPT

## 2022-03-03 PROCEDURE — 97161 PT EVAL LOW COMPLEX 20 MIN: CPT

## 2022-03-03 PROCEDURE — 97112 NEUROMUSCULAR REEDUCATION: CPT

## 2022-03-03 NOTE — TELEPHONE ENCOUNTER
I called the patient and discussed scheduling options w/him. Canceled 3/16 appt and r/s him with Otilia Duncan tomorrow @ . Gave address and advised him to bring his machine.

## 2022-03-03 NOTE — TELEPHONE ENCOUNTER
From: Gale Duggan  To: Dr. Fletcher Alert: 3/3/2022 11:57 AM EST  Subject: Reschedule Appointment    Hello,  I recently started a new job but also had knee surgery so my schedule has changed and I can't make this appointment. Are there any options on Fridays coming up?  I have more flexibility there     Thanks,  Cooper Bear

## 2022-03-03 NOTE — FLOWSHEET NOTE
[employer]    Physical Therapy Treatment Note/ Progress Report:     Date:  3/3/2022    Patient Name:  Torin Devi    :  1990  MRN: [id number]  Medical/Treatment Diagnosis Information:  · Diagnosis: S83.512D (ICD-10-CM) - Rupture of anterior cruciate ligament of left knee, subsequent [id number] (ICD-10-CM) - Tear of lateral meniscus of left knee, current, unspecified tear type, initial encounter  · Treatment Diagnosis: s/p ACLR w/ quad tendon allograft and partial lateral meniscectomy  Insurance/Certification information:  PT Insurance Information: Switched jobs - waiting for new ins  Physician Information:  Referring Practitioner: Dr. Liane Pineda of care signed (Y/N):     Date of Patient follow up with Physician: 3/11/22     Progress Report: []  Yes  [x]  No     Functional Scale: LEFS = 67% disability Date: 3/3/22    Date Range for reporting period:  Beginning:  3/3/22  Endin/3/22    Progress report due (10 Rx/or 30 days whichever is less): 04     Recertification due (POC duration/ or 90 days whichever is less): 5/3/22     Visit # Insurance Allowable Auth Needed   1 Switched job - waiting for new ins []Yes    []No     Pain level:  1-2/10 currently, 5-6/10 worst     SUBJECTIVE:  See eval    OBJECTIVE: See eval   Observation:    Test measurements:      RESTRICTIONS/PRECAUTIONS: s/p L ACLR w/ quad tendon allograft and partial lateral meniscectomy (22) - pt is TTWB x 2 weeks, hx of L ACLR (10 years ago)    Exercises/Interventions:     Therapeutic Ex 3' Resistance Sets/sec Reps Notes   Heel prop 1'      Quad sets  1 10 1 set w/o NMES; also performed w/ NMES   Supine heel slide with strap  1 10                                                                     Therapeutic Activities 3'       Pt education 3'   TTWB and gait mechanics, protocol, expectations                                                     Manual Intervention       Knee mobs/PROM       Tib/Fem Mobs       Patella Mobs       Ankle mobs                     NMR re-education 8'       Citizen of [place]/NMES 8'   W/ QS                                                               Therapeutic Exercise and NMR EXR  [] (93342) Provided verbal/tactile cueing for activities related to strengthening, flexibility, endurance, ROM for improvements in LE, proximal hip, and core control with self care, mobility, lifting, ambulation. [x] (29897) Provided verbal/tactile cueing for activities related to improving balance, coordination, kinesthetic sense, posture, motor skill, proprioception  to assist with LE, proximal hip, and core control in self care, mobility, lifting, ambulation and eccentric single leg control.      NMR and Therapeutic Activities:    [x] (01533 or 19539) Provided verbal/tactile cueing for activities related to improving balance, coordination, kinesthetic sense, posture, motor skill, proprioception and motor activation to allow for proper function of core, proximal hip and LE with self care and ADLs  [] (48295) Gait Re-education- Provided training and instruction to the patient for proper LE, core and proximal hip recruitment and positioning and eccentric body weight control with ambulation re-education including up and down stairs     Home Exercise Program:    [x] (76436) Reviewed/Progressed HEP activities related to strengthening, flexibility, endurance, ROM of core, proximal hip and LE for functional self-care, mobility, lifting and ambulation/stair navigation   [] (57145)Reviewed/Progressed HEP activities related to improving balance, coordination, kinesthetic sense, posture, motor skill, proprioception of core, proximal hip and LE for self care, mobility, lifting, and ambulation/stair navigation      Manual Treatments:  PROM / STM / Oscillations-Mobs:  G-I, II, III, IV (PA's, Inf., Post.)  [] (27989) Provided manual therapy to mobilize LE, proximal hip and/or LS strength and control, within 5lb HHD in LE to allow for proper functional mobility as indicated by patients Functional Deficits. []? Progressing: []? Met: []? Not Met: []? Adjusted  4. Patient will return to 15+ minutes of ambulation without increased symptoms or restriction to return to PLOF. []? Progressing: []? Met: []? Not Met: []? Adjusted  5. Patient will tolerate 25+ minutes of sitting without increased symptoms or restriction to return to PLOF. []? Progressing: []? Met: []? Not Met: []? Adjusted       Progression Towards Functional goals:  [] Patient is progressing as expected towards functional goals listed. [] Progression is slowed due to complexities listed. [] Progression has been slowed due to co-morbidities. [x] Plan just implemented, too soon to assess goals progression  [] Other:     ASSESSMENT:  See eval    Return to Play: (if applicable)   []  Stage 1: Intro to Strength   []  Stage 2: Return to Run and Strength   []  Stage 3: Return to Jump and Strength   []  Stage 4: Dynamic Strength and Agility   []  Stage 5: Sport Specific Training     []  Ready to Return to Play, Meets All Above Stages   []  Not Ready for Return to Sports   Comments:            Treatment/Activity Tolerance:  [x] Patient tolerated treatment well [] Patient limited by fatique  [] Patient limited by pain  [] Patient limited by other medical complications  [] Other:     Overall Progression Towards Functional goals/ Treatment Progress Update:  [] Patient is progressing as expected towards functional goals listed. [] Progression is slowed due to complexities/Impairments listed. [] Progression has been slowed due to co-morbidities.   [x] Plan just implemented, too soon to assess goals progression <30days   [] Goals require adjustment due to lack of progress  [] Patient is not progressing as expected and requires additional follow up with physician  [] Other    Prognosis for POC: [x] Good [] Fair  [] Poor    Patient requires continued skilled intervention: [x] Yes  [] No        PLAN: Decrease pain, increase knee ROM, improve quad contraction, and increase strength per protocol. [] Continue per plan of care [] Alter current plan (see comments)  [x] Plan of care initiated [] Hold pending MD visit [] Discharge    Electronically signed by: Osvaldo Grady, PT   Therapist was present, directed the patient's care, made skilled judgement, and was responsible for assessment and treatment of the patient. Samantha Caba, SPT      Note: If patient does not return for scheduled/recommended follow up visits, this note will serve as a discharge from care along with the most recent update on progress.

## 2022-03-04 ENCOUNTER — OFFICE VISIT (OUTPATIENT)
Dept: PULMONOLOGY | Age: 32
End: 2022-03-04
Payer: COMMERCIAL

## 2022-03-04 VITALS
BODY MASS INDEX: 31.83 KG/M2 | SYSTOLIC BLOOD PRESSURE: 140 MMHG | HEART RATE: 93 BPM | HEIGHT: 68 IN | OXYGEN SATURATION: 97 % | WEIGHT: 210 LBS | DIASTOLIC BLOOD PRESSURE: 82 MMHG

## 2022-03-04 DIAGNOSIS — J30.9 CHRONIC ALLERGIC RHINITIS: Chronic | ICD-10-CM

## 2022-03-04 DIAGNOSIS — E66.09 CLASS 1 OBESITY DUE TO EXCESS CALORIES WITH SERIOUS COMORBIDITY AND BODY MASS INDEX (BMI) OF 32.0 TO 32.9 IN ADULT: Chronic | ICD-10-CM

## 2022-03-04 DIAGNOSIS — G47.33 OSA (OBSTRUCTIVE SLEEP APNEA): Primary | Chronic | ICD-10-CM

## 2022-03-04 DIAGNOSIS — K21.9 GERD WITHOUT ESOPHAGITIS: Chronic | ICD-10-CM

## 2022-03-04 PROCEDURE — 99441 PR PHYS/QHP TELEPHONE EVALUATION 5-10 MIN: CPT | Performed by: NURSE PRACTITIONER

## 2022-03-04 ASSESSMENT — SLEEP AND FATIGUE QUESTIONNAIRES
HOW LIKELY ARE YOU TO NOD OFF OR FALL ASLEEP IN A CAR, WHILE STOPPED FOR A FEW MINUTES IN TRAFFIC: 0
HOW LIKELY ARE YOU TO NOD OFF OR FALL ASLEEP WHEN YOU ARE A PASSENGER IN A CAR FOR AN HOUR WITHOUT A BREAK: 0
HOW LIKELY ARE YOU TO NOD OFF OR FALL ASLEEP WHILE SITTING QUIETLY AFTER LUNCH WITHOUT ALCOHOL: 1
ESS TOTAL SCORE: 5
HOW LIKELY ARE YOU TO NOD OFF OR FALL ASLEEP WHILE LYING DOWN TO REST IN THE AFTERNOON WHEN CIRCUMSTANCES PERMIT: 2
HOW LIKELY ARE YOU TO NOD OFF OR FALL ASLEEP WHILE SITTING INACTIVE IN A PUBLIC PLACE: 0
HOW LIKELY ARE YOU TO NOD OFF OR FALL ASLEEP WHILE SITTING AND TALKING TO SOMEONE: 0
HOW LIKELY ARE YOU TO NOD OFF OR FALL ASLEEP WHILE SITTING AND READING: 1
HOW LIKELY ARE YOU TO NOD OFF OR FALL ASLEEP WHILE WATCHING TV: 1

## 2022-03-04 NOTE — PROGRESS NOTES
Diagnosis: [x] JULIAN (G47.33) [] CSA (G47.31) [] Apnea (G47.30)   Length of Need: [x] 15 Months [] 99 Months [] Other:   Machine (ZENAIDA!): [] Respironics Dream Station      Auto [] ResMed AirSense     Auto [] Other:     []  CPAP () [] Bilevel ()   Mode: [] Auto [] Spontaneous    Mode: [] Auto [] Spontaneous             Comfort Settings:      Humidifier: [] Heated ()        [x] Water chamber replacement ()/ 1 per 6 months        Mask:   [] Nasal () /1 per 3 months [x] Full Face () /1 per 3 months   [] Patient choice -Size and fit mask [x] Patient Choice - Size and fit mask   [] Dispense: [] Dispense:   [] Headgear () / 1 per 3 months [x] Headgear () / 1 per 3 months   [] Replacement Nasal Cushion ()/2 per month [x] Interface Replacement ()/1 per month   [] Replacement Nasal Pillows ()/2 per month         Tubing: [x] Heated ()/1 per 3 months    [] Standard ()/1 per 3 months [] Other:           Filters: [x] Non-disposable ()/1 per 6 months     [x] Ultra-Fine, Disposable ()/2 per month        Miscellaneous: [] Chin Strap ()/ 1 per 6 months [] O2 bleed-in:        LPM   [] Oxymetry on CPAP/Bilevel []  Other:         Start Order Date: 03/04/22    MEDICAL JUSTIFICATION:  I, the undersigned, certify that the above prescribed supplies are medically necessary for this patients wellbeing. In my opinion, the supplies are both reasonable and necessary in reference to accepted standards of medicalpractice in treatment of this patients condition. Bailey Jorgensen NP    NPI: [id number]       Order Signed Date: 03/04/22  [address] [place] [address]  Pulmonary, Sleep, and Critical Care    Pulmonary, Sleep, and Critical Care  [address] [place] [address].  Suite [facility], [address] [place] [address] , [address] [facility]                                    Carleen Guallpa [facility]  Phone: [phone]    Fax: [address] [facility]  1990  [address] [place] [address]  [phone] (home)   [phone] (mobile)      Insurance Info (confirm with patient if correct):  Payor/Plan Subscr  Sex Relation Sub.  Ins. ID Effective Group Num

## 2022-03-04 NOTE — PROGRESS NOTES
Diagnosis: [x] JULIAN (G47.33) [] CSA (G47.31) [] Apnea (G47.30)   Length of Need: [x] 15 Months [] 99 Months [] Other:   Machine (ZENAIDA!): [] Respironics Dream Station      Auto [] ResMed AirSense     Auto [] Other:     []  CPAP () [] Bilevel ()   Mode: [] Auto [] Spontaneous    Mode: [] Auto [] Spontaneous            Comfort Settings:      Humidifier: [] Heated ()        [x] Water chamber replacement ()/ 1 per 6 months        Mask:   [x] Nasal () /1 per 3 months [] Full Face () /1 per 3 months   [x] Patient choice -Size and fit mask [] Patient Choice - Size and fit mask   [] Dispense: [] Dispense:   [x] Headgear () / 1 per 3 months [] Headgear () / 1 per 3 months   [x] Replacement Nasal Cushion ()/2 per month [] Interface Replacement ()/1 per month   [] Replacement Nasal Pillows ()/2 per month         Tubing: [x] Heated ()/1 per 3 months    [] Standard ()/1 per 3 months [] Other:           Filters: [x] Non-disposable ()/1 per 6 months     [x] Ultra-Fine, Disposable ()/2 per month        Miscellaneous: [] Chin Strap ()/ 1 per 6 months [] O2 bleed-in:        LPM   [] Oxymetry on CPAP/Bilevel []  Other:         Start Order Date: 03/04/22    MEDICAL JUSTIFICATION:  I, the undersigned, certify that the above prescribed supplies are medically necessary for this patients wellbeing. In my opinion, the supplies are both reasonable and necessary in reference to accepted standards of medicalpractice in treatment of this patients condition. Dieter Maddox NP    NPI: [id number]       Order Signed Date: 03/04/22  [address] [facility]  Pulmonary, Sleep, and Critical Care    Pulmonary, Sleep, and Critical Care  [address] [place] [address].  [facility], [address] [facility] , [address] [facility]                                    [facility]  Phone: [phone]    Fax: [address] [facility] Kenroy Olsen  1990  [place] [address]  [phone] (home)   [phone] (mobile)      Insurance Info (confirm with patient if correct):  Payor/Plan Subscr  Sex Relation Sub.  Ins. ID Effective Group Num

## 2022-03-04 NOTE — ASSESSMENT & PLAN NOTE
Chronic-Stable: Reviewed and analyzed results of physiologic download from patient's machine and reviewed with patient. Supplies and parts as needed for his machine. These are medically necessary. Limit caffeine use after 3pm. Based on the analyzed data will continue with current settings. Stable on his machine at current settings, getting benefit from the use, and having minimal side effects. He continues to do well with his machine. He is complaint and getting good symptom control. He is encouraged to keep up with his machine. Will see him back in a 6 month f/u for DOT/FAA compliance check (he understands that he he needs to be seen every 6 months) unless there are issues, then he is to call for an earlier appointment. Discussed consistent use of his machine each night, all night. No driving or flying when sleepy. We will see him back in 6 months or sooner if issues arise.

## 2022-03-04 NOTE — LETTER
[facility] Sleep Medicine  [address] [facility]  [place] [address] [address]  Phone: [phone]  Fax: [phone]    March 4, 2022       Patient: Calli Morelos   MR Number: [id number]   YOB: 1990   Date of Visit: 3/4/2022       Joyce Nesbitt was seen for a follow up visit today. Here is my assessment and plan as well as an attached copy of his visit today:    GERD without esophagitis  Chronic- Stable. Discussed the importance of treating obstructive sleep apnea as part of the management of this disorder. Cont any meds per PCP and other physicians. Class 1 obesity due to excess calories with serious comorbidity and body mass index (BMI) of 32.0 to 32.9 in adult  Chronic-not stable:  Discussed importance of treating obstructive sleep apnea and getting sufficient sleep to assist with weight control. Encouraged him to work on weight loss through diet and exercise. Recommended DASH or Mediterranean diets. Chronic allergic rhinitis  Chronic- Stable. Discussed the importance of treating obstructive sleep apnea as part of the management of this disorder. Cont any meds per PCP and other physicians. JULIAN (obstructive sleep apnea)   Chronic-Stable: Reviewed and analyzed results of physiologic download from patient's machine and reviewed with patient. Supplies and parts as needed for his machine. These are medically necessary. Limit caffeine use after 3pm. Based on the analyzed data will continue with current settings. Stable on his machine at current settings, getting benefit from the use, and having minimal side effects. Will see him back in a 6 month f/u for DOT/FAA compliance check (he understands that he he needs to be seen every 6 months) unless there are issues, then he is to call for an earlier appointment. Discussed consistent use of his machine each night, all night. No driving or flying when sleepy.   We will see him back in 6 months or sooner if issues arise.          If you have questions or concerns, please do not hesitate to call me. I look forward to following Azar Robles along with you.     Sincerely,    MARILEE Henry     providers:  Davidson Chavez [id number] [employer]

## 2022-03-04 NOTE — PROGRESS NOTES
Paige Benson MD, [facility], [facility]  Jeannette [address]  [address] [facility], [facility] Saúl Osorio E [phone]   [facility] [address] Poonam Hoskins. [address] [place] [address]. Sissy Fernández [address] [phone]     [address] [id number] [phone]      Assessment/Plan:      1. JULIAN (obstructive sleep apnea)  Assessment & Plan:   Chronic-Stable: Reviewed and analyzed results of physiologic download from patient's machine and reviewed with patient. Supplies and parts as needed for his machine. These are medically necessary. Limit caffeine use after 3pm. Based on the analyzed data will continue with current settings. Stable on his machine at current settings, getting benefit from the use, and having minimal side effects. He continues to do well with his machine. He is complaint and getting good symptom control. He is encouraged to keep up with his machine. Will see him back in a 6 month f/u for DOT/FAA compliance check (he understands that he he needs to be seen every 6 months) unless there are issues, then he is to call for an earlier appointment. Discussed consistent use of his machine each night, all night. No driving or flying when sleepy. We will see him back in 6 months or sooner if issues arise. 2. GERD without esophagitis  Assessment & Plan:  Chronic- Stable. Discussed the importance of treating obstructive sleep apnea as part of the management of this disorder. Cont any meds per PCP and other physicians. 3. Class 1 obesity due to excess calories with serious comorbidity and body mass index (BMI) of 32.0 to 32.9 in adult  Assessment & Plan:  Chronic-not stable:  Discussed importance of treating obstructive sleep apnea and getting sufficient sleep to assist with weight control. Encouraged him to work on weight loss through diet and exercise.  Recommended DASH or Mediterranean diets.    4. Chronic allergic rhinitis  Assessment & Plan:  Chronic- Stable. Discussed the importance of treating obstructive sleep apnea as part of the management of this disorder. Cont any meds per PCP and other physicians. Reviewed, analyzed, and documented physiologic data from patient's PAP machine. This information was analyzed to assess complexity and medical decision making in regards to further testing and management. The primary encounter diagnosis was JULIAN (obstructive sleep apnea). Diagnoses of GERD without esophagitis, Class 1 obesity due to excess calories with serious comorbidity and body mass index (BMI) of 32.0 to 32.9 in adult, and Chronic allergic rhinitis were also pertinent to this visit. The chronic medical conditions listed are directly related to the primary diagnosis listed above. The management of the primary diagnosis affects the secondary diagnosis and vice versa. Attending Supervising Physicians Attestation Statement  The patient met the criteria for indirect supervision. I discussed the findings and plans with the nurse practitioner and agree as documented. Patient notified of [address]  rules and patient needs to be using his machine at least 6 hrs a night on average. Electronically signed by Tracie Morales MD on 3/4/22 at 2:44 PM EST           Subjective:   Subjective   Patient ID: Leda Head is a 33 y.o. male. Chief Complaint   Patient presents with    Sleep Apnea       HPI:  Machine Modem/Download Info:  Compliance (hours/night): 6 hrs/night  % of nights >= 4 hrs: 82.2 %  Download AHI (/hour): 1.3 /HR  Average CPAP Pressure : 10.2 cmH2O      APAP - Settings  Pressure Min: 9 cmH2O  Pressure Max: 17 cmH2O                 Comfort Settings  Flex/EPR (0-3): 3 PAP Mask  Mask Type: Nasal mask     Leda Head reports he is doing well with his machine.   He recently had an ACL repaired and has been experiencing some pain and has been taking Percocet at night which seems to help. Pressure on his machine feels good and he is waking rested. Sometimes has trouble with nasal congestion and will switch to a full facemask. He will also adjust the moisture settings on his machine accordingly. he denies headaches, congestion, nosebleeds, dryness, aerophagia, or drowsiness while driving. He has received his replacement machine for the  recall. [address] license: Next physical 2023. D[employer] Company - [employer]    [place] - Total score: 5    Social History     Socioeconomic History    Marital status: Single     Spouse name: Not on file    Number of children: Not on file    Years of education: Not on file    Highest education level: Not on file   Occupational History    Not on file   Tobacco Use    Smoking status: Never Smoker    Smokeless tobacco: Never Used   Vaping Use    Vaping Use: Never used   Substance and Sexual Activity    Alcohol use: Yes     Alcohol/week: 2.0 standard drinks     Types: 2 Cans of beer per week     Comment: occassionally    Drug use: Never    Sexual activity: Never   Other Topics Concern    Not on file   Social History Narrative    Not on file     Social Determinants of Health     Financial Resource Strain: Low Risk     Difficulty of Paying Living Expenses: Not hard at all   Food Insecurity: No Food Insecurity    Worried About [facility] in the Last Year: Never true    [address] [facility] [address] in the Last Year: Never true   Transportation Needs:     Lack of Transportation (Medical): Not on file    Lack of Transportation (Non-Medical):  Not on file   Physical Activity:     Days of Exercise per Week: Not on file    Minutes of Exercise per Session: Not on file   Stress:     Feeling of Stress : Not on file   Social Connections:     Frequency of Communication with Friends and Family: Not on file    Frequency of Social Gatherings with Friends and Family: Not on file    Attends Yarsanism Services: Not on file   [employer] or Organizations: Not on file    Attends Club or Organization Meetings: Not on file    Marital Status: Not on file   Intimate Partner Violence:     Fear of Current or Ex-Partner: Not on file    Emotionally Abused: Not on file    Physically Abused: Not on file    Sexually Abused: Not on file   Housing Stability:     Unable to Pay for Housing in the Last Year: Not on file    Number of [place] in the Last Year: Not on file    Unstable Housing in the Last Year: Not on file       Current Outpatient Medications   Medication Instructions    econazole nitrate 1 % cream Apply topically daily.  famotidine (PEPCID) 20 mg, Oral, 2 TIMES DAILY    Fexofenadine HCl (ALLEGRA PO) Oral, DAILY PRN    fluticasone (FLONASE) 50 MCG/ACT nasal spray 2 sprays, Each Nostril, EVERY EVENING    oxyCODONE-acetaminophen (PERCOCET) 5-325 MG per tablet 1 tablet, Oral, EVERY 4 HOURS PRN    promethazine (PHENERGAN) 25 mg, Oral, EVERY 6 HOURS PRN    tacrolimus (PROTOPIC) 0.1 % ointment Apply twice daily as needed for flared areas. Vitals:  Weight BMI   Wt Readings from Last 3 Encounters:   03/04/22 210 lb (95.3 kg)   02/28/22 213 lb (96.6 kg)   02/25/22 213 lb 8.3 oz (96.9 kg)    Body mass index is 31.93 kg/m².      BP HR SaO2   BP Readings from Last 3 Encounters:   03/04/22 (!) 140/82   02/25/22 (!) 118/56   02/25/22 128/74    Pulse Readings from Last 3 Encounters:   03/04/22 93   02/25/22 88   09/15/21 66    SpO2 Readings from Last 3 Encounters:   03/04/22 97%   02/25/22 97%   02/25/22 96%        Electronically signed by Aly Chacon MD on 3/4/2022 at 2:45 PM

## 2022-03-08 ENCOUNTER — HOSPITAL ENCOUNTER (OUTPATIENT)
Dept: PHYSICAL THERAPY | Age: 32
Setting detail: THERAPIES SERIES
Discharge: HOME | End: 2022-03-08
Payer: COMMERCIAL

## 2022-03-08 PROCEDURE — 97110 THERAPEUTIC EXERCISES: CPT

## 2022-03-08 PROCEDURE — 97112 NEUROMUSCULAR REEDUCATION: CPT

## 2022-03-08 NOTE — FLOWSHEET NOTE
[facility]    Physical Therapy Treatment Note/ Progress Report:     Date:  3/8/2022    Patient Name:  Yao Reyna    :  1990  MRN: [id number]  Medical/Treatment Diagnosis Information:  · Diagnosis: S83.512D (ICD-10-CM) - Rupture of anterior cruciate ligament of left knee, subsequent [id number] (ICD-10-CM) - Tear of lateral meniscus of left knee, current, unspecified tear type, initial encounter  · Treatment Diagnosis: s/p ACLR w/ quad tendon allograft and partial lateral meniscectomy  Insurance/Certification information:  PT Insurance Information: Switched jobs - waiting for new ins  Physician Information:  Referring Practitioner: Dr. Carmel Cochran of care signed (Y/N):     Date of Patient follow up with Physician: 3/11/22     Progress Report: []  Yes  [x]  No     Functional Scale: LEFS = 67% disability Date: 3/3/22    Date Range for reporting period:  Beginning:  3/3/22  Endin/3/22    Progress report due (10 Rx/or 30 days whichever is less): 0/3/08     Recertification due (POC duration/ or 90 days whichever is less): 5/3/22     Visit # Insurance Allowable Auth Needed   2 Switched job - waiting for new ins []Yes    []No     Pain level:  1-2/10 currently, 4-5/10 worst     SUBJECTIVE: Pt states that he has been a little uncomfortable at times, but doing well. He reports that he's trying to work on TTWB. 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 He states that he was able to ice and elevate those evenings and  which helped a lot.     OBJECTIVE: s/p 1.5 weeks   Observation:    Test measurements:      RESTRICTIONS/PRECAUTIONS: s/p L ACLR w/ quad tendon allograft and partial lateral meniscectomy (22) - pt is TTWB x 2 weeks, hx of L ACLR (10 years ago)    Exercises/Interventions:     Therapeutic Ex 28' Resistance Sets/sec Reps Notes   Quad sets  2 10 1 set w/o NMES; also performed w/ NMES   Supine heel slide with strap  2 10    Long sitting PF Green 1 15    TKE red 2 10 NWB   S/L SLR    In brace; NPV   Prone HS curl AAROM Strap  2 10                                         Therapeutic Activities 2'       Pt education 2'   TTWB                                                     Manual Intervention 7'       Knee mobs/PROM    Ext mobs NPV? Tib/Fem Mobs       Patella Mobs 7'   Inf, sup, M/L   Ankle mobs                     NMR re-education 10'       [place]/NMES 10'   W/ QS                                                               Therapeutic Exercise and NMR EXR  [] (51821) Provided verbal/tactile cueing for activities related to strengthening, flexibility, endurance, ROM for improvements in LE, proximal hip, and core control with self care, mobility, lifting, ambulation. [x] (07109) Provided verbal/tactile cueing for activities related to improving balance, coordination, kinesthetic sense, posture, motor skill, proprioception  to assist with LE, proximal hip, and core control in self care, mobility, lifting, ambulation and eccentric single leg control.      NMR and Therapeutic Activities:    [x] (13651 or 20132) Provided verbal/tactile cueing for activities related to improving balance, coordination, kinesthetic sense, posture, motor skill, proprioception and motor activation to allow for proper function of core, proximal hip and LE with self care and ADLs  [] (57371) Gait Re-education- Provided training and instruction to the patient for proper LE, core and proximal hip recruitment and positioning and eccentric body weight control with ambulation re-education including up and down stairs     Home Exercise Program:    [x] (26892) Reviewed/Progressed HEP activities related to strengthening, flexibility, endurance, ROM of core, proximal hip and LE for functional self-care, mobility, lifting and ambulation/stair navigation   [] (96966)Reviewed/Progressed HEP activities related to improving balance, coordination, kinesthetic sense, posture, motor skill, proprioception of core, proximal hip and LE for self care, mobility, lifting, and ambulation/stair navigation      Manual Treatments:  PROM / STM / Oscillations-Mobs:  G-I, II, III, IV (PA's, Inf., Post.)  [] (69271) Provided manual therapy to mobilize LE, proximal hip and/or LS spine soft tissue/joints for the purpose of modulating pain, promoting relaxation,  increasing ROM, reducing/eliminating soft tissue swelling/inflammation/restriction, improving soft tissue extensibility and allowing for proper ROM for normal function with self care, mobility, lifting and ambulation. Modalities:  13' game-ready    Charges:  Timed Code Treatment Minutes: 47   Total Treatment Minutes: 62       [] EVAL (LOW) 30709 (typically 20 minutes face-to-face)  [] EVAL (MOD) 46774 (typically 30 minutes face-to-face)  [] EVAL (HIGH) 56915 (typically 45 minutes face-to-face)  [] RE-EVAL     [x] MR(41947) x 2    [] IONTO (77211)  [x] NMR (13444) x 1    [] VASO (13000) x   [] Manual (36091) x     [] Other:  [] TA (36971)x     [] Mech Traction (73563)  [] ES(attended) (37230)     [] ES (un) (93533): If BWC Please Indicate Time In/Out and Total Minutes  CPT Code Time in Time out Total Min                                           GOALS:  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 Progressing: []? Met: []? Not Met: []? Adjusted     Therapist goals for Patient:   Short Term Goals: To be achieved in: 2 weeks  1. Independent in HEP and progression per patient tolerance, in order to prevent re-injury. []? Progressing: []? Met: []? Not Met: []? Adjusted  2. Patient will have a decrease in pain to facilitate improvement in movement, function, and ADLs as indicated by Functional Deficits. []? Progressing: []? Met: []?  Not Met: []? Adjusted     Long Term Goals: To be achieved in: 8 weeks  1. Disability index score of 25% or less for the LEFS to assist with reaching prior level of function. []? Progressing: []? Met: []? Not Met: []? Adjusted  2. Patient will demonstrate increased AROM to WNL to allow for proper joint functioning as indicated by patients Functional Deficits. []? Progressing: []? Met: []? Not Met: []? Adjusted  3. Patient will demonstrate an increase in Strength to good proximal hip strength and control, within 5lb HHD in LE to allow for proper functional mobility as indicated by patients Functional Deficits. []? Progressing: []? Met: []? Not Met: []? Adjusted  4. Patient will return to 15+ minutes of ambulation without increased symptoms or restriction to return to PLOF. []? Progressing: []? Met: []? Not Met: []? Adjusted  5. Patient will tolerate 25+ minutes of sitting without increased symptoms or restriction to return to PLOF. []? Progressing: []? Met: []? Not Met: []? Adjusted       Progression Towards Functional goals:  [] Patient is progressing as expected towards functional goals listed. [] Progression is slowed due to complexities listed. [] Progression has been slowed due to co-morbidities. [x] Plan just implemented, too soon to assess goals progression  [] Other:     ASSESSMENT:  Pt demonstrates signifcant L knee swelling and TTP along lateral joint line. Pt tolerated patellar joint mobility well. 10' NMES with QS to facilitate quad activation with no pain; QS improved following NMES with noticeable medial quad contraction. Added prone HS curls w/ strap, long sitting PF, and TKE in NWB position with no increase in pain. Educated pt on importance of TTWB with all mobility to protect tissues. Continue skilled PT to reduce pain, increase ROM, enhance quad activation, and improve LE/hip strength within protocol to return to PLOF.      Return to Play: (if applicable)   []  Stage 1: Intro to Strength   []  Stage 2: Return to Run and Strength   []  Stage 3: Return to Jump and Strength   []  Stage 4: Dynamic Strength and Agility   []  Stage 5: Sport Specific Training     []  Ready to Return to Play, Meets All Above Stages   []  Not Ready for Return to Sports   Comments:            Treatment/Activity Tolerance:  [x] Patient tolerated treatment well [] Patient limited by fatique  [] Patient limited by pain  [] Patient limited by other medical complications  [] Other:     Overall Progression Towards Functional goals/ Treatment Progress Update:  [] Patient is progressing as expected towards functional goals listed. [] Progression is slowed due to complexities/Impairments listed. [] Progression has been slowed due to co-morbidities. [x] Plan just implemented, too soon to assess goals progression <30days   [] Goals require adjustment due to lack of progress  [] Patient is not progressing as expected and requires additional follow up with physician  [] Other    Prognosis for POC: [x] Good [] Fair  [] Poor    Patient requires continued skilled intervention: [x] Yes  [] No        PLAN: Decrease pain, increase knee ROM, improve quad contraction, and increase strength per protocol. Assess need for home NMES unit. [x] Continue per plan of care [] Alter current plan (see comments)  [] Plan of care initiated [] Hold pending MD visit [] Discharge    Electronically signed by: Rai Arthur, PT   Therapist was present, directed the patient's care, made skilled judgement, and was responsible for assessment and treatment of the patient. Samantha aCba, SPT      Note: If patient does not return for scheduled/recommended follow up visits, this note will serve as a discharge from care along with the most recent update on progress.

## 2022-03-10 ENCOUNTER — HOSPITAL ENCOUNTER (OUTPATIENT)
Dept: PHYSICAL THERAPY | Age: 32
Setting detail: THERAPIES SERIES
Discharge: HOME | End: 2022-03-10
Payer: COMMERCIAL

## 2022-03-10 PROCEDURE — 97110 THERAPEUTIC EXERCISES: CPT

## 2022-03-10 PROCEDURE — 97112 NEUROMUSCULAR REEDUCATION: CPT

## 2022-03-10 PROCEDURE — 97140 MANUAL THERAPY 1/> REGIONS: CPT

## 2022-03-10 NOTE — FLOWSHEET NOTE
[facility]    Physical Therapy Treatment Note/ Progress Report:     Date:  3/10/2022    Patient Name:  Pat Maldonado    :  1990  MRN: [id number]  Medical/Treatment Diagnosis Information:  · Diagnosis: S83.512D (ICD-10-CM) - Rupture of anterior cruciate ligament of left knee, subsequent [id number] (ICD-10-CM) - Tear of lateral meniscus of left knee, current, unspecified tear type, initial encounter  · Treatment Diagnosis: s/p ACLR w/ quad tendon allograft and partial lateral meniscectomy  Insurance/Certification information:  PT Insurance Information: Switched jobs - waiting for new ins  Physician Information:  Referring Practitioner: Dr. Linda Miles of care signed (Y/N):     Date of Patient follow up with Physician: 3/11/22     Progress Report: []  Yes  [x]  No     Functional Scale: LEFS = 67% disability Date: 3/3/22    Date Range for reporting period:  Beginning:  3/3/22  Endin/3/22    Progress report due (10 Rx/or 30 days whichever is less):      Recertification due (POC duration/ or 90 days whichever is less): 5/3/22     Visit # Insurance Allowable Auth Needed   3 Switched job - waiting for new ins []Yes    []No     Pain level:  0/10 currently, 1-2/10 worst     SUBJECTIVE: Pt states that he has had some slight swelling around the incision site but didn't get a chance to ice it yesterday. FU with MD tomorrow. Pt is going to [place] this weekend for wrestling state meet.      OBJECTIVE: s/p 2 weeks tomorrow   Observation:    Test measurements:      RESTRICTIONS/PRECAUTIONS: s/p L ACLR w/ quad tendon allograft and partial lateral meniscectomy (22) - pt is TTWB x 2 weeks, hx of L ACLR (10 years ago)    Exercises/Interventions:     Therapeutic Ex 25' Resistance Sets/sec Reps Notes   Quad sets    2  10\" 10  10 Not including QS w/ NMES   Supine heel slide with strap  2 10      Prone TKE Foam roll 2   10 NWB   S/L SLR  Standing hip abd    2   10 In brace; NPV  L only   Prone HS curl AAROM Strap  2 10    HS stretch at EOB  30\" 3    Standing hip ext   2 10 L only   Seated calf stretch    NPV                   Therapeutic Activities       Pt education 2'   TTWB                                                     Manual Intervention 10'       Knee mobs/PROM 7'  Gr II-III Ext   Tib/Fem Mobs       Patella Mobs 3'   Inf, sup, M/L   Ankle mobs                     NMR re-education 10'       Salvadorean/NMES 10'   W/ QS  10\" on/10\" off                                                               Therapeutic Exercise and NMR EXR  [] (14916) Provided verbal/tactile cueing for activities related to strengthening, flexibility, endurance, ROM for improvements in LE, proximal hip, and core control with self care, mobility, lifting, ambulation. [x] (76137) Provided verbal/tactile cueing for activities related to improving balance, coordination, kinesthetic sense, posture, motor skill, proprioception  to assist with LE, proximal hip, and core control in self care, mobility, lifting, ambulation and eccentric single leg control.      NMR and Therapeutic Activities:    [x] (49251 or 61991) Provided verbal/tactile cueing for activities related to improving balance, coordination, kinesthetic sense, posture, motor skill, proprioception and motor activation to allow for proper function of core, proximal hip and LE with self care and ADLs  [] (23589) Gait Re-education- Provided training and instruction to the patient for proper LE, core and proximal hip recruitment and positioning and eccentric body weight control with ambulation re-education including up and down stairs     Home Exercise Program:    [x] (63911) Reviewed/Progressed HEP activities related to strengthening, flexibility, endurance, ROM of core, proximal hip and LE for functional self-care, mobility, lifting and ambulation/stair navigation   [] (35860)Reviewed/Progressed HEP activities related to improving balance, coordination, kinesthetic sense, posture, motor skill, proprioception of core, proximal hip and LE for self care, mobility, lifting, and ambulation/stair navigation      Manual Treatments:  PROM / STM / Oscillations-Mobs:  G-I, II, III, IV (PA's, Inf., Post.)  [] (10033) Provided manual therapy to mobilize LE, proximal hip and/or LS spine soft tissue/joints for the purpose of modulating pain, promoting relaxation,  increasing ROM, reducing/eliminating soft tissue swelling/inflammation/restriction, improving soft tissue extensibility and allowing for proper ROM for normal function with self care, mobility, lifting and ambulation. Modalities:  13' game-ready    Charges:  Timed Code Treatment Minutes: 45   Total Treatment Minutes: 60       [] EVAL (LOW) 53633 (typically 20 minutes face-to-face)  [] EVAL (MOD) 20786 (typically 30 minutes face-to-face)  [] EVAL (HIGH) 04186 (typically 45 minutes face-to-face)  [] RE-EVAL     [x] DC(55239) x 1    [] IONTO (03963)  [x] NMR (87822) x 1    [] VASO (91975) x   [x] Manual (86293) x 1    [] Other:  [] TA (28558)x     [] Mech Traction (73156)  [] ES(attended) (38993)     [] ES (un) (32815): If BWC Please Indicate Time In/Out and Total Minutes  CPT Code Time in Time out Total Min                                           GOALS:  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 Progressing: []? Met: []? Not Met: []? Adjusted     Therapist goals for Patient:   Short Term Goals: To be achieved in: 2 weeks  1. Independent in HEP and progression per patient tolerance, in order to prevent re-injury. []? Progressing: []? Met: []? Not Met: []? Adjusted  2. Patient will have a decrease in pain to facilitate improvement in movement, function, and ADLs as indicated by Functional Deficits. []? Progressing: []? Met: []? Not Met: []? Adjusted     Long Term Goals: To be achieved in: 8 weeks  1.  Disability index score of 25% or less for the LEFS to assist with reaching prior level of function. []? Progressing: []? Met: []? Not Met: []? Adjusted  2. Patient will demonstrate increased AROM to WNL to allow for proper joint functioning as indicated by patients Functional Deficits. []? Progressing: []? Met: []? Not Met: []? Adjusted  3. Patient will demonstrate an increase in Strength to good proximal hip strength and control, within 5lb HHD in LE to allow for proper functional mobility as indicated by patients Functional Deficits. []? Progressing: []? Met: []? Not Met: []? Adjusted  4. Patient will return to 15+ minutes of ambulation without increased symptoms or restriction to return to PLOF. []? Progressing: []? Met: []? Not Met: []? Adjusted  5. Patient will tolerate 25+ minutes of sitting without increased symptoms or restriction to return to PLOF. []? Progressing: []? Met: []? Not Met: []? Adjusted       Progression Towards Functional goals:  [] Patient is progressing as expected towards functional goals listed. [] Progression is slowed due to complexities listed. [] Progression has been slowed due to co-morbidities. [x] Plan just implemented, too soon to assess goals progression  [] Other:     ASSESSMENT:  Pt demonstrates reduced L knee swelling and bruising; TTP along lateral patellar border. Pt presents with reduced extension, improved following manual therapy. 10' NMES with QS to facilitate quad activation with no pain; QS improved following NMES with noticeable medial quad contraction. Added HS stretch EOB to promote tissue extensibility and knee extension with no pain. Added prone TKE, standing hip abd, and standing hip ext in NWB position with no increase in pain. Discussed icing at meet, handicap placard, and maintaining TTWB while there d/t increased ambulatory distances. Continue skilled PT to reduce pain, increase ROM, enhance quad activation, and improve LE/hip strength within protocol to return to PLOF.      Return to Play: (if applicable)   []  Stage 1: Intro to Strength   []  Stage 2: Return to Run and Strength   []  Stage 3: Return to Jump and Strength   []  Stage 4: Dynamic Strength and Agility   []  Stage 5: Sport Specific Training     []  Ready to Return to Play, Meets All Above Stages   []  Not Ready for Return to Sports   Comments:            Treatment/Activity Tolerance:  [x] Patient tolerated treatment well [] Patient limited by fatique  [] Patient limited by pain  [] Patient limited by other medical complications  [] Other:     Overall Progression Towards Functional goals/ Treatment Progress Update:  [] Patient is progressing as expected towards functional goals listed. [] Progression is slowed due to complexities/Impairments listed. [] Progression has been slowed due to co-morbidities. [x] Plan just implemented, too soon to assess goals progression <30days   [] Goals require adjustment due to lack of progress  [] Patient is not progressing as expected and requires additional follow up with physician  [] Other    Prognosis for POC: [x] Good [] Fair  [] Poor    Patient requires continued skilled intervention: [x] Yes  [] No        PLAN: Decrease pain, increase knee ROM, improve quad contraction, and increase strength per protocol. Assess need for home NMES unit. [x] Continue per plan of care [] Alter current plan (see comments)  [] Plan of care initiated [] Hold pending MD visit [] Discharge    Electronically signed by: Kiki Garcia PT   Therapist was present, directed the patient's care, made skilled judgement, and was responsible for assessment and treatment of the patient. Samantha Caba, SPT      Note: If patient does not return for scheduled/recommended follow up visits, this note will serve as a discharge from care along with the most recent update on progress.

## 2022-03-11 ENCOUNTER — OFFICE VISIT (OUTPATIENT)
Dept: ORTHOPEDIC SURGERY | Age: 32
End: 2022-03-11

## 2022-03-11 VITALS — BODY MASS INDEX: 31.83 KG/M2 | WEIGHT: 210 LBS | HEIGHT: 68 IN

## 2022-03-11 DIAGNOSIS — S83.512D RUPTURE OF ANTERIOR CRUCIATE LIGAMENT OF LEFT KNEE, SUBSEQUENT ENCOUNTER: Primary | ICD-10-CM

## 2022-03-11 PROCEDURE — 99024 POSTOP FOLLOW-UP VISIT: CPT | Performed by: STUDENT IN AN ORGANIZED HEALTH CARE EDUCATION/TRAINING PROGRAM

## 2022-03-11 NOTE — PROGRESS NOTES
Knee Arthroscopy Follow-up  Yvette Velasquez is here for follow up after left knee arthroscopic surgery. Surgery date was 2/26/22. Findings at surgery: Left knee anterior cruciate ligament tear and lateral meniscus tear. Pain is controlled with current analgesics. Medication(s) being used: acetaminophen PRN. The patient's pain is rated at 0/10. The patient denies fever, wound drainage, increasing redness, pus, increasing swelling. Post op problems reported: none. He is ambulating with crutches with touch-down weight-bearing as instructed. Physical Examination:  Patient is awake, alert, and in no acute distress. The incisions are clean, dry, no drainage. There is no warmth, erythema, or purulent drainage over the incisions. Assessment:   2 weeks status post left knee arthroscopy, anterior cruciate ligament reconstruction with quadriceps tendon allograft and partial lateral meniscectomy. Plan:   Sutures were removed. Steri-strips and mastisol were applied. Patient may submerge incision under water at 4 weeks after surgery. Discussed the importance of physical therapy in regards to having a good outcome after ACL reconstruction. The patient may advance their weight-bearing with PT. 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 Refill pain medications as needed. I discussed the importance of brace wear. Brace until patient has normal gait and adequate quad strength. Brace can be discontinued by physical therapist when patient achieves that. No NSAIDs. No driving while on pain medications if it causes impairment. No driving until able to move leg to use gas / brake. Return to office at the 6 week postop time period for evaluation of progress or prn if problems.              Vale Duarte PA-C  Board Certified by the [employer] on Certification of [facility]         This note was generated with use of a verbal recognition program (DRAGON) and was checked for errors. It is possible that there are still dictated errors within this office note. If so, please bring any errors to my attention for an addendum. All efforts were made to ensure that this office note is accurate.

## 2022-03-15 ENCOUNTER — HOSPITAL ENCOUNTER (OUTPATIENT)
Dept: PHYSICAL THERAPY | Age: 32
Setting detail: THERAPIES SERIES
Discharge: HOME | End: 2022-03-15
Payer: COMMERCIAL

## 2022-03-15 PROCEDURE — 97112 NEUROMUSCULAR REEDUCATION: CPT

## 2022-03-15 PROCEDURE — 97140 MANUAL THERAPY 1/> REGIONS: CPT

## 2022-03-15 PROCEDURE — 97110 THERAPEUTIC EXERCISES: CPT

## 2022-03-15 NOTE — FLOWSHEET NOTE
[facility]    Physical Therapy Treatment Note/ Progress Report:     Date:  3/15/2022    Patient Name:  Domenic Tee    :  1990  MRN: [id number]  Medical/Treatment Diagnosis Information:  · Diagnosis: S83.512D (ICD-10-CM) - Rupture of anterior cruciate ligament of left knee, subsequent [id number] (ICD-10-CM) - Tear of lateral meniscus of left knee, current, unspecified tear type, initial encounter  · Treatment Diagnosis: s/p ACLR w/ quad tendon allograft and partial lateral meniscectomy  Insurance/Certification information:  PT Insurance Information: [facility] - $2400 deductible, 80%/20% co-insurance, 30 PT visits per calendar year  Physician Information:  Referring Practitioner: Dr. Marcos Christensen of care signed (Y/N):     Date of Patient follow up with Physician: 3/11/22     Progress Report: []  Yes  [x]  No     Functional Scale: LEFS = 67% disability Date: 3/3/22    Date Range for reporting period:  Beginning:  3/3/22  Endin/3/22    Progress report due (10 Rx/or 30 days whichever is less): 4/3/44     Recertification due (POC duration/ or 90 days whichever is less): 5/3/22     Visit # Insurance Allowable Auth Needed   4 30 []Yes    []No     Pain level:  0/10 currently     SUBJECTIVE: Pt reports that he was cleared for WBAT at FU with PA. Pt states that he was TTWB at [employer] meet this weekend. He states that overall he's doing pretty well following this weekend with some minor soreness at the end of the day. Pt states that he's at work full time with no issues.      OBJECTIVE: s/p 2.5 weeks    Observation:    Test measurements:     3/15: steristrips intact, no drainage or overt s/sx of infection    RESTRICTIONS/PRECAUTIONS: s/p L ACLR w/ quad tendon allograft and partial lateral meniscectomy (22), hx of L ACLR (10 years ago)    Exercises/Interventions:     Therapeutic Ex 23' Resistance Sets/sec Reps Notes   Quad sets 1  10\" 10  10 Not including QS w/ NMES   Supine heel slide with strap  2 10    TKE   Red 1 10   Not including TKE w/ NMES   S/L SLR    1   10        Prone HS curl AAROM Strap  2 10    HS stretch at EOB  30\" 3    Seated calf stretch  30\" 3    Standing HR  1 10                    Therapeutic Activities 2'       Pt education 2'   WBAT, crutches/brace, timeline                                                     Manual Intervention 10'       Knee mobs/PROM 7'  Gr II-III Ext   Tib/Fem Mobs       Patella Mobs 3'   Inf, sup, M/L   Ankle mobs                     NMR re-education 10'       Maldivian/NMES 10'   W/ QS (5') and TKE (5')  10\" on/10\" off                                                               Therapeutic Exercise and NMR EXR  [] (06071) Provided verbal/tactile cueing for activities related to strengthening, flexibility, endurance, ROM for improvements in LE, proximal hip, and core control with self care, mobility, lifting, ambulation. [x] (28346) Provided verbal/tactile cueing for activities related to improving balance, coordination, kinesthetic sense, posture, motor skill, proprioception  to assist with LE, proximal hip, and core control in self care, mobility, lifting, ambulation and eccentric single leg control.      NMR and Therapeutic Activities:    [x] (66313 or 92100) Provided verbal/tactile cueing for activities related to improving balance, coordination, kinesthetic sense, posture, motor skill, proprioception and motor activation to allow for proper function of core, proximal hip and LE with self care and ADLs  [] (85155) Gait Re-education- Provided training and instruction to the patient for proper LE, core and proximal hip recruitment and positioning and eccentric body weight control with ambulation re-education including up and down stairs     Home Exercise Program:    [x] (09463) Reviewed/Progressed HEP activities related to strengthening, flexibility, endurance, ROM of core, proximal hip and LE for functional self-care, mobility, lifting and ambulation/stair navigation   [] (32480)Reviewed/Progressed HEP activities related to improving balance, coordination, kinesthetic sense, posture, motor skill, proprioception of core, proximal hip and LE for self care, mobility, lifting, and ambulation/stair navigation      Manual Treatments:  PROM / STM / Oscillations-Mobs:  G-I, II, III, IV (PA's, Inf., Post.)  [x] (01014) Provided manual therapy to mobilize LE, proximal hip and/or LS spine soft tissue/joints for the purpose of modulating pain, promoting relaxation,  increasing ROM, reducing/eliminating soft tissue swelling/inflammation/restriction, improving soft tissue extensibility and allowing for proper ROM for normal function with self care, mobility, lifting and ambulation. Modalities:  13' game-ready    Charges:  Timed Code Treatment Minutes: 45   Total Treatment Minutes: 60       [] EVAL (LOW) 53631 (typically 20 minutes face-to-face)  [] EVAL (MOD) 74587 (typically 30 minutes face-to-face)  [] EVAL (HIGH) 87035 (typically 45 minutes face-to-face)  [] RE-EVAL     [x] FO(08558) x 1    [] IONTO (97364)  [x] NMR (50332) x 1    [] VASO (07500) x   [x] Manual (03631) x 1    [] Other:  [] TA (63843)x     [] Mech Traction (94649)  [] ES(attended) (79981)     [] ES (un) (25593): If BWC Please Indicate Time In/Out and Total Minutes  CPT Code Time in Time out Total Min                                           GOALS:  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 Progressing: []? Met: []? Not Met: []? Adjusted     Therapist goals for Patient:   Short Term Goals: To be achieved in: 2 weeks  1. Independent in HEP and progression per patient tolerance, in order to prevent re-injury. []? Progressing: []? Met: []? Not Met: []? Adjusted  2. Patient will have a decrease in pain to facilitate improvement in movement, function, and ADLs as indicated by Functional Deficits. []? Progressing: []?  Met: []? Not Met: []? Adjusted     Long Term Goals: To be achieved in: 8 weeks  1. Disability index score of 25% or less for the LEFS to assist with reaching prior level of function. []? Progressing: []? Met: []? Not Met: []? Adjusted  2. Patient will demonstrate increased AROM to WNL to allow for proper joint functioning as indicated by patients Functional Deficits. []? Progressing: []? Met: []? Not Met: []? Adjusted  3. Patient will demonstrate an increase in Strength to good proximal hip strength and control, within 5lb HHD in LE to allow for proper functional mobility as indicated by patients Functional Deficits. []? Progressing: []? Met: []? Not Met: []? Adjusted  4. Patient will return to 15+ minutes of ambulation without increased symptoms or restriction to return to PLOF. []? Progressing: []? Met: []? Not Met: []? Adjusted  5. Patient will tolerate 25+ minutes of sitting without increased symptoms or restriction to return to PLOF. []? Progressing: []? Met: []? Not Met: []? Adjusted       Progression Towards Functional goals:  [] Patient is progressing as expected towards functional goals listed. [] Progression is slowed due to complexities listed. [] Progression has been slowed due to co-morbidities. [x] Plan just implemented, too soon to assess goals progression  [] Other:     ASSESSMENT:  Pt demonstrates reduced L knee swelling and bruising and improved quad contraction. Pt presents with reduced extension, improved following manual therapy. 10' NMES with QS (5') and TKE (5') to facilitate quad activation with no pain; QS improved following NMES with noticeable VMO contraction. Added seated calf stretch to increase tissue extensibility and knee extension with no pain. Added S/L SLR and standing HR with noted difficulty and no pain. Educated pt on WBAT, as well as expected timeline for crutches and brace.  Educated pt on expected timeline considering partial lateral meniscectomy and provdied protocol for ambulation, jogging, and rowing. Continue skilled PT to reduce pain, increase ROM, enhance quad activation, and improve LE/hip strength within protocol to return to PLOF. Return to Play: (if applicable)   []  Stage 1: Intro to Strength   []  Stage 2: Return to Run and Strength   []  Stage 3: Return to Jump and Strength   []  Stage 4: Dynamic Strength and Agility   []  Stage 5: Sport Specific Training     []  Ready to Return to Play, Meets All Above Stages   []  Not Ready for Return to Sports   Comments:            Treatment/Activity Tolerance:  [x] Patient tolerated treatment well [] Patient limited by fatique  [] Patient limited by pain  [] Patient limited by other medical complications  [] Other:     Overall Progression Towards Functional goals/ Treatment Progress Update:  [] Patient is progressing as expected towards functional goals listed. [] Progression is slowed due to complexities/Impairments listed. [] Progression has been slowed due to co-morbidities. [x] Plan just implemented, too soon to assess goals progression <30days   [] Goals require adjustment due to lack of progress  [] Patient is not progressing as expected and requires additional follow up with physician  [] Other    Prognosis for POC: [x] Good [] Fair  [] Poor    Patient requires continued skilled intervention: [x] Yes  [] No        PLAN: Decrease pain, increase knee ROM, improve quad contraction, and increase strength per protocol. Assess need for home NMES unit. WBAT w/ B axillary crutches and hinge brace locked in extension. [x] Continue per plan of care [] Alter current plan (see comments)  [] Plan of care initiated [] Hold pending MD visit [] Discharge    Electronically signed by: Beba Douglas, PT   Therapist was present, directed the patient's care, made skilled judgement, and was responsible for assessment and treatment of the patient.  Samantha Caba, SPT      Note: If patient does not return for scheduled/recommended follow up visits, this note will serve as a discharge from care along with the most recent update on progress.

## 2022-03-17 ENCOUNTER — HOSPITAL ENCOUNTER (OUTPATIENT)
Dept: PHYSICAL THERAPY | Age: 32
Setting detail: THERAPIES SERIES
Discharge: HOME | End: 2022-03-17
Payer: COMMERCIAL

## 2022-03-17 PROCEDURE — 97110 THERAPEUTIC EXERCISES: CPT

## 2022-03-17 PROCEDURE — 97112 NEUROMUSCULAR REEDUCATION: CPT

## 2022-03-17 PROCEDURE — 97140 MANUAL THERAPY 1/> REGIONS: CPT

## 2022-03-17 NOTE — FLOWSHEET NOTE
[facility]    Physical Therapy Treatment Note/ Progress Report:     Date:  3/17/2022    Patient Name:  Emile Guzman    :  1990  MRN: [id number]  Medical/Treatment Diagnosis Information:  · Diagnosis: S83.512D (ICD-10-CM) - Rupture of anterior cruciate ligament of left knee, subsequent [id number] (ICD-10-CM) - Tear of lateral meniscus of left knee, current, unspecified tear type, initial encounter  · Treatment Diagnosis: s/p ACLR w/ quad tendon allograft and partial lateral meniscectomy  Insurance/Certification information:  PT Insurance Information: [facility] - $2400 deductible, 80%/20% co-insurance, 30 PT visits per calendar year  Physician Information:  Referring Practitioner: Dr. Alida Bailon of care signed (Y/N):     Date of Patient follow up with Physician: 3/11/22     Progress Report: []  Yes  [x]  No     Functional Scale: LEFS = 67% disability Date: 3/3/22    Date Range for reporting period:  Beginning:  3/3/22  Endin/3/22    Progress report due (10 Rx/or 30 days whichever is less): 42     Recertification due (POC duration/ or 90 days whichever is less): 5/3/22     Visit # Insurance Allowable Auth Needed   5 30 []Yes    []No     Pain level:  1/10 currently     SUBJECTIVE: Pt reports that he has minor discomfort since its the end of the day. He notes that he had some discomfort with supine heel slides d/t anterior tightness.      OBJECTIVE: s/p 3 weeks tomorrow   Observation:    Test measurements:     3/15: steristrips intact, no drainage or overt s/sx of infection    RESTRICTIONS/PRECAUTIONS: s/p L ACLR w/ quad tendon allograft and partial lateral meniscectomy (22), hx of L ACLR (10 years ago)    Exercises/Interventions:     Therapeutic Ex 25' Resistance Sets/sec Reps Notes   Quad sets    1  10\" 10  10 Not including QS w/ NMES   Supine heel slide with strap  Wall slide knee flexion  2  2 10  10    TKE   Blue  1 10   Not including TKE w/ NMES   S/L SLR    1   10        Prone HS curl   2 10    HS stretch at EOB  30\" 3    Seated calf stretch  IB calf stretch  30\" 3    Standing HR  1 10    Supine SLR flex  1 10    ANEUDY abd 75# 1 10                    Therapeutic Activities                                                                Manual Intervention 10'       Knee mobs/PROM 7'  Gr II-III Ext   Tib/Fem Mobs       Patella Mobs 3'   Inf, sup, M/L   Ankle mobs                     NMR re-education 10'       Uzbek/NMES 10'   W/ QS (5') and TKE (5')  10\" on/10\" off                                                               Therapeutic Exercise and NMR EXR  [x] (26080) Provided verbal/tactile cueing for activities related to strengthening, flexibility, endurance, ROM for improvements in LE, proximal hip, and core control with self care, mobility, lifting, ambulation.  [] (04126) Provided verbal/tactile cueing for activities related to improving balance, coordination, kinesthetic sense, posture, motor skill, proprioception  to assist with LE, proximal hip, and core control in self care, mobility, lifting, ambulation and eccentric single leg control.      NMR and Therapeutic Activities:    [x] (95563 or 03705) Provided verbal/tactile cueing for activities related to improving balance, coordination, kinesthetic sense, posture, motor skill, proprioception and motor activation to allow for proper function of core, proximal hip and LE with self care and ADLs  [] (81953) Gait Re-education- Provided training and instruction to the patient for proper LE, core and proximal hip recruitment and positioning and eccentric body weight control with ambulation re-education including up and down stairs     Home Exercise Program:    [x] (49354) Reviewed/Progressed HEP activities related to strengthening, flexibility, endurance, ROM of core, proximal hip and LE for functional self-care, mobility, lifting and ambulation/stair navigation   [] (45018)Reviewed/Progressed HEP activities related to improving balance, coordination, kinesthetic sense, posture, motor skill, proprioception of core, proximal hip and LE for self care, mobility, lifting, and ambulation/stair navigation      Manual Treatments:  PROM / STM / Oscillations-Mobs:  G-I, II, III, IV (PA's, Inf., Post.)  [x] (91181) Provided manual therapy to mobilize LE, proximal hip and/or LS spine soft tissue/joints for the purpose of modulating pain, promoting relaxation,  increasing ROM, reducing/eliminating soft tissue swelling/inflammation/restriction, improving soft tissue extensibility and allowing for proper ROM for normal function with self care, mobility, lifting and ambulation. Modalities:  10' game-ready    Charges:  Timed Code Treatment Minutes: 45   Total Treatment Minutes: 55       [] EVAL (LOW) 79589 (typically 20 minutes face-to-face)  [] EVAL (MOD) 83292 (typically 30 minutes face-to-face)  [] EVAL (HIGH) 09118 (typically 45 minutes face-to-face)  [] RE-EVAL     [x] LZ(41099) x 1    [] IONTO (23545)  [x] NMR (67313) x 1    [] VASO (44121) x   [x] Manual (06644) x 1    [] Other:  [] TA (31933)x     [] Mech Traction (01851)  [] ES(attended) (33439)     [] ES (un) (18922): If BWC Please Indicate Time In/Out and Total Minutes  CPT Code Time in Time out Total Min                                           GOALS:  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 Progressing: []? Met: []? Not Met: []? Adjusted     Therapist goals for Patient:   Short Term Goals: To be achieved in: 2 weeks  1. Independent in HEP and progression per patient tolerance, in order to prevent re-injury. []? Progressing: []? Met: []? Not Met: []? Adjusted  2. Patient will have a decrease in pain to facilitate improvement in movement, function, and ADLs as indicated by Functional Deficits. []? Progressing: []? Met: []? Not Met: []? Adjusted     Long Term Goals: To be achieved in: 8 weeks  1. Disability index score of 25% or less for the LEFS to assist with reaching prior level of function. []? Progressing: []? Met: []? Not Met: []? Adjusted  2. Patient will demonstrate increased AROM to WNL to allow for proper joint functioning as indicated by patients Functional Deficits. []? Progressing: []? Met: []? Not Met: []? Adjusted  3. Patient will demonstrate an increase in Strength to good proximal hip strength and control, within 5lb HHD in LE to allow for proper functional mobility as indicated by patients Functional Deficits. []? Progressing: []? Met: []? Not Met: []? Adjusted  4. Patient will return to 15+ minutes of ambulation without increased symptoms or restriction to return to PLOF. []? Progressing: []? Met: []? Not Met: []? Adjusted  5. Patient will tolerate 25+ minutes of sitting without increased symptoms or restriction to return to PLOF. []? Progressing: []? Met: []? Not Met: []? Adjusted       Progression Towards Functional goals:  [] Patient is progressing as expected towards functional goals listed. [] Progression is slowed due to complexities listed. [] Progression has been slowed due to co-morbidities. [x] Plan just implemented, too soon to assess goals progression  [] Other:     ASSESSMENT:  Pt demonstrates reduced L knee swelling and bruising and improved quad contraction. Pt presents with reduced extension, improved following manual therapy. 10' NMES with QS (5') and TKE (5') to facilitate quad activation with no pain; QS improved following NMES with noticeable VMO contraction. Added IB calf stretch to increase tissue extensibility and knee extension with no pain. Added supine SLR and ANEUDY abd with noted difficulty and no pain. Continue skilled PT to reduce pain, increase ROM, enhance quad activation, and improve LE/hip strength within protocol to return to PLOF.      Return to Play: (if applicable)   []  Stage 1: Intro to Strength   []  Stage 2: Return to Run and Strength   []  Stage 3: Return to Jump and Strength   []  Stage 4: Dynamic Strength and Agility   []  Stage 5: Sport Specific Training     []  Ready to Return to Play, Meets All Above Stages   []  Not Ready for Return to Sports   Comments:            Treatment/Activity Tolerance:  [x] Patient tolerated treatment well [] Patient limited by fatique  [] Patient limited by pain  [] Patient limited by other medical complications  [] Other:     Overall Progression Towards Functional goals/ Treatment Progress Update:  [] Patient is progressing as expected towards functional goals listed. [] Progression is slowed due to complexities/Impairments listed. [] Progression has been slowed due to co-morbidities. [x] Plan just implemented, too soon to assess goals progression <30days   [] Goals require adjustment due to lack of progress  [] Patient is not progressing as expected and requires additional follow up with physician  [] Other    Prognosis for POC: [x] Good [] Fair  [] Poor    Patient requires continued skilled intervention: [x] Yes  [] No        PLAN: Decrease pain, increase knee ROM, improve quad contraction, and increase strength per protocol. Assess need for home NMES unit. WBAT w/ B axillary crutches and hinge brace locked in extension. [x] Continue per plan of care [] Alter current plan (see comments)  [] Plan of care initiated [] Hold pending MD visit [] Discharge    Electronically signed by: Lissette Kim PT   Therapist was present, directed the patient's care, made skilled judgement, and was responsible for assessment and treatment of the patient. Samantha Caba, SPT      Note: If patient does not return for scheduled/recommended follow up visits, this note will serve as a discharge from care along with the most recent update on progress.

## 2022-03-21 ENCOUNTER — HOSPITAL ENCOUNTER (OUTPATIENT)
Dept: PHYSICAL THERAPY | Age: 32
Setting detail: THERAPIES SERIES
Discharge: HOME | End: 2022-03-21
Payer: COMMERCIAL

## 2022-03-21 PROCEDURE — 97110 THERAPEUTIC EXERCISES: CPT

## 2022-03-21 PROCEDURE — 97140 MANUAL THERAPY 1/> REGIONS: CPT

## 2022-03-21 NOTE — FLOWSHEET NOTE
[facility] [employer]    Physical Therapy Treatment Note/ Progress Report:     Date:  3/21/2022    Patient Name:  Vita Covarrubias    :  1990  MRN: [id number]  Medical/Treatment Diagnosis Information:  · Diagnosis: S83.512D (ICD-10-CM) - Rupture of anterior cruciate ligament of left knee, subsequent [id number] (ICD-10-CM) - Tear of lateral meniscus of left knee, current, unspecified tear type, initial encounter  · Treatment Diagnosis: s/p ACLR w/ quad tendon allograft and partial lateral meniscectomy  Insurance/Certification information:  PT Insurance Information: [facility] - $2400 deductible, 80%/20% co-insurance, 30 PT visits per calendar year  Physician Information:  Referring Practitioner: Dr. Arpita Nevarze of care signed (Y/N):     Date of Patient follow up with Physician: 3/11/22     Progress Report: []  Yes  [x]  No     Functional Scale: LEFS = 67% disability Date: 3/3/22    Date Range for reporting period:  Beginning:  3/3/22  Endin/3/22    Progress report due (10 Rx/or 30 days whichever is less): 41     Recertification due (POC duration/ or 90 days whichever is less): 5/3/22     Visit # Insurance Allowable Auth Needed   6 30 []Yes    []No     Pain level:  1/10 at most     SUBJECTIVE: Pt reports that he didn't do too much this weekend. He notes that the longer he was up and moving around the house, the more he felt some soreness.      OBJECTIVE: s/p 3 weeks    Observation:    Test measurements:     3/15: steristrips intact, no drainage or overt s/sx of infection    RESTRICTIONS/PRECAUTIONS: s/p L ACLR w/ quad tendon allograft and partial lateral meniscectomy (22), hx of L ACLR (10 years ago)    Exercises/Interventions:     Therapeutic Ex 25' Resistance Sets/sec Reps Notes   Bike   NPVQuad sets    1  10\" 10  10    Supine heel slide with strap    2   10      TKE   Blue  2 10      S/L SLR    2   10        Prone HS curl   2 10    HS stretch at EOB  Supine HS stretch w/ strap  30\"  10\" 3  10      IB calf stretch    30\"   3    Standing HR  1 10    Supine SLR flex  2 10    ANEUDY abd 75# 3 10                    Therapeutic Activities 1'       Gait training  1'   Increased LLE WB'ing, heel-toe pattern                                                    Manual Intervention 12'       Knee mobs/PROM 9'  Gr II-III Ext   Tib/Fem Mobs       Patella Mobs 3'   Inf, sup, M/L   Ankle mobs                     NMR re-education 3'       [place]e/NMES Narrow stance Airex 30\" 3                                                         Therapeutic Exercise and NMR EXR  [x] (74341) Provided verbal/tactile cueing for activities related to strengthening, flexibility, endurance, ROM for improvements in LE, proximal hip, and core control with self care, mobility, lifting, ambulation.  [] (97576) Provided verbal/tactile cueing for activities related to improving balance, coordination, kinesthetic sense, posture, motor skill, proprioception  to assist with LE, proximal hip, and core control in self care, mobility, lifting, ambulation and eccentric single leg control.      NMR and Therapeutic Activities:    [x] (61206 or 46576) Provided verbal/tactile cueing for activities related to improving balance, coordination, kinesthetic sense, posture, motor skill, proprioception and motor activation to allow for proper function of core, proximal hip and LE with self care and ADLs  [] (77867) Gait Re-education- Provided training and instruction to the patient for proper LE, core and proximal hip recruitment and positioning and eccentric body weight control with ambulation re-education including up and down stairs     Home Exercise Program:    [x] (67279) Reviewed/Progressed HEP activities related to strengthening, flexibility, endurance, ROM of core, proximal hip and LE for functional self-care, mobility, lifting and ambulation/stair navigation   [] (68020)Reviewed/Progressed HEP activities related to improving balance, coordination, kinesthetic sense, posture, motor skill, proprioception of core, proximal hip and LE for self care, mobility, lifting, and ambulation/stair navigation      Manual Treatments:  PROM / STM / Oscillations-Mobs:  G-I, II, III, IV (PA's, Inf., Post.)  [x] (82819) Provided manual therapy to mobilize LE, proximal hip and/or LS spine soft tissue/joints for the purpose of modulating pain, promoting relaxation,  increasing ROM, reducing/eliminating soft tissue swelling/inflammation/restriction, improving soft tissue extensibility and allowing for proper ROM for normal function with self care, mobility, lifting and ambulation. Modalities:  10' game-ready    Charges:  Timed Code Treatment Minutes: 41   Total Treatment Minutes: 51       [] EVAL (LOW) 03270 (typically 20 minutes face-to-face)  [] EVAL (MOD) 83843 (typically 30 minutes face-to-face)  [] EVAL (HIGH) 75107 (typically 45 minutes face-to-face)  [] RE-EVAL     [x] LS(97494) x 2    [] IONTO (38183)  [] NMR (97695) x     [] VASO (81907) x   [x] Manual (67677) x 1    [] Other:  [] TA (12532)x     [] Mech Traction (15201)  [] ES(attended) (52276)     [] ES (un) (31564): If BWC Please Indicate Time In/Out and Total Minutes  CPT Code Time in Time out Total Min                                           GOALS:  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 Progressing: []? Met: []? Not Met: []? Adjusted     Therapist goals for Patient:   Short Term Goals: To be achieved in: 2 weeks  1. Independent in HEP and progression per patient tolerance, in order to prevent re-injury. []? Progressing: []? Met: []? Not Met: []? Adjusted  2. Patient will have a decrease in pain to facilitate improvement in movement, function, and ADLs as indicated by Functional Deficits. []? Progressing: []? Met: []? Not Met: []? Adjusted     Long Term Goals: To be achieved in: 8 weeks  1.  Disability index score of 25% or less for the LEFS to assist with reaching prior level of function. []? Progressing: []? Met: []? Not Met: []? Adjusted  2. Patient will demonstrate increased AROM to WNL to allow for proper joint functioning as indicated by patients Functional Deficits. []? Progressing: []? Met: []? Not Met: []? Adjusted  3. Patient will demonstrate an increase in Strength to good proximal hip strength and control, within 5lb HHD in LE to allow for proper functional mobility as indicated by patients Functional Deficits. []? Progressing: []? Met: []? Not Met: []? Adjusted  4. Patient will return to 15+ minutes of ambulation without increased symptoms or restriction to return to PLOF. []? Progressing: []? Met: []? Not Met: []? Adjusted  5. Patient will tolerate 25+ minutes of sitting without increased symptoms or restriction to return to PLOF. []? Progressing: []? Met: []? Not Met: []? Adjusted       Progression Towards Functional goals:  [] Patient is progressing as expected towards functional goals listed. [] Progression is slowed due to complexities listed. [] Progression has been slowed due to co-morbidities. [x] Plan just implemented, too soon to assess goals progression  [] Other:     ASSESSMENT:  Pt demonstrates reduced L knee swelling and bruising and improved quad contraction. Pt presents with reduced extension, improved following manual therapy. Added supine HS stretch with strap to increase tissue extensibility and knee extension with no pain. Added narrow stance on airex with noted difficulty but no pain. Advised pt to increase WB'ing through LLE as tolerated and utilize a heel-toe gait pattern. Continue skilled PT to reduce pain, increase ROM, enhance quad activation, and improve LE/hip strength within protocol to return to PLOF.      Return to Play: (if applicable)   []  Stage 1: Intro to Strength   []  Stage 2: Return to Run and Strength   []  Stage 3: Return to Jump and Strength   []  Stage 4: Dynamic Strength and Agility   []  Stage 5: Sport Specific Training     []  Ready to Return to Play, Meets All Above Stages   []  Not Ready for Return to Sports   Comments:            Treatment/Activity Tolerance:  [x] Patient tolerated treatment well [] Patient limited by fatique  [] Patient limited by pain  [] Patient limited by other medical complications  [] Other:     Overall Progression Towards Functional goals/ Treatment Progress Update:  [] Patient is progressing as expected towards functional goals listed. [] Progression is slowed due to complexities/Impairments listed. [] Progression has been slowed due to co-morbidities. [x] Plan just implemented, too soon to assess goals progression <30days   [] Goals require adjustment due to lack of progress  [] Patient is not progressing as expected and requires additional follow up with physician  [] Other    Prognosis for POC: [x] Good [] Fair  [] Poor    Patient requires continued skilled intervention: [x] Yes  [] No        PLAN: Decrease pain, increase knee ROM, improve quad contraction, and increase strength per protocol. WBAT w/ B axillary crutches and hinge brace locked in extension. Bike NPV. [x] Continue per plan of care [] Alter current plan (see comments)  [] Plan of care initiated [] Hold pending MD visit [] Discharge    Electronically signed by: Hector Pro, PT   Therapist was present, directed the patient's care, made skilled judgement, and was responsible for assessment and treatment of the patient. Samantha Caba, SPT      Note: If patient does not return for scheduled/recommended follow up visits, this note will serve as a discharge from care along with the most recent update on progress.

## 2022-03-23 ENCOUNTER — HOSPITAL ENCOUNTER (OUTPATIENT)
Dept: PHYSICAL THERAPY | Age: 32
Setting detail: THERAPIES SERIES
Discharge: HOME | End: 2022-03-23
Payer: COMMERCIAL

## 2022-03-23 PROCEDURE — 97140 MANUAL THERAPY 1/> REGIONS: CPT

## 2022-03-23 PROCEDURE — 97112 NEUROMUSCULAR REEDUCATION: CPT

## 2022-03-23 PROCEDURE — 97530 THERAPEUTIC ACTIVITIES: CPT

## 2022-03-23 NOTE — FLOWSHEET NOTE
[employer]    Physical Therapy Treatment Note/ Progress Report:     Date:  3/23/2022    Patient Name:  Morteza Laguna    :  1990  MRN: [id number]  Medical/Treatment Diagnosis Information:  · Diagnosis: S83.512D (ICD-10-CM) - Rupture of anterior cruciate ligament of left knee, subsequent [id number] (ICD-10-CM) - Tear of lateral meniscus of left knee, current, unspecified tear type, initial encounter  · Treatment Diagnosis: s/p ACLR w/ quad tendon allograft and partial lateral meniscectomy  Insurance/Certification information:  PT Insurance Information: [facility] - $2400 deductible, 80%/20% co-insurance, 30 PT visits per calendar year  Physician Information:  Referring Practitioner: Dr. Kalyan Ospina of care signed (Y/N):     Date of Patient follow up with Physician: 3/11/22     Progress Report: []  Yes  [x]  No     Functional Scale: LEFS = 67% disability Date: 3/3/22    Date Range for reporting period:  Beginning:  3/3/22  Endin/3/22    Progress report due (10 Rx/or 30 days whichever is less): 8/3/31     Recertification due (POC duration/ or 90 days whichever is less): 5/3/22     Visit # Insurance Allowable Auth Needed   7 30 []Yes    []No     Pain level:  1/10 at most     SUBJECTIVE: Pt reports that he has been doing well and hasn't noticed too much soreness. He states that walking is getting more normal but it's still difficult especially since he's locked out.      OBJECTIVE: s/p 3.5 weeks    Observation:    Test measurements:     3/15: steristrips intact, no drainage or overt s/sx of infection    RESTRICTIONS/PRECAUTIONS: s/p L ACLR w/ quad tendon allograft and partial lateral meniscectomy (22), hx of L ACLR (10 years ago)    Exercises/Interventions:     Therapeutic Ex 20' Resistance Sets/sec Reps Notes   Recumbent bike5'   Quad sets    1  10\" 10  10 W/o NMES   See below   Supine heel slide with strap    2   10      TKE Blue  1 10   W/o NMES   See below   S/L SLR    2   10        Prone HS curl   2 10    HS stretch at EOB  Supine HS stretch w/ strap  30\"  10\" 3  10      IB calf stretch    30\"   3    HEP   Supine SLR flex  1 10 W/o NMES  See below   [facility] abd 75# 3 10                    Therapeutic Activities                                                         Manual Intervention 14'       Knee mobs/PROM 11'  Gr II-III Ext   Tib/Fem Mobs       Patella Mobs 3'   Inf, sup, M/L   Ankle mobs                     NMR re-education 13'       British/NMES 10'   W/ QS (3'), SLR (3'), and TKE (4')  4\" on/12\" off   Narrow stance Airex 30\" 3                                                         Therapeutic Exercise and NMR EXR  [x] (19085) Provided verbal/tactile cueing for activities related to strengthening, flexibility, endurance, ROM for improvements in LE, proximal hip, and core control with self care, mobility, lifting, ambulation.  [] (65301) Provided verbal/tactile cueing for activities related to improving balance, coordination, kinesthetic sense, posture, motor skill, proprioception  to assist with LE, proximal hip, and core control in self care, mobility, lifting, ambulation and eccentric single leg control.      NMR and Therapeutic Activities:    [x] (34676 or 32423) Provided verbal/tactile cueing for activities related to improving balance, coordination, kinesthetic sense, posture, motor skill, proprioception and motor activation to allow for proper function of core, proximal hip and LE with self care and ADLs  [] (00017) Gait Re-education- Provided training and instruction to the patient for proper LE, core and proximal hip recruitment and positioning and eccentric body weight control with ambulation re-education including up and down stairs     Home Exercise Program:    [x] (16004) Reviewed/Progressed HEP activities related to strengthening, flexibility, endurance, ROM of core, proximal hip and LE for functional self-care, mobility, lifting and ambulation/stair navigation   [] (83157)Reviewed/Progressed HEP activities related to improving balance, coordination, kinesthetic sense, posture, motor skill, proprioception of core, proximal hip and LE for self care, mobility, lifting, and ambulation/stair navigation      Manual Treatments:  PROM / STM / Oscillations-Mobs:  G-I, II, III, IV (PA's, Inf., Post.)  [x] (00650) Provided manual therapy to mobilize LE, proximal hip and/or LS spine soft tissue/joints for the purpose of modulating pain, promoting relaxation,  increasing ROM, reducing/eliminating soft tissue swelling/inflammation/restriction, improving soft tissue extensibility and allowing for proper ROM for normal function with self care, mobility, lifting and ambulation. Modalities:  10' game-ready    Charges:  Timed Code Treatment Minutes: 47   Total Treatment Minutes: 57       [] EVAL (LOW) 97155 (typically 20 minutes face-to-face)  [] EVAL (MOD) 05173 (typically 30 minutes face-to-face)  [] EVAL (HIGH) 30980 (typically 45 minutes face-to-face)  [] RE-EVAL     [x] BL(24633) x 1    [] IONTO (87330)  [x] NMR (36609) x 1    [] VASO (26300) x   [x] Manual (07775) x 1    [] Other:  [] TA (43167)x     [] Mech Traction (33366)  [] ES(attended) (09792)     [] ES (un) (18930): If BWC Please Indicate Time In/Out and Total Minutes  CPT Code Time in Time out Total Min                                           GOALS:  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 Progressing: []? Met: []? Not Met: []? Adjusted     Therapist goals for Patient:   Short Term Goals: To be achieved in: 2 weeks  1. Independent in HEP and progression per patient tolerance, in order to prevent re-injury. []? Progressing: []? Met: []? Not Met: []? Adjusted  2. Patient will have a decrease in pain to facilitate improvement in movement, function, and ADLs as indicated by Functional Deficits. []? Progressing: []? Met: []?  Not Met: []? Adjusted     Long Term Goals: To be achieved in: 8 weeks  1. Disability index score of 25% or less for the LEFS to assist with reaching prior level of function. []? Progressing: []? Met: []? Not Met: []? Adjusted  2. Patient will demonstrate increased AROM to WNL to allow for proper joint functioning as indicated by patients Functional Deficits. []? Progressing: []? Met: []? Not Met: []? Adjusted  3. Patient will demonstrate an increase in Strength to good proximal hip strength and control, within 5lb HHD in LE to allow for proper functional mobility as indicated by patients Functional Deficits. []? Progressing: []? Met: []? Not Met: []? Adjusted  4. Patient will return to 15+ minutes of ambulation without increased symptoms or restriction to return to PLOF. []? Progressing: []? Met: []? Not Met: []? Adjusted  5. Patient will tolerate 25+ minutes of sitting without increased symptoms or restriction to return to PLOF. []? Progressing: []? Met: []? Not Met: []? Adjusted       Progression Towards Functional goals:  [] Patient is progressing as expected towards functional goals listed. [] Progression is slowed due to complexities listed. [] Progression has been slowed due to co-morbidities. [x] Plan just implemented, too soon to assess goals progression  [] Other:     ASSESSMENT:  Pt demonstrates reduced L knee swelling and bruising and improved quad contraction. Pt presents with reduced extension, improved following manual therapy. Used NMES to better facilitate quad contraction with 4\" on/12\" off with QS (3'), TKE (4'), and SLR (3'). Added bike to address ROM with no pain or sxs. Advised pt to increase WB'ing through LLE as tolerated and utilize a heel-toe gait pattern. Continue skilled PT to reduce pain, increase ROM, enhance quad activation, and improve LE/hip strength within protocol to return to PLOF.      Return to Play: (if applicable)   []  Stage 1: Intro to Strength   []  Stage 2: Return to Run and Strength   []  Stage 3: Return to Jump and Strength   []  Stage 4: Dynamic Strength and Agility   []  Stage 5: Sport Specific Training     []  Ready to Return to Play, Meets All Above Stages   []  Not Ready for Return to Sports   Comments:            Treatment/Activity Tolerance:  [x] Patient tolerated treatment well [] Patient limited by fatique  [] Patient limited by pain  [] Patient limited by other medical complications  [] Other:     Overall Progression Towards Functional goals/ Treatment Progress Update:  [] Patient is progressing as expected towards functional goals listed. [] Progression is slowed due to complexities/Impairments listed. [] Progression has been slowed due to co-morbidities. [x] Plan just implemented, too soon to assess goals progression <30days   [] Goals require adjustment due to lack of progress  [] Patient is not progressing as expected and requires additional follow up with physician  [] Other    Prognosis for POC: [x] Good [] Fair  [] Poor    Patient requires continued skilled intervention: [x] Yes  [] No        PLAN: Decrease pain, increase knee ROM, improve quad contraction, and increase strength per protocol. WBAT w/ B axillary crutches and hinge brace locked in extension. [x] Continue per plan of care [] Alter current plan (see comments)  [] Plan of care initiated [] Hold pending MD visit [] Discharge    Electronically signed by: Lisset Ford, PT   Therapist was present, directed the patient's care, made skilled judgement, and was responsible for assessment and treatment of the patient. Samantha Caba, SPT      Note: If patient does not return for scheduled/recommended follow up visits, this note will serve as a discharge from care along with the most recent update on progress.

## 2022-03-28 ENCOUNTER — HOSPITAL ENCOUNTER (OUTPATIENT)
Dept: PHYSICAL THERAPY | Age: 32
Setting detail: THERAPIES SERIES
Discharge: HOME | End: 2022-03-28
Payer: COMMERCIAL

## 2022-03-28 PROCEDURE — 97112 NEUROMUSCULAR REEDUCATION: CPT

## 2022-03-28 PROCEDURE — 97110 THERAPEUTIC EXERCISES: CPT

## 2022-03-28 PROCEDURE — 97140 MANUAL THERAPY 1/> REGIONS: CPT

## 2022-03-28 NOTE — FLOWSHEET NOTE
[facility] [facility]    Physical Therapy Treatment Note/ Progress Report:     Date:  3/28/2022    Patient Name:  Rasheed Neville    :  1990  MRN: [id number]  Medical/Treatment Diagnosis Information:  · Diagnosis: S83.512D (ICD-10-CM) - Rupture of anterior cruciate ligament of left knee, subsequent [id number] (ICD-10-CM) - Tear of lateral meniscus of left knee, current, unspecified tear type, initial encounter  · Treatment Diagnosis: s/p ACLR w/ quad tendon allograft and partial lateral meniscectomy  Insurance/Certification information:  PT Insurance Information: [facility] - $2400 deductible, 80%/20% co-insurance, 30 PT visits per calendar year  Physician Information:  Referring Practitioner: Dr. Srikanth Johnson of care signed (Y/N):     Date of Patient follow up with Physician: 3/11/22     Progress Report: []  Yes  [x]  No     Functional Scale: LEFS = 67% disability Date: 3/3/22    Date Range for reporting period:  Beginning:  3/3/22  Endin/3/22    Progress report due (10 Rx/or 30 days whichever is less): 88     Recertification due (POC duration/ or 90 days whichever is less): 5/3/22     Visit # Insurance Allowable Auth Needed   8 30 []Yes    []No     Pain level:  1/10 at most     SUBJECTIVE:  Pt reports knee doing well overall. Pt feels like extension ROM is improving. Knee was swollen yesterday after being on his feet and walking around mall to dinner.       OBJECTIVE: s/p 4+ weeks    Observation:    Test measurements:     3/15: steristrips intact, no drainage or overt s/sx of infection   3/28/22: L knee ext AROM = lacking 5 at beginning of session, 0 with PROM/OP; L knee flex AAROM = 113    RESTRICTIONS/PRECAUTIONS: s/p L ACLR w/ quad tendon allograft and partial lateral meniscectomy (22), hx of L ACLR (10 years ago)    Exercises/Interventions:     Therapeutic Ex 20' Resistance Sets/sec Reps Notes   Recumbent bike5'   Prone hang  3' Quad sets    1  10\" 10  10 W/o NMES   See below   Supine heel slide with strap    1   15      TKE Blue 210     S/L SLR abd  Prone hip extension    2  2 10  10    Prone HS curl   2 10    HS stretch at EOB  Supine HS stretch w/ strap  30\"  10\" 3  10      IB calf stretch    30\"   3    HEP   Supine SLR flex  2 10                           Therapeutic Activities                                                         Manual Intervention 15'       Knee mobs/PROM 8'  Gr II-III Ext   Tib/Fem Mobs       Patella Mobs 2'   Inf, sup, M/L   Ankle mobs       IASTM in prone hang position 5'             NMR re-education 15'       Pakistani/NMES 10'   10\" on/10\" off  5' with quad sets, 5' with TKE blue   Tandem stance ground 30\" 3                                                         Therapeutic Exercise and NMR EXR  [x] (88674) Provided verbal/tactile cueing for activities related to strengthening, flexibility, endurance, ROM for improvements in LE, proximal hip, and core control with self care, mobility, lifting, ambulation.  [] (46010) Provided verbal/tactile cueing for activities related to improving balance, coordination, kinesthetic sense, posture, motor skill, proprioception  to assist with LE, proximal hip, and core control in self care, mobility, lifting, ambulation and eccentric single leg control.      NMR and Therapeutic Activities:    [x] (95611 or 79417) Provided verbal/tactile cueing for activities related to improving balance, coordination, kinesthetic sense, posture, motor skill, proprioception and motor activation to allow for proper function of core, proximal hip and LE with self care and ADLs  [] (92945) Gait Re-education- Provided training and instruction to the patient for proper LE, core and proximal hip recruitment and positioning and eccentric body weight control with ambulation re-education including up and down stairs     Home Exercise Program:    [x] (81791) Reviewed/Progressed HEP activities related to strengthening, flexibility, endurance, ROM of core, proximal hip and LE for functional self-care, mobility, lifting and ambulation/stair navigation   [] (31376)Reviewed/Progressed HEP activities related to improving balance, coordination, kinesthetic sense, posture, motor skill, proprioception of core, proximal hip and LE for self care, mobility, lifting, and ambulation/stair navigation      Manual Treatments:  PROM / STM / Oscillations-Mobs:  G-I, II, III, IV (PA's, Inf., Post.)  [x] (21694) Provided manual therapy to mobilize LE, proximal hip and/or LS spine soft tissue/joints for the purpose of modulating pain, promoting relaxation,  increasing ROM, reducing/eliminating soft tissue swelling/inflammation/restriction, improving soft tissue extensibility and allowing for proper ROM for normal function with self care, mobility, lifting and ambulation. Modalities:  10' game-ready    Charges:  Timed Code Treatment Minutes: 48   Total Treatment Minutes: 63       [] EVAL (LOW) 06854 (typically 20 minutes face-to-face)  [] EVAL (MOD) 32310 (typically 30 minutes face-to-face)  [] EVAL (HIGH) 99433 (typically 45 minutes face-to-face)  [] RE-EVAL     [x] VD(85697) x 1    [] IONTO (64434)  [x] NMR (21598) x 1    [] VASO (15990) x   [x] Manual (54969) x 1    [] Other:  [] TA (71728)x     [] Mech Traction (60926)  [] ES(attended) (97359)     [] ES (un) (02299): If BWC Please Indicate Time In/Out and Total Minutes  CPT Code Time in Time out Total Min                                           GOALS:  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 Progressing: []? Met: []? Not Met: []? Adjusted     Therapist goals for Patient:   Short Term Goals: To be achieved in: 2 weeks  1. Independent in HEP and progression per patient tolerance, in order to prevent re-injury. []? Progressing: []? Met: []? Not Met: []? Adjusted  2.  Patient will have a decrease in pain to facilitate improvement in movement, function, and ADLs as indicated by Functional Deficits. []? Progressing: []? Met: []? Not Met: []? Adjusted     Long Term Goals: To be achieved in: 8 weeks  1. Disability index score of 25% or less for the LEFS to assist with reaching prior level of function. []? Progressing: []? Met: []? Not Met: []? Adjusted  2. Patient will demonstrate increased AROM to WNL to allow for proper joint functioning as indicated by patients Functional Deficits. []? Progressing: []? Met: []? Not Met: []? Adjusted  3. Patient will demonstrate an increase in Strength to good proximal hip strength and control, within 5lb HHD in LE to allow for proper functional mobility as indicated by patients Functional Deficits. []? Progressing: []? Met: []? Not Met: []? Adjusted  4. Patient will return to 15+ minutes of ambulation without increased symptoms or restriction to return to PLOF. []? Progressing: []? Met: []? Not Met: []? Adjusted  5. Patient will tolerate 25+ minutes of sitting without increased symptoms or restriction to return to PLOF. []? Progressing: []? Met: []? Not Met: []? Adjusted       Progression Towards Functional goals:  [] Patient is progressing as expected towards functional goals listed. [] Progression is slowed due to complexities listed. [] Progression has been slowed due to co-morbidities. [x] Plan just implemented, too soon to assess goals progression  [] Other:     ASSESSMENT:  Pt lacks terminal knee extension at beginning of session, improved after manual therapy and addition of prone hang with IASTM. Pt requires cues for appropriate quad activation with SLR flexion. Continue skilled PT to reduce pain, increase ROM, enhance quad activation, and improve LE/hip strength within protocol to return to PLOF.      Return to Play: (if applicable)   []  Stage 1: Intro to Strength   []  Stage 2: Return to Run and Strength   []  Stage 3: Return to Jump and Strength   []  Stage 4: Dynamic Strength and Agility   []  Stage 5: Sport Specific Training     []  Ready to Return to Play, [employer]   []  Not Ready for Return to Sports   Comments:            Treatment/Activity Tolerance:  [x] Patient tolerated treatment well [] Patient limited by fatique  [] Patient limited by pain  [] Patient limited by other medical complications  [] Other:     Overall Progression Towards Functional goals/ Treatment Progress Update:  [] Patient is progressing as expected towards functional goals listed. [] Progression is slowed due to complexities/Impairments listed. [] Progression has been slowed due to co-morbidities. [x] Plan just implemented, too soon to assess goals progression <30days   [] Goals require adjustment due to lack of progress  [] Patient is not progressing as expected and requires additional follow up with physician  [] Other    Prognosis for POC: [x] Good [] Fair  [] Poor    Patient requires continued skilled intervention: [x] Yes  [] No        PLAN: Decrease pain, increase knee ROM, improve quad contraction, and increase strength per protocol. WBAT w/ B axillary crutches and hinge brace locked in extension. [x] Continue per plan of care [] Alter current plan (see comments)  [] Plan of care initiated [] Hold pending MD visit [] Discharge    Electronically signed by: Megha Hernandez PT     Note: If patient does not return for scheduled/recommended follow up visits, this note will serve as a discharge from care along with the most recent update on progress.

## 2022-03-31 ENCOUNTER — TELEPHONE (OUTPATIENT)
Dept: ORTHOPEDIC SURGERY | Age: 32
End: 2022-03-31

## 2022-03-31 ENCOUNTER — HOSPITAL ENCOUNTER (OUTPATIENT)
Dept: PHYSICAL THERAPY | Age: 32
Setting detail: THERAPIES SERIES
Discharge: HOME | End: 2022-03-31
Payer: COMMERCIAL

## 2022-03-31 PROCEDURE — 97140 MANUAL THERAPY 1/> REGIONS: CPT

## 2022-03-31 PROCEDURE — 97112 NEUROMUSCULAR REEDUCATION: CPT

## 2022-03-31 PROCEDURE — 97110 THERAPEUTIC EXERCISES: CPT

## 2022-03-31 NOTE — FLOWSHEET NOTE
[employer]    Physical Therapy Treatment Note/ Progress Report:     Date:  3/31/2022    Patient Name:  Mami Baxter    :  1990  MRN: [id number]  Medical/Treatment Diagnosis Information:  · Diagnosis: S83.512D (ICD-10-CM) - Rupture of anterior cruciate ligament of left knee, subsequent [id number] (ICD-10-CM) - Tear of lateral meniscus of left knee, current, unspecified tear type, initial encounter  · Treatment Diagnosis: s/p ACLR w/ quad tendon allograft and partial lateral meniscectomy  Insurance/Certification information:  PT Insurance Information: [facility] - $2400 deductible, 80%/20% co-insurance, 30 PT visits per calendar year  Physician Information:  Referring Practitioner: Dr. Yolanda Velázquez of care signed (Y/N):     Date of Patient follow up with Physician: 3/11/22     Progress Report: []  Yes  [x]  No     Functional Scale: LEFS = 67% disability Date: 3/3/22    Date Range for reporting period:  Beginning:  3/3/22  Endin/3/22    Progress report due (10 Rx/or 30 days whichever is less): 3/1/76     Recertification due (POC duration/ or 90 days whichever is less): 5/3/22     Visit # Insurance Allowable Auth Needed   9 30 []Yes    []No     Pain level:  1/10 at most     SUBJECTIVE:  Pt reports that knee is doing well overall. Pt states he has not been relying on crutch too much. Knee feels like he is having an easier time straightening it and using home NMES unit 2x/day.       OBJECTIVE: s/p 4+ weeks    Observation:    Test measurements:     3/15: steristrips intact, no drainage or overt s/sx of infection   3/28/22: L knee ext AROM = lacking 5 at beginning of session, 0 with PROM/OP; L knee flex AAROM = 113    RESTRICTIONS/PRECAUTIONS: s/p L ACLR w/ quad tendon allograft and partial lateral meniscectomy (22), hx of L ACLR (10 years ago)    Exercises/Interventions:     Therapeutic Ex 28' Resistance Sets/sec Reps Notes Recumbent bike5'   Prone hang  3' Quad sets    1  10\" 10  10    Seated heel slide with strap    1   15      TKE 60#210     S/L SLR abd  Prone hip extension   2#  2# 2  2 10  10    Prone TKE  2 10    HS stretch at EOB  Supine HS stretch w/ strap  30\"  10\" 3  10      IB calf stretch    30\"   3    HEP   Supine SLR flex  2 10    Standing 1/3 knee bends  2 10    Wall slides for knee flexion    NPV                   Therapeutic Activities        Forward step ups    NPV                                             Manual Intervention 12'       Knee mobs/PROM 6'  Gr II-III Ext   Tib/Fem Mobs       Patella Mobs 3'   Inf, sup, M/L   Ankle mobs       IASTM in prone hang position 3'             NMR re-education 10'       [place]/NMES 8'   10\" on/10\" off  5' with quad sets, 5' with TKE blue   Tandem stance ground 30\" 3                                                         Therapeutic Exercise and NMR EXR  [x] (89854) Provided verbal/tactile cueing for activities related to strengthening, flexibility, endurance, ROM for improvements in LE, proximal hip, and core control with self care, mobility, lifting, ambulation.  [] (01788) Provided verbal/tactile cueing for activities related to improving balance, coordination, kinesthetic sense, posture, motor skill, proprioception  to assist with LE, proximal hip, and core control in self care, mobility, lifting, ambulation and eccentric single leg control.      NMR and Therapeutic Activities:    [x] (85037 or 18663) Provided verbal/tactile cueing for activities related to improving balance, coordination, kinesthetic sense, posture, motor skill, proprioception and motor activation to allow for proper function of core, proximal hip and LE with self care and ADLs  [] (00414) Gait Re-education- Provided training and instruction to the patient for proper LE, core and proximal hip recruitment and positioning and eccentric body weight control with ambulation re-education including up and down stairs     Home Exercise Program:    [x] (66971) Reviewed/Progressed HEP activities related to strengthening, flexibility, endurance, ROM of core, proximal hip and LE for functional self-care, mobility, lifting and ambulation/stair navigation   [] (65276)Reviewed/Progressed HEP activities related to improving balance, coordination, kinesthetic sense, posture, motor skill, proprioception of core, proximal hip and LE for self care, mobility, lifting, and ambulation/stair navigation      Manual Treatments:  PROM / STM / Oscillations-Mobs:  G-I, II, III, IV (PA's, Inf., Post.)  [x] (52033) Provided manual therapy to mobilize LE, proximal hip and/or LS spine soft tissue/joints for the purpose of modulating pain, promoting relaxation,  increasing ROM, reducing/eliminating soft tissue swelling/inflammation/restriction, improving soft tissue extensibility and allowing for proper ROM for normal function with self care, mobility, lifting and ambulation. Modalities:      Charges:  Timed Code Treatment Minutes: 50   Total Treatment Minutes: 50       [] EVAL (LOW) 74190 (typically 20 minutes face-to-face)  [] EVAL (MOD) 65519 (typically 30 minutes face-to-face)  [] EVAL (HIGH) 77352 (typically 45 minutes face-to-face)  [] RE-EVAL     [x] AO(14005) x 1    [] IONTO (74279)  [x] NMR (46482) x 1    [] VASO (79286) x   [x] Manual (68557) x 1    [] Other:  [] TA (48211)x     [] Mech Traction (28291)  [] ES(attended) (94679)     [] ES (un) (44510): If BW Please Indicate Time In/Out and Total Minutes  CPT Code Time in Time out Total Min                                           GOALS:  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 Progressing: []? Met: []? Not Met: []? Adjusted     Therapist goals for Patient:   Short Term Goals: To be achieved in: 2 weeks  1. Independent in HEP and progression per patient tolerance, in order to prevent re-injury. []? Progressing: []? Met: []? Not Met: []? Adjusted  2.  Patient will have a decrease in pain to facilitate improvement in movement, function, and ADLs as indicated by Functional Deficits. []? Progressing: []? Met: []? Not Met: []? Adjusted     Long Term Goals: To be achieved in: 8 weeks  1. Disability index score of 25% or less for the LEFS to assist with reaching prior level of function. []? Progressing: []? Met: []? Not Met: []? Adjusted  2. Patient will demonstrate increased AROM to WNL to allow for proper joint functioning as indicated by patients Functional Deficits. []? Progressing: []? Met: []? Not Met: []? Adjusted  3. Patient will demonstrate an increase in Strength to good proximal hip strength and control, within 5lb HHD in LE to allow for proper functional mobility as indicated by patients Functional Deficits. []? Progressing: []? Met: []? Not Met: []? Adjusted  4. Patient will return to 15+ minutes of ambulation without increased symptoms or restriction to return to PLOF. []? Progressing: []? Met: []? Not Met: []? Adjusted  5. Patient will tolerate 25+ minutes of sitting without increased symptoms or restriction to return to PLOF. []? Progressing: []? Met: []? Not Met: []? Adjusted       Progression Towards Functional goals:  [] Patient is progressing as expected towards functional goals listed. [] Progression is slowed due to complexities listed. [] Progression has been slowed due to co-morbidities. [x] Plan just implemented, too soon to assess goals progression  [] Other:     ASSESSMENT:  Pt lacks terminal knee extension at beginning of session, improved after manual therapy and addition of prone hang with IASTM. Pt requires cues for appropriate quad activation with SLR flexion. Mild anterior knee pain with quad activation at terminal knee extension. Continue skilled PT to reduce pain, increase ROM, enhance quad activation, and improve LE/hip strength within protocol to return to PLOF.      Return to Play: (if applicable)   []  Stage 1: Intro to Strength   []  Stage 2: Return to Run and Strength   []  Stage 3: Return to Jump and Strength   []  Stage 4: Dynamic Strength and Agility   []  Stage 5: Sport Specific Training     []  Ready to Return to Play, Meets All Above Stages   []  Not Ready for Return to Sports   Comments:            Treatment/Activity Tolerance:  [x] Patient tolerated treatment well [] Patient limited by fatique  [] Patient limited by pain  [] Patient limited by other medical complications  [] Other:     Overall Progression Towards Functional goals/ Treatment Progress Update:  [] Patient is progressing as expected towards functional goals listed. [] Progression is slowed due to complexities/Impairments listed. [] Progression has been slowed due to co-morbidities. [x] Plan just implemented, too soon to assess goals progression <30days   [] Goals require adjustment due to lack of progress  [] Patient is not progressing as expected and requires additional follow up with physician  [] Other    Prognosis for POC: [x] Good [] Fair  [] Poor    Patient requires continued skilled intervention: [x] Yes  [] No        PLAN: Decrease pain, increase knee ROM, improve quad contraction, and increase strength per protocol. WBAT w/ B axillary crutches and hinge brace locked in extension. [x] Continue per plan of care [] Alter current plan (see comments)  [] Plan of care initiated [] Hold pending MD visit [] Discharge    Electronically signed by: Tori Cunningham PT     Note: If patient does not return for scheduled/recommended follow up visits, this note will serve as a discharge from care along with the most recent update on progress.

## 2022-03-31 NOTE — TELEPHONE ENCOUNTER
L/M FOR ERIKA explaining his 4/8/2022 with Deb Lynn PA-C needs to be rescheduled to 4/7/2022 with Dr. Nena Regalado. Advised since I was not able to reach him, I am going to go ahead and r/s him to 4/7/2022 to 8:00am at [facility]. Patient advised to call the office if this appointment is inconvenient for him; however, he needs to see Dr. Nena Regalado at his next follow up appointment.

## 2022-04-05 ENCOUNTER — HOSPITAL ENCOUNTER (OUTPATIENT)
Dept: PHYSICAL THERAPY | Age: 32
Setting detail: THERAPIES SERIES
Discharge: HOME | End: 2022-04-05
Payer: COMMERCIAL

## 2022-04-05 ENCOUNTER — OFFICE VISIT (OUTPATIENT)
Dept: ORTHOPEDIC SURGERY | Age: 32
End: 2022-04-05

## 2022-04-05 VITALS — BODY MASS INDEX: 33.19 KG/M2 | RESPIRATION RATE: 16 BRPM | WEIGHT: 219 LBS | HEIGHT: 68 IN

## 2022-04-05 DIAGNOSIS — S83.512D RUPTURE OF ANTERIOR CRUCIATE LIGAMENT OF LEFT KNEE, SUBSEQUENT ENCOUNTER: Primary | ICD-10-CM

## 2022-04-05 PROCEDURE — 99024 POSTOP FOLLOW-UP VISIT: CPT | Performed by: ORTHOPAEDIC SURGERY

## 2022-04-05 PROCEDURE — 97110 THERAPEUTIC EXERCISES: CPT

## 2022-04-05 PROCEDURE — 97140 MANUAL THERAPY 1/> REGIONS: CPT

## 2022-04-05 NOTE — FLOWSHEET NOTE
[facility]    [facility] Treatment Note/ Progress Report:     Date:  2022    Patient Name:  Coby Whipple    :  1990  MRN: [id number]  Medical/Treatment Diagnosis Information:  · Diagnosis: S83.512D (ICD-10-CM) - Rupture of anterior cruciate ligament of left knee, subsequent [id number] (ICD-10-CM) - Tear of lateral meniscus of left knee, current, unspecified tear type, initial encounter  · Treatment Diagnosis: s/p ACLR w/ quad tendon allograft and partial lateral meniscectomy  Insurance/Certification information:  PT Insurance Information: [facility] - $2400 deductible, 80%/20% co-insurance, 30 PT visits per calendar year  Physician Information:  Referring Practitioner: Dr. Kirstie Charles of care signed (Y/N):     Date of Patient follow up with Physician: 3/11/22     Progress Report: []  Yes  [x]  No     Functional Scale: LEFS = 67% disability Date: 3/3/22    Date Range for reporting period:  Beginning:  3/3/22  Endin/3/22    Progress report due (10 Rx/or 30 days whichever is less): 3/9/78     Recertification due (POC duration/ or 90 days whichever is less): 5/3/22     Visit # Insurance Allowable Auth Needed   10 30 []Yes    []No     Pain level:  0/10     SUBJECTIVE:   Pt reports that knee is doing well overall. Pt states that he went to [facility] [address] this weekend which was a lot more walking, but was pleasantly surprised that he wasn't too swollen or sore afterwards.      OBJECTIVE: s/p 5+ weeks    Observation:    Test measurements:     3/15: steristrips intact, no drainage or overt s/sx of infection   3/28/22: L knee ext AROM = lacking 5 at beginning of session, 0 with PROM/OP; L knee flex AAROM = 113    RESTRICTIONS/PRECAUTIONS: s/p L ACLR w/ quad tendon allograft and partial lateral meniscectomy (22), hx of L ACLR (10 years ago)    Exercises/Interventions:     Therapeutic Ex 32' Resistance Sets/sec Reps Notes Recumbent bike5'   Prone hang  3' w/IASTMQuad sets    1  10\" 10  10 HEP   Seated heel slide with strap    1   15      TKE 60#210     S/L SLR abd  Prone hip extension   2#  2# 2  2 10  10    Prone TKE  2 10    HS stretch at EOB  Supine HS stretch w/ strap  30\"  10\" 3  10      IB calf stretch    30\"   3    HEP   Supine SLR flex  2 10 With BFR   Standing 1/3 knee bends  2 10    Wall slides for knee flexion  5\" 15    BFR  8'  See below  SLR flex, SLR abd, HR, HS curls                   Therapeutic Activities        Forward step ups    NPV                                             Manual Intervention 12'       Knee mobs/PROM 6'  Gr II-III Ext   Tib/Fem Mobs       Patella Mobs 3'   Inf, sup, M/L   Ankle mobs       IASTM in prone hang position 3'             NMR re-education 3'        10\" on/10\" off  5' with quad sets, 5' with TKE blue   SLS ground 15\" 3                                                          [facility] BFR Smart Phase Protocol       Spoke with Dr. Joo Vasquez regarding the use of BFR with patient.     BFR Session 1              Johanna Personalized Tourniquet System for BFR     LE: up to 80% LOP       UE: up to 50% LOP        BFR Location: L thigh  BFR set at: 127 mmHg (60%)              Protocol: 30/15/15/15       Exercises:   Reps 30 sec Notes           SLR flex # of reps required  30 Rest     completed  30 + 12     SLR abd reps required  15 Rest     completed  15     HR reps required  15 Rest     completed  15     HS curls reps required  15 Rest     completed  15             Therapeutic Exercise and NMR EXR  [x] (85674) Provided verbal/tactile cueing for activities related to strengthening, flexibility, endurance, ROM for improvements in LE, proximal hip, and core control with self care, mobility, lifting, ambulation.  [] (30051) Provided verbal/tactile cueing for activities related to improving balance, coordination, kinesthetic sense, posture, motor skill, proprioception  to assist with LE, proximal hip, and core control in self care, mobility, lifting, ambulation and eccentric single leg control. NMR and Therapeutic Activities:    [x] (92720 or 72720) Provided verbal/tactile cueing for activities related to improving balance, coordination, kinesthetic sense, posture, motor skill, proprioception and motor activation to allow for proper function of core, proximal hip and LE with self care and ADLs  [] (16398) Gait Re-education- Provided training and instruction to the patient for proper LE, core and proximal hip recruitment and positioning and eccentric body weight control with ambulation re-education including up and down stairs     Home Exercise Program:    [x] (74055) Reviewed/Progressed HEP activities related to strengthening, flexibility, endurance, ROM of core, proximal hip and LE for functional self-care, mobility, lifting and ambulation/stair navigation   [] (74534)Reviewed/Progressed HEP activities related to improving balance, coordination, kinesthetic sense, posture, motor skill, proprioception of core, proximal hip and LE for self care, mobility, lifting, and ambulation/stair navigation      Manual Treatments:  PROM / STM / Oscillations-Mobs:  G-I, II, III, IV (PA's, Inf., Post.)  [x] (15199) Provided manual therapy to mobilize LE, proximal hip and/or LS spine soft tissue/joints for the purpose of modulating pain, promoting relaxation,  increasing ROM, reducing/eliminating soft tissue swelling/inflammation/restriction, improving soft tissue extensibility and allowing for proper ROM for normal function with self care, mobility, lifting and ambulation.      Modalities:      Charges:  Timed Code Treatment Minutes: 44   Total Treatment Minutes: 44       [] EVAL (LOW) 49154 (typically 20 minutes face-to-face)  [] EVAL (MOD) 24237 (typically 30 minutes face-to-face)  [] EVAL (HIGH) 57301 (typically 45 minutes face-to-face)  [] RE-EVAL     [x] UN(16568) x 2    [] IONTO (58526)  [] NMR (83006) x     [] VASO (82439) x   [x] Manual (50023) x 1    [] Other:  [] TA (42233)x     [] Mech Traction (12594)  [] ES(attended) (29225)     [] ES (un) (24443): If BWC Please Indicate Time In/Out and Total Minutes  CPT Code Time in Time out Total Min                                           GOALS:  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 Progressing: []? Met: []? Not Met: []? Adjusted     Therapist goals for Patient:   Short Term Goals: To be achieved in: 2 weeks  1. Independent in HEP and progression per patient tolerance, in order to prevent re-injury. []? Progressing: []? Met: []? Not Met: []? Adjusted  2. Patient will have a decrease in pain to facilitate improvement in movement, function, and ADLs as indicated by Functional Deficits. []? Progressing: []? Met: []? Not Met: []? Adjusted     Long Term Goals: To be achieved in: 8 weeks  1. Disability index score of 25% or less for the LEFS to assist with reaching prior level of function. []? Progressing: []? Met: []? Not Met: []? Adjusted  2. Patient will demonstrate increased AROM to WNL to allow for proper joint functioning as indicated by patients Functional Deficits. []? Progressing: []? Met: []? Not Met: []? Adjusted  3. Patient will demonstrate an increase in Strength to good proximal hip strength and control, within 5lb HHD in LE to allow for proper functional mobility as indicated by patients Functional Deficits. []? Progressing: []? Met: []? Not Met: []? Adjusted  4. Patient will return to 15+ minutes of ambulation without increased symptoms or restriction to return to PLOF. []? Progressing: []? Met: []? Not Met: []? Adjusted  5. Patient will tolerate 25+ minutes of sitting without increased symptoms or restriction to return to PLOF. []? Progressing: []? Met: []? Not Met: []? Adjusted       Progression Towards Functional goals:  [] Patient is progressing as expected towards functional goals listed.     [] Progression is slowed due to complexities listed. [] Progression has been slowed due to co-morbidities. [x] Plan just implemented, too soon to assess goals progression  [] Other:     ASSESSMENT:  Pt was able to attain terminal knee extension AROM after manual therapy today. Added BFR today with good initial results noted. Also added SLS and wall slides for knee flexion with no increased knee pain noted. Improved quad activation noted today. Continue skilled PT to reduce pain, increase ROM, enhance quad activation, and improve LE/hip strength within protocol to return to PLOF. Return to Play: (if applicable)   []  Stage 1: Intro to Strength   []  Stage 2: Return to Run and Strength   []  Stage 3: Return to Jump and Strength   []  Stage 4: Dynamic Strength and Agility   []  Stage 5: Sport Specific Training     []  Ready to Return to Play, Meets All Above Stages   []  Not Ready for Return to Sports   Comments:            Treatment/Activity Tolerance:  [x] Patient tolerated treatment well [] Patient limited by fatique  [] Patient limited by pain  [] Patient limited by other medical complications  [] Other:     Overall Progression Towards Functional goals/ Treatment Progress Update:  [] Patient is progressing as expected towards functional goals listed. [] Progression is slowed due to complexities/Impairments listed. [] Progression has been slowed due to co-morbidities. [x] Plan just implemented, too soon to assess goals progression <30days   [] Goals require adjustment due to lack of progress  [] Patient is not progressing as expected and requires additional follow up with physician  [] Other    Prognosis for POC: [x] Good [] Fair  [] Poor    Patient requires continued skilled intervention: [x] Yes  [] No        PLAN: Decrease pain, increase knee ROM, improve quad contraction, and increase strength per protocol.    [x] Continue per plan of care [] Alter current plan (see comments)  [] Plan of care initiated [] Hold pending MD visit [] Discharge    Electronically signed by: Catalina Trujillo PT     Note: If patient does not return for scheduled/recommended follow up visits, this note will serve as a discharge from care along with the most recent update on progress.

## 2022-04-07 ENCOUNTER — HOSPITAL ENCOUNTER (OUTPATIENT)
Dept: PHYSICAL THERAPY | Age: 32
Setting detail: THERAPIES SERIES
Discharge: HOME | End: 2022-04-07
Payer: COMMERCIAL

## 2022-04-07 PROCEDURE — 97140 MANUAL THERAPY 1/> REGIONS: CPT

## 2022-04-07 PROCEDURE — 97110 THERAPEUTIC EXERCISES: CPT

## 2022-04-07 NOTE — FLOWSHEET NOTE
[employer]    Physical Therapy Treatment Note/ Progress Report:     Date:  2022    Patient Name:  Edith Tello    :  1990  MRN: [id number]  Medical/Treatment Diagnosis Information:  · Diagnosis: S83.512D (ICD-10-CM) - Rupture of anterior cruciate ligament of left knee, subsequent [id number] (ICD-10-CM) - Tear of lateral meniscus of left knee, current, unspecified tear type, initial encounter  · Treatment Diagnosis: s/p ACLR w/ quad tendon allograft and partial lateral meniscectomy  Insurance/Certification information:  PT Insurance Information: [facility] - $2400 deductible, 80%/20% co-insurance, 30 PT visits per calendar year  Physician Information:  Referring Practitioner: Dr. Villa Velez of care signed (Y/N):     Date of Patient follow up with Physician: 3/11/22     Progress Report: []  Yes  [x]  No     Functional Scale: LEFS = 67% disability Date: 3/3/22    Date Range for reporting period:  Beginning:  3/3/22  Endin/3/22    Progress report due (10 Rx/or 30 days whichever is less): 53     Recertification due (POC duration/ or 90 days whichever is less): 5/3/22     Visit # Insurance Allowable Auth Needed    30 []Yes    []No     Pain level:  0/10     SUBJECTIVE:  Pt reports that knee is doing well overall. Pt reports his lateral thigh has been a little sore though.      OBJECTIVE: s/p 6 weeks tomorrow    Observation:    Test measurements:     3/15: steristrips intact, no drainage or overt s/sx of infection   3/28/22: L knee ext AROM = lacking 5 at beginning of session, 0 with PROM/OP; L knee flex AAROM = 113   22: L knee flex AAROM = 124 (w/wall slides)    RESTRICTIONS/PRECAUTIONS: s/p L ACLR w/ quad tendon allograft and partial lateral meniscectomy (22), hx of L ACLR (10 years ago)    Exercises/Interventions:     Therapeutic Ex 27' Resistance Sets/sec Reps Notes   Recumbent bike5'   Prone hang  3' w/IASTMQuad sets    1  10\" 10  10 HEP   Seated heel slide with strap    1   15      TKE 60#210     S/L SLR abd  Prone hip extension   2#  2# 2  2 10  10    Prone TKE  2 10    HS stretch at EOB  Supine HS stretch w/ strap  30\"  10\" 3  10      IB calf stretch    30\"   3    HEP   Supine SLR flex  2 10 With BFR   Standing 1/3 knee bends  2 10    Wall slides for knee flexion  5\" 15    BFR  8'  See below  SLR flex, SLR abd, HR, HS curls   Wall sits to 30 degrees  w/BS 30\" 3             Therapeutic Activities 5'        Forward step ups 4\" 2 10                                              Manual Intervention 12'       Knee mobs/PROM 4'  Gr II-III Ext   Tib/Fem Mobs       Patella Mobs 3'   Inf, sup, M/L   Ankle mobs       IASTM in prone hang position 5'   Distal HS, distal ITB          NMR re-education 6'        10\" on/10\" off  5' with quad sets, 5' with TKE blue   SLS ground 15\" 3    Gait training 3'                                                     [facility] BFR Smart Phase Protocol       Spoke with Dr. Joel Hsu regarding the use of BFR with patient.     BFR Session 2              [facility] Personalized Tourniquet System for BFR     LE: up to 80% LOP       UE: up to 50% LOP        BFR Location: L thigh  BFR set at: 121 mmHg (70%)              Protocol: 30/15/15/15       Exercises:   Reps 30 sec Notes           SLR flex # of reps required  30 Rest     completed  30 + 20     SLR abd reps required  15 Rest     completed  15     HR reps required  15 Rest     completed  15     HS curls reps required  15 Rest     completed  15             Therapeutic Exercise and NMR EXR  [x] (04749) Provided verbal/tactile cueing for activities related to strengthening, flexibility, endurance, ROM for improvements in LE, proximal hip, and core control with self care, mobility, lifting, ambulation.  [] (89284) Provided verbal/tactile cueing for activities related to improving balance, coordination, kinesthetic sense, posture, motor skill, proprioception to assist with LE, proximal hip, and core control in self care, mobility, lifting, ambulation and eccentric single leg control. NMR and Therapeutic Activities:    [x] (63156 or 45398) Provided verbal/tactile cueing for activities related to improving balance, coordination, kinesthetic sense, posture, motor skill, proprioception and motor activation to allow for proper function of core, proximal hip and LE with self care and ADLs  [] (09237) Gait Re-education- Provided training and instruction to the patient for proper LE, core and proximal hip recruitment and positioning and eccentric body weight control with ambulation re-education including up and down stairs     Home Exercise Program:    [x] (32314) Reviewed/Progressed HEP activities related to strengthening, flexibility, endurance, ROM of core, proximal hip and LE for functional self-care, mobility, lifting and ambulation/stair navigation   [] (42743)Reviewed/Progressed HEP activities related to improving balance, coordination, kinesthetic sense, posture, motor skill, proprioception of core, proximal hip and LE for self care, mobility, lifting, and ambulation/stair navigation      Manual Treatments:  PROM / STM / Oscillations-Mobs:  G-I, II, III, IV (PA's, Inf., Post.)  [x] (16728) Provided manual therapy to mobilize LE, proximal hip and/or LS spine soft tissue/joints for the purpose of modulating pain, promoting relaxation,  increasing ROM, reducing/eliminating soft tissue swelling/inflammation/restriction, improving soft tissue extensibility and allowing for proper ROM for normal function with self care, mobility, lifting and ambulation.      Modalities:      Charges:  Timed Code Treatment Minutes: 50   Total Treatment Minutes: 50       [] EVAL (LOW) 20330 (typically 20 minutes face-to-face)  [] EVAL (MOD) 56088 (typically 30 minutes face-to-face)  [] EVAL (HIGH) 77160 (typically 45 minutes face-to-face)  [] RE-EVAL     [x] EW(87542) x 2    [] IONTO (33942)  [] NMR (29930) x     [] VASO (86521) x   [x] Manual (88656) x 1    [] Other:  [] TA (02657)x     [] Mech Traction (26930)  [] ES(attended) (69029)     [] ES (un) (97394): If BWC Please Indicate Time In/Out and Total Minutes  CPT Code Time in Time out Total Min                                           GOALS:  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 Progressing: []? Met: []? Not Met: []? Adjusted     Therapist goals for Patient:   Short Term Goals: To be achieved in: 2 weeks  1. Independent in HEP and progression per patient tolerance, in order to prevent re-injury. []? Progressing: []? Met: []? Not Met: []? Adjusted  2. Patient will have a decrease in pain to facilitate improvement in movement, function, and ADLs as indicated by Functional Deficits. []? Progressing: []? Met: []? Not Met: []? Adjusted     Long Term Goals: To be achieved in: 8 weeks  1. Disability index score of 25% or less for the LEFS to assist with reaching prior level of function. []? Progressing: []? Met: []? Not Met: []? Adjusted  2. Patient will demonstrate increased AROM to WNL to allow for proper joint functioning as indicated by patients Functional Deficits. []? Progressing: []? Met: []? Not Met: []? Adjusted  3. Patient will demonstrate an increase in Strength to good proximal hip strength and control, within 5lb HHD in LE to allow for proper functional mobility as indicated by patients Functional Deficits. []? Progressing: []? Met: []? Not Met: []? Adjusted  4. Patient will return to 15+ minutes of ambulation without increased symptoms or restriction to return to PLOF. []? Progressing: []? Met: []? Not Met: []? Adjusted  5. Patient will tolerate 25+ minutes of sitting without increased symptoms or restriction to return to PLOF. []? Progressing: []? Met: []? Not Met: []? Adjusted       Progression Towards Functional goals:  [] Patient is progressing as expected towards functional goals listed. [] Progression is slowed due to complexities listed. [] Progression has been slowed due to co-morbidities. [x] Plan just implemented, too soon to assess goals progression  [] Other:     ASSESSMENT:  TTP and tightness noted along distal ITB. Improved knee extension AROM after performing IASTM to distal ITB and distal hamstrings. Progressed quad strengthening per protocol today with fatigue noted, no increased knee pain. Continue skilled PT to reduce pain, increase ROM, enhance quad activation, and improve LE/hip strength within protocol to return to PLOF. Return to Play: (if applicable)   []  Stage 1: Intro to Strength   []  Stage 2: Return to Run and Strength   []  Stage 3: Return to Jump and Strength   []  Stage 4: Dynamic Strength and Agility   []  Stage 5: Sport Specific Training     []  Ready to Return to Play, Meets All Above Stages   []  Not Ready for Return to Sports   Comments:            Treatment/Activity Tolerance:  [x] Patient tolerated treatment well [] Patient limited by fatique  [] Patient limited by pain  [] Patient limited by other medical complications  [] Other:     Overall Progression Towards Functional goals/ Treatment Progress Update:  [] Patient is progressing as expected towards functional goals listed. [] Progression is slowed due to complexities/Impairments listed. [] Progression has been slowed due to co-morbidities. [x] Plan just implemented, too soon to assess goals progression <30days   [] Goals require adjustment due to lack of progress  [] Patient is not progressing as expected and requires additional follow up with physician  [] Other    Prognosis for POC: [x] Good [] Fair  [] Poor    Patient requires continued skilled intervention: [x] Yes  [] No        PLAN: Decrease pain, increase knee ROM, improve quad contraction, and increase strength per protocol.    [x] Continue per plan of care [] Alter current plan (see comments)  [] Plan of care initiated [] Hold pending MD visit [] Discharge    Electronically signed by: Tania De La Vega, PT     Note: If patient does not return for scheduled/recommended follow up visits, this note will serve as a discharge from care along with the most recent update on progress.

## 2022-04-11 ENCOUNTER — HOSPITAL ENCOUNTER (OUTPATIENT)
Dept: PHYSICAL THERAPY | Age: 32
Setting detail: THERAPIES SERIES
Discharge: HOME | End: 2022-04-11
Payer: COMMERCIAL

## 2022-04-11 PROCEDURE — 97110 THERAPEUTIC EXERCISES: CPT

## 2022-04-11 PROCEDURE — 97112 NEUROMUSCULAR REEDUCATION: CPT

## 2022-04-11 NOTE — FLOWSHEET NOTE
[employer]    Physical Therapy Treatment Note/ Progress Report:     Date:  2022    Patient Name:  Coby Whipple    :  1990  MRN: [id number]  Medical/Treatment Diagnosis Information:  · Diagnosis: S83.512D (ICD-10-CM) - Rupture of anterior cruciate ligament of left knee, subsequent [id number] (ICD-10-CM) - Tear of lateral meniscus of left knee, current, unspecified tear type, initial encounter  · Treatment Diagnosis: s/p ACLR w/ quad tendon allograft and partial lateral meniscectomy  Insurance/Certification information:  PT Insurance Information: [facility] - $2400 deductible, 80%/20% co-insurance, 30 PT visits per calendar year  Physician Information:  Referring Practitioner: Dr. Kirstie Charles of care signed (Y/N):     Date of Patient follow up with Physician: 3/11/22     Progress Report: []  Yes  [x]  No     Functional Scale: LEFS = 67% disability Date: 3/3/22    Date Range for reporting period:  Beginning:  3/3/22  Endin/3/22    Progress report due (10 Rx/or 30 days whichever is less): 88     Recertification due (POC duration/ or 90 days whichever is less): 5/3/22     Visit # Insurance Allowable Auth Needed   12 30 []Yes    []No     Pain level:  0/10     SUBJECTIVE:  Pt reports knee doing well overall. He was on his feet for longer than he expected yesterday fixing a hot water heater and had a little increased soreness afterwards.      OBJECTIVE: s/p 6+ weeks    Observation:    Test measurements:     3/15: steristrips intact, no drainage or overt s/sx of infection   3/28/22: L knee ext AROM = lacking 5 at beginning of session, 0 with PROM/OP; L knee flex AAROM = 113   22: L knee flex AAROM = 124 (w/wall slides)    RESTRICTIONS/PRECAUTIONS: s/p L ACLR w/ quad tendon allograft and partial lateral meniscectomy (22), hx of L ACLR (10 years ago)    Exercises/Interventions:     Therapeutic Ex 29' Resistance Sets/sec Reps Notes   Recumbent bike5'   Prone hang  3' w/IASTMQuad sets    1  10\" 10  10 HEP   Seated heel slide with strap    1   15      TKE 60#210     S/L SLR abd  Prone hip extension   2#  2# 2  2 10  10    Prone TKE  2 10    HS stretch at EOB  Supine HS stretch w/ strap  30\"  10\" 3  10    Bridges  2 10 Prone quad stretch  30\" 3      IB calf stretch    30\"   3    HEP   Supine SLR flex  2 10 With BFR   Standing 1/3 knee bends  2 10 With BFR   Wall slides for knee flexion  5\" 15    BFR  8'  See below  SLR flex, SLR abd, mini squats, HS curls   Wall sits to 30 degrees  w/BS 30\" 3             Therapeutic Activities 5'        Forward step ups 4\" 2 10                                              Manual Intervention 6'       Knee mobs/PROM 3'  Gr II-III Ext   Tib/Fem Mobs       Patella Mobs 3'   Inf, sup, M/L   Ankle mobs         Distal HS, distal ITB   Progress note NPV       NMR re-education 10'        10\" on/10\" off  5' with quad sets, 5' with TKE blue   SLS ground 20\" 3    Gait training 3'      Lateral stepping teal 1 2 laps Band around ankles   TM push 3'   To normalize gait pattern                                    [facility] BFR Smart Phase Protocol       Spoke with Dr. Sylvia Lopez regarding the use of BFR with patient.     BFR Session 2              [facility] Personalized Tourniquet System for BFR     LE: up to 80% LOP       UE: up to 50% LOP        BFR Location: L thigh  BFR set at: 121 mmHg (70%)              Protocol: 30/15/15/15       Exercises:   Reps 30 sec Notes           SLR flex # of reps required  30 Rest     completed  30 + 26     SLR abd reps required  15 Rest     completed  15     Mini squats reps required  15 Rest     completed  15     HS curls reps required  15 Rest     completed  15             Therapeutic Exercise and NMR EXR  [x] (57923) Provided verbal/tactile cueing for activities related to strengthening, flexibility, endurance, ROM for improvements in LE, proximal hip, and core control with self care, mobility, lifting, ambulation.  [] (17082) Provided verbal/tactile cueing for activities related to improving balance, coordination, kinesthetic sense, posture, motor skill, proprioception  to assist with LE, proximal hip, and core control in self care, mobility, lifting, ambulation and eccentric single leg control. NMR and Therapeutic Activities:    [x] (10026 or 07049) Provided verbal/tactile cueing for activities related to improving balance, coordination, kinesthetic sense, posture, motor skill, proprioception and motor activation to allow for proper function of core, proximal hip and LE with self care and ADLs  [] (82550) Gait Re-education- Provided training and instruction to the patient for proper LE, core and proximal hip recruitment and positioning and eccentric body weight control with ambulation re-education including up and down stairs     Home Exercise Program:    [x] (30091) Reviewed/Progressed HEP activities related to strengthening, flexibility, endurance, ROM of core, proximal hip and LE for functional self-care, mobility, lifting and ambulation/stair navigation   [] (60864)Reviewed/Progressed HEP activities related to improving balance, coordination, kinesthetic sense, posture, motor skill, proprioception of core, proximal hip and LE for self care, mobility, lifting, and ambulation/stair navigation      Manual Treatments:  PROM / STM / Oscillations-Mobs:  G-I, II, III, IV (PA's, Inf., Post.)  [x] (96738) Provided manual therapy to mobilize LE, proximal hip and/or LS spine soft tissue/joints for the purpose of modulating pain, promoting relaxation,  increasing ROM, reducing/eliminating soft tissue swelling/inflammation/restriction, improving soft tissue extensibility and allowing for proper ROM for normal function with self care, mobility, lifting and ambulation.      Modalities:      Charges:  Timed Code Treatment Minutes: 50   Total Treatment Minutes: 50       [] EVAL (LOW) 51165 (typically 20 minutes face-to-face)  [] EVAL (MOD) 33911 (typically 30 minutes face-to-face)  [] EVAL (HIGH) 87727 (typically 45 minutes face-to-face)  [] RE-EVAL     [x] GX(82275) x 2    [] IONTO (33804)  [x] NMR (26481) x  1   [] VASO (81048) x   [] Manual (36334) x     [] Other:  [] TA (67352)x     [] Mech Traction (24959)  [] ES(attended) (25172)     [] ES (un) (35597): If BWC Please Indicate Time In/Out and Total Minutes  CPT Code Time in Time out Total Min                                           GOALS:  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 Progressing: []? Met: []? Not Met: []? Adjusted     Therapist goals for Patient:   Short Term Goals: To be achieved in: 2 weeks  1. Independent in HEP and progression per patient tolerance, in order to prevent re-injury. []? Progressing: []? Met: []? Not Met: []? Adjusted  2. Patient will have a decrease in pain to facilitate improvement in movement, function, and ADLs as indicated by Functional Deficits. []? Progressing: []? Met: []? Not Met: []? Adjusted     Long Term Goals: To be achieved in: 8 weeks  1. Disability index score of 25% or less for the LEFS to assist with reaching prior level of function. []? Progressing: []? Met: []? Not Met: []? Adjusted  2. Patient will demonstrate increased AROM to WNL to allow for proper joint functioning as indicated by patients Functional Deficits. []? Progressing: []? Met: []? Not Met: []? Adjusted  3. Patient will demonstrate an increase in Strength to good proximal hip strength and control, within 5lb HHD in LE to allow for proper functional mobility as indicated by patients Functional Deficits. []? Progressing: []? Met: []? Not Met: []? Adjusted  4. Patient will return to 15+ minutes of ambulation without increased symptoms or restriction to return to PLOF. []? Progressing: []? Met: []? Not Met: []? Adjusted  5.  Patient will tolerate 25+ minutes of sitting without increased symptoms or restriction to return to pain, increase knee ROM, improve quad contraction, and increase strength per protocol. [x] Continue per plan of care [] Alter current plan (see comments)  [] Plan of care initiated [] Hold pending MD visit [] Discharge    Electronically signed by: Zeb Kidd PT     Note: If patient does not return for scheduled/recommended follow up visits, this note will serve as a discharge from care along with the most recent update on progress.

## 2022-04-13 ENCOUNTER — HOSPITAL ENCOUNTER (OUTPATIENT)
Dept: PHYSICAL THERAPY | Age: 32
Setting detail: THERAPIES SERIES
Discharge: HOME | End: 2022-04-13
Payer: COMMERCIAL

## 2022-04-13 PROCEDURE — 97110 THERAPEUTIC EXERCISES: CPT

## 2022-04-13 PROCEDURE — 97112 NEUROMUSCULAR REEDUCATION: CPT

## 2022-04-13 NOTE — PROGRESS NOTES
[employer]    Physical Therapy Treatment Note/ Progress Report:     Date:  2022    Patient Name:  Catracho Briscoe    :  1990  MRN: [id number]  Medical/Treatment Diagnosis Information:  · Diagnosis: S83.512D (ICD-10-CM) - Rupture of anterior cruciate ligament of left knee, subsequent [id number] (ICD-10-CM) - Tear of lateral meniscus of left knee, current, unspecified tear type, initial encounter  · Treatment Diagnosis: s/p ACLR w/ quad tendon allograft and partial lateral meniscectomy  Insurance/Certification information:  PT Insurance Information: [facility] - $2400 deductible, 80%/20% co-insurance, 30 PT visits per calendar year  Physician Information:  Referring Practitioner: Dr. Joe Davenport of care signed (Y/N):     Date of Patient follow up with Physician: 3/11/22     Progress Report: [x]  Yes  []  No     Functional Scale: LEFS = 67% disability Date: 3/3/22     LEFS = 42% disability  22    Date Range for reporting period:  Beginning:  3/3/22  Endin22    Progress report due (10 Rx/or 30 days whichever is less): 04     Recertification due (POC duration/ or 90 days whichever is less): 5/3/22     Visit # Insurance Allowable Auth Needed   13 30 []Yes    []No     Pain level:  0/10     SUBJECTIVE:  Pt reports that his knee is doing well overall. Pt reports that he has been focusing on walking with more normal gait pattern, which is becoming more comfortable now.      OBJECTIVE: s/p 6+ weeks    Observation:    Test measurements:     3/15: steristrips intact, no drainage or overt s/sx of infection   3/28/22: L knee ext AROM = lacking 5 at beginning of session, 0 with PROM/OP; L knee flex AAROM = 113   22: L knee flex AAROM = 124 (w/wall slides)   22: L knee ext AROM = lacking 1 at rest, 0 with QS; flex AAROM = 135 (w/wall slides)   LEFS = 46/80 = 42% disability    RESTRICTIONS/PRECAUTIONS: s/p L ACLR w/ quad tendon allograft and partial lateral meniscectomy (2/25/22), hx of L ACLR (10 years ago)    Exercises/Interventions:     Therapeutic Ex 29' Resistance Sets/sec Reps Notes   Recumbent bike5'   w/IASTMQuad sets    1  10\" 10  10 HEP   Seated heel slide with strap    1   15      TKE 60#210     S/L SLR abd  Prone hip extension   2#  2# 2  2 10  10    Prone TKE  2 10    HS stretch at EOB  30\" 3    Bridges  2 10 Prone quad stretch  30\" 3      IB calf stretch    30\"   3    HEP   Supine SLR flex  2 10 With BFR   Standing 1/3 knee bends  2 10 With BFR   Wall slides for knee flexion  5\" 15    BFR  8'  See below  SLR flex, SLR abd, mini squats, HS curls   Wall sits to 30 degrees  w/BS 30\" 3             Therapeutic Activities 5'        Forward step ups 4\"/6\" 1 15 10 reps 4\", 5 reps 6\"                                             Manual Intervention 6'       Knee mobs/PROM 3'  Gr II-III Ext   Tib/Fem Mobs       Patella Mobs 3'   Inf, sup, M/L   Ankle mobs         Distal HS, distal ITB          NMR re-education 10'        10\" on/10\" off  5' with quad sets, 5' with TKE blue   SLS ground 20\" 3    Gait training 3'      Lateral stepping teal 1 2 laps Band around ankles   TM push 3'   To normalize gait pattern   Fwd mini bosu lunge iso  3\" 10                              [facility] BFR Smart Phase Protocol       Spoke with Dr. Eri Hsu regarding the use of BFR with patient.     BFR Session 4              [facility] Personalized Tourniquet System for BFR     LE: up to 80% LOP       UE: up to 50% LOP        BFR Location: L thigh  BFR set at: 121 mmHg (70%)              Protocol: 30/15/15/15       Exercises:   Reps 30 sec Notes           SLR flex # of reps required  30 Rest     completed  30 + 26     SLR abd reps required  15 Rest     completed  15     Mini squats reps required  15 Rest     completed  15     HS curls reps required  15 Rest     completed  15             Therapeutic Exercise and NMR EXR  [x] (99331) Provided verbal/tactile cueing for activities related to strengthening, flexibility, endurance, ROM for improvements in LE, proximal hip, and core control with self care, mobility, lifting, ambulation.  [] (65211) Provided verbal/tactile cueing for activities related to improving balance, coordination, kinesthetic sense, posture, motor skill, proprioception  to assist with LE, proximal hip, and core control in self care, mobility, lifting, ambulation and eccentric single leg control.      NMR and Therapeutic Activities:    [x] (86211 or 60482) Provided verbal/tactile cueing for activities related to improving balance, coordination, kinesthetic sense, posture, motor skill, proprioception and motor activation to allow for proper function of core, proximal hip and LE with self care and ADLs  [] (91799) Gait Re-education- Provided training and instruction to the patient for proper LE, core and proximal hip recruitment and positioning and eccentric body weight control with ambulation re-education including up and down stairs     Home Exercise Program:    [x] (78359) Reviewed/Progressed HEP activities related to strengthening, flexibility, endurance, ROM of core, proximal hip and LE for functional self-care, mobility, lifting and ambulation/stair navigation   [] (75422)Reviewed/Progressed HEP activities related to improving balance, coordination, kinesthetic sense, posture, motor skill, proprioception of core, proximal hip and LE for self care, mobility, lifting, and ambulation/stair navigation      Manual Treatments:  PROM / STM / Oscillations-Mobs:  G-I, II, III, IV (PA's, Inf., Post.)  [x] (81313) Provided manual therapy to mobilize LE, proximal hip and/or LS spine soft tissue/joints for the purpose of modulating pain, promoting relaxation,  increasing ROM, reducing/eliminating soft tissue swelling/inflammation/restriction, improving soft tissue extensibility and allowing for proper ROM for normal function with self care, mobility, lifting and ambulation. Modalities:      Charges:  Timed Code Treatment Minutes: 50   Total Treatment Minutes: 50       [] EVAL (LOW) 58109 (typically 20 minutes face-to-face)  [] EVAL (MOD) 52045 (typically 30 minutes face-to-face)  [] EVAL (HIGH) 19501 (typically 45 minutes face-to-face)  [] RE-EVAL     [x] AK(72553) x 2    [] IONTO (18695)  [x] NMR (36084) x  1   [] VASO (85113) x   [] Manual (45285) x     [] Other:  [] TA (91060)x     [] Mech Traction (79165)  [] ES(attended) (55278)     [] ES (un) (33400): If BWC Please Indicate Time In/Out and Total Minutes  CPT Code Time in Time out Total Min                                           GOALS:  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 Progressing: []? Met: []? Not Met: []? Adjusted     Therapist goals for Patient:   Short Term Goals: To be achieved in: 2 weeks  1. Independent in HEP and progression per patient tolerance, in order to prevent re-injury. []? Progressing: [x]? Met: []? Not Met: []? Adjusted  2. Patient will have a decrease in pain to facilitate improvement in movement, function, and ADLs as indicated by Functional Deficits. []? Progressing: [x]? Met: []? Not Met: []? Adjusted     Long Term Goals: To be achieved in: 8 weeks  1. Disability index score of 25% or less for the LEFS to assist with reaching prior level of function. [x]? Progressing: []? Met: []? Not Met: []? Adjusted  2. Patient will demonstrate increased AROM to WNL to allow for proper joint functioning as indicated by patients Functional Deficits. []? Progressing: [x]? Met: []? Not Met: []? Adjusted  3. Patient will demonstrate an increase in Strength to good proximal hip strength and control, within 5lb HHD in LE to allow for proper functional mobility as indicated by patients Functional Deficits. [x]? Progressing: []? Met: []? Not Met: []? Adjusted  4. Patient will return to 15+ minutes of ambulation without increased symptoms or restriction to return to PLOF. [x]? Progressing: []? Met: []? Not Met: []? Adjusted  5. Patient will tolerate 25+ minutes of sitting without increased symptoms or restriction to return to PLOF. [x]? Progressing: []? Met: []? Not Met: []? Adjusted       Progression Towards Functional goals:  [x] Patient is progressing as expected towards functional goals listed. [] Progression is slowed due to complexities listed. [] Progression has been slowed due to co-morbidities. [] Plan just implemented, too soon to assess goals progression  [] Other:     ASSESSMENT:  Pt demonstrates improved knee flexion and extension ROM, showing progress towards LTG's. Pt demonstrated improved gait mechanics with ambulation today, but requires occasional cues to decrease lateral trunk lean with weight-bearing on L LE. Continue skilled PT to reduce pain, increase ROM, enhance quad activation, and improve LE/hip strength within protocol to return to PLOF. Return to Play: (if applicable)   []  Stage 1: Intro to Strength   []  Stage 2: Return to Run and Strength   []  Stage 3: Return to Jump and Strength   []  Stage 4: Dynamic Strength and Agility   []  Stage 5: Sport Specific Training     []  Ready to Return to Play, Meets All Above Stages   []  Not Ready for Return to Sports   Comments:            Treatment/Activity Tolerance:  [x] Patient tolerated treatment well [] Patient limited by fatique  [] Patient limited by pain  [] Patient limited by other medical complications  [] Other:     Overall Progression Towards Functional goals/ Treatment Progress Update:  [] Patient is progressing as expected towards functional goals listed. [] Progression is slowed due to complexities/Impairments listed. [] Progression has been slowed due to co-morbidities.   [x] Plan just implemented, too soon to assess goals progression <30days   [] Goals require adjustment due to lack of progress  [] Patient is not progressing as expected and requires additional follow up with physician  [] Other    Prognosis for POC: [x] Good [] Fair  [] Poor    Patient requires continued skilled intervention: [x] Yes  [] No        PLAN: Decrease pain, increase knee ROM, improve quad contraction, and increase strength per protocol. [x] Continue per plan of care [] Alter current plan (see comments)  [] Plan of care initiated [] Hold pending MD visit [] Discharge    Electronically signed by: Rolando Freed PT     Note: If patient does not return for scheduled/recommended follow up visits, this note will serve as a discharge from care along with the most recent update on progress.

## 2022-04-19 ENCOUNTER — HOSPITAL ENCOUNTER (OUTPATIENT)
Dept: PHYSICAL THERAPY | Age: 32
Setting detail: THERAPIES SERIES
Discharge: HOME | End: 2022-04-19
Payer: COMMERCIAL

## 2022-04-19 PROCEDURE — 97112 NEUROMUSCULAR REEDUCATION: CPT

## 2022-04-19 PROCEDURE — 97110 THERAPEUTIC EXERCISES: CPT

## 2022-04-19 NOTE — FLOWSHEET NOTE
[facility]    Physical Therapy Treatment Note/ Progress Report:     Date:  2022    Patient Name:  Priya Alonso    :  1990  MRN: [id number]  Medical/Treatment Diagnosis Information:  · Diagnosis: S83.512D (ICD-10-CM) - Rupture of anterior cruciate ligament of left knee, subsequent [id number] (ICD-10-CM) - Tear of lateral meniscus of left knee, current, unspecified tear type, initial encounter  · Treatment Diagnosis: s/p ACLR w/ quad tendon allograft and partial lateral meniscectomy  Insurance/Certification information:  PT Insurance Information: [facility] - $2400 deductible, 80%/20% co-insurance, 30 PT visits per calendar year  Physician Information:  Referring Practitioner: Dr. Nasrin Cash of care signed (Y/N):     Date of Patient follow up with Physician: 3/11/22     Progress Report: []  Yes  [x]  No     Functional Scale: LEFS = 67% disability Date: 3/3/22     LEFS = 42% disability  22    Date Range for reporting period:  Beginning:  3/3/22  Endin22    Progress report due (10 Rx/or 30 days whichever is less):      Recertification due (POC duration/ or 90 days whichever is less): 5/3/22     Visit # Insurance Allowable Auth Needed   14 30 []Yes    []No     Pain level:  0/10     SUBJECTIVE:  Pt reports that the knee was a little bothersome last night, reports he did not ice last night and believes that would have helped. Was standing and was active on [place] an amusement park.      OBJECTIVE: s/p 7+ weeks    Observation:    Test measurements:     3/15: steristrips intact, no drainage or overt s/sx of infection   3/28/22: L knee ext AROM = lacking 5 at beginning of session, 0 with PROM/OP; L knee flex AAROM = 113   22: L knee flex AAROM = 124 (w/wall slides)   22: L knee ext AROM = lacking 1 at rest, 0 with QS; flex AAROM = 135 (w/wall slides)   LEFS = 46/80 = 42% disability    RESTRICTIONS/PRECAUTIONS: s/p L ACLR w/ quad tendon allograft and partial lateral meniscectomy (2/25/22), hx of L ACLR (10 years ago)    Exercises/Interventions:     Therapeutic Ex 29' Resistance Sets/sec Reps Notes   Recumbent bike5'   w/IASTMQuad sets    1  10\" 10  10 HEP   Seated heel slide with strap    1   15      TKE with 5 sec holds 75#210     S/L SLR abd  Prone hip extension   2#  2# 2  2 10  10    Prone TKE  2 10    HS stretch at EOB  30\" 3    Bridges  2 10 Prone quad stretch  30\" 3      IB calf stretch    30\"   3    HEP   Supine SLR flex 2# 2 10 With BFR   Hip abduction SLR  2#   With BFR   Mini squats to 45 degrees   2 10 With BFR   Wall slides for knee flexion  5\" 15    BFR  8'  See below  SLR flex, SLR abd, mini squats, wall sits   Leg press     NPV   Knee extension iso's    NPV   Wall sits to about 45 degrees w/BS 30\" 3             Therapeutic Activities 5'        Forward step ups 6\" 1 10           Sports cord circuit        Marches   1 10x    Side steps L leading   1 10x                  Manual Intervention 6'       Knee mobs/PROM 3'  Gr II-III Ext   Tib/Fem Mobs       Patella Mobs 3'   Inf, sup, M/L   Ankle mobs         Distal HS, distal ITB          NMR re-education 10'        10\" on/10\" off  5' with quad sets, 5' with TKE blue   SLS ground 20\" 3    Gait training 3'      Lateral stepping teal 1 2 laps Band around ankles   TM push 3'   To normalize gait pattern   Fwd mini bosu lunge iso  3\" 10                              [facility] BFR Smart Phase Protocol       Spoke with Dr. Anil Cleary regarding the use of BFR with patient.     BFR Session 4              [facility] Personalized Tourniquet System for BFR     LE: up to 80% LOP       UE: up to 50% LOP        BFR Location: L thigh  BFR set at: 121 mmHg (70%)              Protocol: 30/15/15/15       Exercises:   Reps 30 sec Notes           SLR flex 2# # of reps required  30 Rest     completed  30 + 26     SLR abd 2# reps required  15 Rest     completed 15     Mini squats reps required  15 Rest     completed  15     Wall sits  reps required  30\" Rest     completed  30\" + 30\"             Therapeutic Exercise and NMR EXR  [x] (59498) Provided verbal/tactile cueing for activities related to strengthening, flexibility, endurance, ROM for improvements in LE, proximal hip, and core control with self care, mobility, lifting, ambulation.  [] (32022) Provided verbal/tactile cueing for activities related to improving balance, coordination, kinesthetic sense, posture, motor skill, proprioception  to assist with LE, proximal hip, and core control in self care, mobility, lifting, ambulation and eccentric single leg control.      NMR and Therapeutic Activities:    [x] (22161 or 28622) Provided verbal/tactile cueing for activities related to improving balance, coordination, kinesthetic sense, posture, motor skill, proprioception and motor activation to allow for proper function of core, proximal hip and LE with self care and ADLs  [] (91113) Gait Re-education- Provided training and instruction to the patient for proper LE, core and proximal hip recruitment and positioning and eccentric body weight control with ambulation re-education including up and down stairs     Home Exercise Program:    [x] (15347) Reviewed/Progressed HEP activities related to strengthening, flexibility, endurance, ROM of core, proximal hip and LE for functional self-care, mobility, lifting and ambulation/stair navigation   [] (52862)Reviewed/Progressed HEP activities related to improving balance, coordination, kinesthetic sense, posture, motor skill, proprioception of core, proximal hip and LE for self care, mobility, lifting, and ambulation/stair navigation      Manual Treatments:  PROM / STM / Oscillations-Mobs:  G-I, II, III, IV (PA's, Inf., Post.)  [x] (49808) Provided manual therapy to mobilize LE, proximal hip and/or LS spine soft tissue/joints for the purpose of modulating pain, promoting relaxation, increasing ROM, reducing/eliminating soft tissue swelling/inflammation/restriction, improving soft tissue extensibility and allowing for proper ROM for normal function with self care, mobility, lifting and ambulation. Modalities:      Charges:  Timed Code Treatment Minutes: 50   Total Treatment Minutes: 50       [] EVAL (LOW) 10530 (typically 20 minutes face-to-face)  [] EVAL (MOD) 78834 (typically 30 minutes face-to-face)  [] EVAL (HIGH) 49003 (typically 45 minutes face-to-face)  [] RE-EVAL     [x] ZN(43733) x 2    [] IONTO (58154)  [x] NMR (93833) x  1   [] VASO (34230)   [] Manual (97877) x     [] Other:  [] TA (79153)x     [] Mech Traction (00444)  [] ES(attended) (42726)     [] ES (un) (80630): If BWC Please Indicate Time In/Out and Total Minutes  CPT Code Time in Time out Total Min                                           GOALS:  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 Progressing: []? Met: []? Not Met: []? Adjusted     Therapist goals for Patient:   Short Term Goals: To be achieved in: 2 weeks  1. Independent in HEP and progression per patient tolerance, in order to prevent re-injury. []? Progressing: [x]? Met: []? Not Met: []? Adjusted  2. Patient will have a decrease in pain to facilitate improvement in movement, function, and ADLs as indicated by Functional Deficits. []? Progressing: [x]? Met: []? Not Met: []? Adjusted     Long Term Goals: To be achieved in: 8 weeks  1. Disability index score of 25% or less for the LEFS to assist with reaching prior level of function. [x]? Progressing: []? Met: []? Not Met: []? Adjusted  2. Patient will demonstrate increased AROM to WNL to allow for proper joint functioning as indicated by patients Functional Deficits. []? Progressing: [x]? Met: []? Not Met: []? Adjusted  3.  Patient will demonstrate an increase in Strength to good proximal hip strength and control, within 5lb HHD in LE to allow for proper functional mobility as indicated by patients Functional Deficits. [x]? Progressing: []? Met: []? Not Met: []? Adjusted  4. Patient will return to 15+ minutes of ambulation without increased symptoms or restriction to return to PLOF. [x]? Progressing: []? Met: []? Not Met: []? Adjusted  5. Patient will tolerate 25+ minutes of sitting without increased symptoms or restriction to return to PLOF. [x]? Progressing: []? Met: []? Not Met: []? Adjusted       Progression Towards Functional goals:  [x] Patient is progressing as expected towards functional goals listed. [] Progression is slowed due to complexities listed. [] Progression has been slowed due to co-morbidities. [] Plan just implemented, too soon to assess goals progression  [] Other:     ASSESSMENT:  Pt demonstrates improved quad control with step-ups. Improved AROM L knee extension in long sit. Lateral trunk lean persists with weight-bearing on L LE, although less than previous session. Plan to introduce leg press with patent education on use of machines next week with proper form and exercise intensity. Return to Play: (if applicable)   []  Stage 1: Intro to Strength   []  Stage 2: Return to Run and Strength   []  Stage 3: Return to Jump and Strength   []  Stage 4: Dynamic Strength and Agility   []  Stage 5: Sport Specific Training     []  Ready to Return to Play, Meets All Above Stages   []  Not Ready for Return to Sports   Comments:            Treatment/Activity Tolerance:  [x] Patient tolerated treatment well [] Patient limited by fatique  [] Patient limited by pain  [] Patient limited by other medical complications  [] Other:     Overall Progression Towards Functional goals/ Treatment Progress Update:  [x] Patient is progressing as expected towards functional goals listed. [] Progression is slowed due to complexities/Impairments listed. [] Progression has been slowed due to co-morbidities.   [] Plan just implemented, too soon to assess goals progression <30days   [] Goals require adjustment due to lack of progress  [] Patient is not progressing as expected and requires additional follow up with physician  [] Other    Prognosis for POC: [x] Good [] Fair  [] Poor    Patient requires continued skilled intervention: [x] Yes  [] No        PLAN: Decrease pain, increase knee ROM, improve quad contraction, and increase strength per protocol. [x] Continue per plan of care [] Alter current plan (see comments)  [] Plan of care initiated [] Hold pending MD visit [] Discharge    Electronically signed by: Ibeth Cramer, ALONA     Therapist was present, directed the patient's care, made skilled judgement, and was responsible for assessment and treatment of the patient. Rodriguez Stevenson PT     Note: If patient does not return for scheduled/recommended follow up visits, this note will serve as a discharge from care along with the most recent update on progress.

## 2022-04-21 ENCOUNTER — HOSPITAL ENCOUNTER (OUTPATIENT)
Dept: PHYSICAL THERAPY | Age: 32
Setting detail: THERAPIES SERIES
Discharge: HOME | End: 2022-04-21
Payer: COMMERCIAL

## 2022-04-21 PROCEDURE — 97112 NEUROMUSCULAR REEDUCATION: CPT | Performed by: SPECIALIST/TECHNOLOGIST

## 2022-04-21 PROCEDURE — 97110 THERAPEUTIC EXERCISES: CPT | Performed by: SPECIALIST/TECHNOLOGIST

## 2022-04-21 NOTE — FLOWSHEET NOTE
[employer]    Physical Therapy Treatment Note/ Progress Report:     Date:  2022    Patient Name:  Rogelio Beckham    :  1990  MRN: [id number]  Medical/Treatment Diagnosis Information:  · Diagnosis: S83.512D (ICD-10-CM) - Rupture of anterior cruciate ligament of left knee, subsequent [id number] (ICD-10-CM) - Tear of lateral meniscus of left knee, current, unspecified tear type, initial encounter  · Treatment Diagnosis: s/p ACLR w/ quad tendon allograft and partial lateral meniscectomy  Insurance/Certification information:  PT Insurance Information: [facility] - $2400 deductible, 80%/20% co-insurance, 30 PT visits per calendar year  Physician Information:  Referring Practitioner: Dr. Miki Lesches of care signed (Y/N):     Date of Patient follow up with Physician: 3/11/22     Progress Report: []  Yes  [x]  No     Functional Scale: LEFS = 67% disability Date: 3/3/22     LEFS = 42% disability  22    Date Range for reporting period:  Beginning:  3/3/22  Endin22    Progress report due (10 Rx/or 30 days whichever is less): 45     Recertification due (POC duration/ or 90 days whichever is less): 5/3/22     Visit # Insurance Allowable Auth Needed   15 30 []Yes    []No     Pain level:  0/10     SUBJECTIVE: Pt reports his left knee doing well today with work etc. Swelling is minimal to date    OBJECTIVE: s/p 7+ weeks    Observation:    Test measurements:     3/15: steristrips intact, no drainage or overt s/sx of infection   3/28/22: L knee ext AROM = lacking 5 at beginning of session, 0 with PROM/OP; L knee flex AAROM = 113   22: L knee flex AAROM = 124 (w/wall slides)   22: L knee ext AROM = lacking 1 at rest, 0 with QS; flex AAROM = 135 (w/wall slides)   LEFS = 46/80 = 42% disability    RESTRICTIONS/PRECAUTIONS: s/p L ACLR w/ quad tendon allograft and partial lateral meniscectomy (22), hx of L ACLR (10 years ago)    Exercises/Interventions:     Therapeutic Ex 29' Resistance Sets/sec Reps Notes   Recumbent bike5'   w/IASTMSeated heel slide with strap    1   15      TKE with 5 sec holds 75#210        SLR +  30\" 3x 4/ 21   HS stretch at EOB  30\" 3x    Bridges  2 10 Prone/ standing quad stretch  30\" 3x 4/ 21     IB calf stretch    30\"   3    HEP   Supine SLR flex 2# 2 10 With BFR   Hip abduction SLR  2#   With BFR   Mini squats to 45 degrees   2 10 With BFR   Wall slides for knee flexion  5\" 15    BFR  8'  See below  SLR flex, SLR abd, mini squats, wall sits  4/ 21   Leg press  B 0/90  #   60# 2 10x 4/21   Knee extension iso's       Wall sits to about 45 degrees w/BS 30\" 3 BFR            Therapeutic Activities 5'        Forward step ups 6\" 1 10           Sports cord circuit           Side steps L leading   1 10x                  Manual Intervention 6'       Knee mobs/PROM 3'  Gr II-III Ext   Tib/Fem Mobs       Patella Mobs 3'   Inf, sup, M/L            Distal HS, distal ITB          NMR re-education 10'           SLS ground 20\" 3x 4/ 21        Lateral stepping teal 1 2 laps Band around ankles   Fwd mini bosu lunge iso  3\" 10                              [facility] BFR Smart Phase Protocol       Spoke with Dr. John Goncalves regarding the use of BFR with patient.     BFR Session 5              [facility] Personalized Tourniquet System for BFR     LE: up to 80% LOP       UE: up to 50% LOP        BFR Location: L thigh  BFR set at: 125 mmHg (70%)              Protocol: 30/15/15/15       Exercises:   Reps 30 sec Notes    1+ cycles          SLR flex 2# # of reps required  30      completed  30 + 26     SLR abd 2# reps required  15      completed  15     Mini squats reps required  15      completed  15     Wall sits  reps required  30\"      completed  30\" + 30\"             Therapeutic Exercise and NMR EXR  [x] (47814) Provided verbal/tactile cueing for activities related to strengthening, flexibility, endurance, ROM for improvements in LE, proximal hip, and core control with self care, mobility, lifting, ambulation.  [] (31413) Provided verbal/tactile cueing for activities related to improving balance, coordination, kinesthetic sense, posture, motor skill, proprioception  to assist with LE, proximal hip, and core control in self care, mobility, lifting, ambulation and eccentric single leg control. NMR and Therapeutic Activities:    [x] (47859 or 31217) Provided verbal/tactile cueing for activities related to improving balance, coordination, kinesthetic sense, posture, motor skill, proprioception and motor activation to allow for proper function of core, proximal hip and LE with self care and ADLs  [] (02094) Gait Re-education- Provided training and instruction to the patient for proper LE, core and proximal hip recruitment and positioning and eccentric body weight control with ambulation re-education including up and down stairs     Home Exercise Program:    [x] (96561) Reviewed/Progressed HEP activities related to strengthening, flexibility, endurance, ROM of core, proximal hip and LE for functional self-care, mobility, lifting and ambulation/stair navigation   [] (03674)Reviewed/Progressed HEP activities related to improving balance, coordination, kinesthetic sense, posture, motor skill, proprioception of core, proximal hip and LE for self care, mobility, lifting, and ambulation/stair navigation      Manual Treatments:  PROM / STM / Oscillations-Mobs:  G-I, II, III, IV (PA's, Inf., Post.)  [x] (82453) Provided manual therapy to mobilize LE, proximal hip and/or LS spine soft tissue/joints for the purpose of modulating pain, promoting relaxation,  increasing ROM, reducing/eliminating soft tissue swelling/inflammation/restriction, improving soft tissue extensibility and allowing for proper ROM for normal function with self care, mobility, lifting and ambulation.      Modalities:      Charges:  Timed Code Treatment Minutes: 50   Total Treatment Minutes: 50       [] EVAL (LOW) 22427 (typically 20 minutes face-to-face)  [] EVAL (MOD) 52139 (typically 30 minutes face-to-face)  [] EVAL (HIGH) 83839 (typically 45 minutes face-to-face)  [] RE-EVAL     [x] GH(90652) x 2    [] IONTO (37660)  [x] NMR (96233) x  1   [] VASO (33326)   [] Manual (16213) x     [] Other:  [] TA (89306)x     [] Mech Traction (16554)  [] ES(attended) (71169)     [] ES (un) (73884): If BWC Please Indicate Time In/Out and Total Minutes  CPT Code Time in Time out Total Min                                           GOALS:  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 Progressing: []? Met: []? Not Met: []? Adjusted     Therapist goals for Patient:   Short Term Goals: To be achieved in: 2 weeks  1. Independent in HEP and progression per patient tolerance, in order to prevent re-injury. []? Progressing: [x]? Met: []? Not Met: []? Adjusted  2. Patient will have a decrease in pain to facilitate improvement in movement, function, and ADLs as indicated by Functional Deficits. []? Progressing: [x]? Met: []? Not Met: []? Adjusted     Long Term Goals: To be achieved in: 8 weeks  1. Disability index score of 25% or less for the LEFS to assist with reaching prior level of function. [x]? Progressing: []? Met: []? Not Met: []? Adjusted  2. Patient will demonstrate increased AROM to WNL to allow for proper joint functioning as indicated by patients Functional Deficits. []? Progressing: [x]? Met: []? Not Met: []? Adjusted  3. Patient will demonstrate an increase in Strength to good proximal hip strength and control, within 5lb HHD in LE to allow for proper functional mobility as indicated by patients Functional Deficits. [x]? Progressing: []? Met: []? Not Met: []? Adjusted  4. Patient will return to 15+ minutes of ambulation without increased symptoms or restriction to return to PLOF. [x]? Progressing: []? Met: []? Not Met: []? Adjusted  5.  Patient will tolerate 25+ minutes of sitting without increased symptoms or restriction to return to PLOF. [x]? Progressing: []? Met: []? Not Met: []? Adjusted       Progression Towards Functional goals:  [x] Patient is progressing as expected towards functional goals listed. [] Progression is slowed due to complexities listed. [] Progression has been slowed due to co-morbidities. [] Plan just implemented, too soon to assess goals progression  [] Other:     ASSESSMENT:  Pt demonstrates improved quad control with  BFR, wallsits, minisquats and addition of LP etc. Pt had fair stability with SLS with minimal body lean. Pt reported increased overall fatigue but denied pain with progressions but struggled with SLR+. Pt advised to monitor duration of wb with S/P 8 weeks and intermittent swelling. Return to Play: (if applicable)   []  Stage 1: Intro to Strength   []  Stage 2: Return to Run and Strength   []  Stage 3: Return to Jump and Strength   []  Stage 4: Dynamic Strength and Agility   []  Stage 5: Sport Specific Training     []  Ready to Return to Play, Meets All Above Stages   []  Not Ready for Return to Sports   Comments:            Treatment/Activity Tolerance:  [x] Patient tolerated treatment well [] Patient limited by fatique  [] Patient limited by pain  [] Patient limited by other medical complications  [] Other:     Overall Progression Towards Functional goals/ Treatment Progress Update:  [x] Patient is progressing as expected towards functional goals listed. [] Progression is slowed due to complexities/Impairments listed. [] Progression has been slowed due to co-morbidities.   [] Plan just implemented, too soon to assess goals progression <30days   [] Goals require adjustment due to lack of progress  [] Patient is not progressing as expected and requires additional follow up with physician  [] Other    Prognosis for POC: [x] Good [] Fair  [] Poor    Patient requires continued skilled intervention: [x] Yes  [] No PLAN: Decrease pain, increase knee ROM, improve quad contraction, and increase strength per protocol. [x] Continue per plan of care [] Alter current plan (see comments)  [] Plan of care initiated [] Hold pending MD visit [] Discharge    Electronically signed by   Machelle Garrison [facility]     Therapist was present, directed the patient's care, made skilled judgement, and was responsible for assessment and treatment of the patient. Florinda Sawyer, PTA, [id number]    Note: If patient does not return for scheduled/recommended follow up visits, this note will serve as a discharge from care along with the most recent update on progress.

## 2022-04-26 ENCOUNTER — HOSPITAL ENCOUNTER (OUTPATIENT)
Dept: PHYSICAL THERAPY | Age: 32
Setting detail: THERAPIES SERIES
Discharge: HOME | End: 2022-04-26
Payer: COMMERCIAL

## 2022-04-26 PROCEDURE — 97110 THERAPEUTIC EXERCISES: CPT | Performed by: SPECIALIST/TECHNOLOGIST

## 2022-04-26 NOTE — FLOWSHEET NOTE
years ago)    Exercises/Interventions:   Therapeutic Ex 29' Resistance Sets/sec Reps Notes   Recumbent bike5'   4/26MH ABD 60# 3 10x 4/26   Seated heel slide with strap    1   15      4/26       SLR +  30\" 3x 4/ 21   HS stretch at EOB  30\" 3x 4/26   Bridges  2 10 Prone/ standing quad stretch  30\" 3x 4/26     IB calf stretch    30\"   3    HEP   Supine SLR flex 2# 2 10 With BFR   Hip abduction SLR  2#   With BFR   Mini squats to 45 degrees TRX  2 10 4/ 26   Wall slides for knee flexion  5\" 15    Leg press  B 0/90  #   60# 2 10x 4/26   Knee extension iso's       Wall sits to about 45 degrees w/BS 30\" 3 BFR            Therapeutic Activities 5'        Forward step ups 6\" 1 10 HOLD 4/26          Sports cord circuit           Side steps L leading   1 10x                  Manual Intervention       Knee mobs/PROM Tib/Fem Mobs Patella Mobs          Distal HS, distal ITB          NMR re-education 10'       LSD  45x  HOLD 4/26   SLS ground 20\" 3x 4/ 21        Lateral stepping wine 1 2 laps Band around ankles 4/26   Fwd mini bosu lunge iso  3\" 10                                                              Therapeutic Exercise and NMR EXR  [x] (40894) Provided verbal/tactile cueing for activities related to strengthening, flexibility, endurance, ROM for improvements in LE, proximal hip, and core control with self care, mobility, lifting, ambulation.  [] (94054) Provided verbal/tactile cueing for activities related to improving balance, coordination, kinesthetic sense, posture, motor skill, proprioception  to assist with LE, proximal hip, and core control in self care, mobility, lifting, ambulation and eccentric single leg control.      NMR and Therapeutic Activities:    [x] (84015 or 88124) Provided verbal/tactile cueing for activities related to improving balance, coordination, kinesthetic sense, posture, motor skill, proprioception and motor activation to allow for proper function of core, proximal hip and LE with self care and ADLs  [] (55925) Gait Re-education- Provided training and instruction to the patient for proper LE, core and proximal hip recruitment and positioning and eccentric body weight control with ambulation re-education including up and down stairs     Home Exercise Program:    [x] (73443) Reviewed/Progressed HEP activities related to strengthening, flexibility, endurance, ROM of core, proximal hip and LE for functional self-care, mobility, lifting and ambulation/stair navigation   [] (59682)Reviewed/Progressed HEP activities related to improving balance, coordination, kinesthetic sense, posture, motor skill, proprioception of core, proximal hip and LE for self care, mobility, lifting, and ambulation/stair navigation      Manual Treatments:  PROM / STM / Oscillations-Mobs:  G-I, II, III, IV (PA's, Inf., Post.)  [x] (36318) Provided manual therapy to mobilize LE, proximal hip and/or LS spine soft tissue/joints for the purpose of modulating pain, promoting relaxation,  increasing ROM, reducing/eliminating soft tissue swelling/inflammation/restriction, improving soft tissue extensibility and allowing for proper ROM for normal function with self care, mobility, lifting and ambulation. Modalities: ice cup 8' to infrapatellar knee/ patella  Charges:  Timed Code Treatment Minutes: 45   Total Treatment Minutes: 53       [] EVAL (LOW) 86596 (typically 20 minutes face-to-face)  [] EVAL (MOD) 54091 (typically 30 minutes face-to-face)  [] EVAL (HIGH) 31359 (typically 45 minutes face-to-face)  [] RE-EVAL     [x] FC(10383) x 3    [] IONTO (49058)  [] NMR (80637) x     [] VASO (36685)   [] Manual (65127) x     [] Other:  [] TA (17005)x     [] Mech Traction (45425)  [] ES(attended) (53565)     [] ES (un) (01439): If BWC Please Indicate Time In/Out and Total Minutes  CPT Code Time in Time out Total Min                                           GOALS:  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  Progressing: []? Met: []? Not Met: []? Adjusted     Therapist goals for Patient:   Short Term Goals: To be achieved in: 2 weeks  1. Independent in HEP and progression per patient tolerance, in order to prevent re-injury. []? Progressing: [x]? Met: []? Not Met: []? Adjusted  2. Patient will have a decrease in pain to facilitate improvement in movement, function, and ADLs as indicated by Functional Deficits. []? Progressing: [x]? Met: []? Not Met: []? Adjusted     Long Term Goals: To be achieved in: 8 weeks  1. Disability index score of 25% or less for the LEFS to assist with reaching prior level of function. [x]? Progressing: []? Met: []? Not Met: []? Adjusted  2. Patient will demonstrate increased AROM to WNL to allow for proper joint functioning as indicated by patients Functional Deficits. []? Progressing: [x]? Met: []? Not Met: []? Adjusted  3. Patient will demonstrate an increase in Strength to good proximal hip strength and control, within 5lb HHD in LE to allow for proper functional mobility as indicated by patients Functional Deficits. [x]? Progressing: []? Met: []? Not Met: []? Adjusted  4. Patient will return to 15+ minutes of ambulation without increased symptoms or restriction to return to PLOF. [x]? Progressing: []? Met: []? Not Met: []? Adjusted  5. Patient will tolerate 25+ minutes of sitting without increased symptoms or restriction to return to PLOF. [x]? Progressing: []? Met: []? Not Met: []? Adjusted       Progression Towards Functional goals:  [x] Patient is progressing as expected towards functional goals listed. [] Progression is slowed due to complexities listed. [] Progression has been slowed due to co-morbidities. [] Plan just implemented, too soon to assess goals progression  [] Other:     ASSESSMENT:  Pt program modified today due to increased discomfort in patella and across the fatpad related to wearing an compression dressing all day in a seated position. Pt had increased tenderness with patella and as a result, his program was modified with ice cup massage at end of session and limited strengthening with increased compressive forces with exercises like sls and wallsits. Pt demonstrates improved quad control with  minisquats and addition of LP etc.    Pt advised to monitor duration of wb with S/P 8 weeks and intermittent swelling. Return to Play: (if applicable)   []  Stage 1: Intro to Strength   []  Stage 2: Return to Run and Strength   []  Stage 3: Return to Jump and Strength   []  Stage 4: Dynamic Strength and Agility   []  Stage 5: Sport Specific Training     []  Ready to Return to Play, Meets All Above Stages   []  Not Ready for Return to Sports   Comments:            Treatment/Activity Tolerance:  [x] Patient tolerated treatment well [x] Patient limited by fatique  [] Patient limited by pain  [] Patient limited by other medical complications  [] Other:     Overall Progression Towards Functional goals/ Treatment Progress Update:  [x] Patient is progressing as expected towards functional goals listed. [] Progression is slowed due to complexities/Impairments listed. [] Progression has been slowed due to co-morbidities. [] Plan just implemented, too soon to assess goals progression <30days   [] Goals require adjustment due to lack of progress  [] Patient is not progressing as expected and requires additional follow up with physician  [] Other    Prognosis for POC: [x] Good [] Fair  [] Poor    Patient requires continued skilled intervention: [x] Yes  [] No        PLAN: Decrease pain, increase knee ROM, improve quad contraction, and increase strength per protocol. Pt advised to decrease  Compression  Wrap and change to sleeve with standing job demands.  Avoid increasing compression forces with seated positions and monitor gait mechanics  [x] Continue per plan of care [] Alter current plan (see comments)  [] Plan of care initiated [] Hold pending MD visit [] Discharge    Electronically signed by:   Christina More, PTA, [id number]    Note: If patient does not return for scheduled/recommended follow up visits, this note will serve as a discharge from care along with the most recent update on progress.

## 2022-04-28 ENCOUNTER — HOSPITAL ENCOUNTER (OUTPATIENT)
Dept: PHYSICAL THERAPY | Age: 32
Setting detail: THERAPIES SERIES
Discharge: HOME | End: 2022-04-28
Payer: COMMERCIAL

## 2022-04-28 PROCEDURE — 97110 THERAPEUTIC EXERCISES: CPT | Performed by: SPECIALIST/TECHNOLOGIST

## 2022-04-28 PROCEDURE — 97016 VASOPNEUMATIC DEVICE THERAPY: CPT | Performed by: SPECIALIST/TECHNOLOGIST

## 2022-04-28 PROCEDURE — 97112 NEUROMUSCULAR REEDUCATION: CPT | Performed by: SPECIALIST/TECHNOLOGIST

## 2022-04-28 NOTE — FLOWSHEET NOTE
[facility] [employer]    Physical Therapy Treatment Note/ Progress Report:     Date:  2022    Patient Name:  Nevaeh Lopez    :  1990  MRN: [id number]  Medical/Treatment Diagnosis Information:  · Diagnosis: S83.512D (ICD-10-CM) - Rupture of anterior cruciate ligament of left knee, subsequent [id number] (ICD-10-CM) - Tear of lateral meniscus of left knee, current, unspecified tear type, initial encounter  · Treatment Diagnosis: s/p ACLR w/ quad tendon allograft and partial lateral meniscectomy  Insurance/Certification information:  PT Insurance Information: [facility] - $2400 deductible, 80%/20% co-insurance, 30 PT visits per calendar year  Physician Information:  Referring Practitioner: Dr. Fabiana Goncalves of care signed (Y/N):     Date of Patient follow up with Physician:      Progress Report: []  Yes  [x]  No     Functional Scale: LEFS = 67% disability Date: 3/3/22     LEFS = 42% disability  22    Date Range for reporting period:  Beginning:  3/3/22  Endin22    Progress report due (10 Rx/or 30 days whichever is less):      Recertification due (POC duration/ or 90 days whichever is less): 5/3/22     Visit # Insurance Allowable Auth Needed   16 30 []Yes    []No     Pain level:  0/10     SUBJECTIVE: 9 weeks s/p  Pt reports using a knee sleeve and has noticed less patellar swelling/ soreness since d/c using an ace wrap.   Has been using an ice cup to help with swelling and is increasing his focus on ambulation  OBJECTIVE:   Observation:    Test measurements:     3/15: steristrips intact, no drainage or overt s/sx of infection   3/28/22: L knee ext AROM = lacking 5 at beginning of session, 0 with PROM/OP; L knee flex AAROM = 113   22: L knee flex AAROM = 124 (w/wall slides)   22: L knee ext AROM = lacking 1 at rest, 0 with QS; flex AAROM = 135 (w/wall slides)   LEFS = 46/80 = 42% disability    Less visible distal patellar swelling, fat pad swelling today compared to Tuesdays session. Improved ambulation with no antalgia, has B varus alignment    RESTRICTIONS/PRECAUTIONS: s/p L ACLR w/ quad tendon allograft and partial lateral meniscectomy (2/25/22), hx of L ACLR (10 years ago)    Exercises/Interventions:   Therapeutic Ex 29' Resistance Sets/sec Reps Notes   Recumbent bike5'   4/26MH ABD  B 60# 3 10x 4/26   Seated heel slide with strap    1   15      4/26       SLR +  30\" 3x 4/ 21   HS stretch at EOB off wedge  30\" 3x 4/28   Bridges  2 10 standing quad stretch  30\" 3x 4/26     IB calf stretch    30\"   3    HEP   Supine SLR flex 2# 2 10 With BFR   Hip abduction SLR  2#   With BFR   Mini squats to 45 degrees TRX  2 10 4/ 28   Wall slides for knee flexion  5\" 15    Leg press  B 0/90  SL 90#   60# 2 10x 4/28   Knee extension iso's F 10\" 10x 4/ 28   Wall sits to about 45 degrees w/BS 30\" 3 HEP            Therapeutic Activities 5'               Sports cord circuit           Side steps L leading   1 10x                  Manual Intervention       Knee mobs/PROM Tib/Fem Mobs Patella Mobs          Distal HS, distal ITB          NMR re-education 10'       LSD  45x  HOLD 4/26   SLS ground 20\" 3x 4/ 21        Lateral stepping wine 1 2 laps Band around ankles 4/26   Fwd mini bosu lunge iso  2-3\" 15x 4/ 28                             [facility] BFR Smart Phase Protocol  Spoke with Dr. Miguel Angel Umanzor regarding the use of BFR with patient.  BFR Session 6    [facility] Personalized Tourniquet System for BFR LE: up to 80% LOP  UE: up to 50% LOP   BFR Location: L thigh  BFR set at: 128 mmHg (70%)    Protocol: 30/15/15/15  Exercises:  SLR flex 2# # of reps required  30x  4/28    completed  30 + 26    SLR abd 2# reps required  15  4/28    completed  15    LSD reps required  15 4/ 28    completed  15    Wall sits  reps required  30\"               Therapeutic Exercise and NMR EXR  [x] (85868) Provided verbal/tactile cueing for activities related to strengthening, flexibility, endurance, ROM for improvements in LE, proximal hip, and core control with self care, mobility, lifting, ambulation.  [] (26286) Provided verbal/tactile cueing for activities related to improving balance, coordination, kinesthetic sense, posture, motor skill, proprioception  to assist with LE, proximal hip, and core control in self care, mobility, lifting, ambulation and eccentric single leg control. NMR and Therapeutic Activities:    [x] (09466 or 80714) Provided verbal/tactile cueing for activities related to improving balance, coordination, kinesthetic sense, posture, motor skill, proprioception and motor activation to allow for proper function of core, proximal hip and LE with self care and ADLs  [] (81005) Gait Re-education- Provided training and instruction to the patient for proper LE, core and proximal hip recruitment and positioning and eccentric body weight control with ambulation re-education including up and down stairs     Home Exercise Program:    [x] (05295) Reviewed/Progressed HEP activities related to strengthening, flexibility, endurance, ROM of core, proximal hip and LE for functional self-care, mobility, lifting and ambulation/stair navigation   [] (56936)Reviewed/Progressed HEP activities related to improving balance, coordination, kinesthetic sense, posture, motor skill, proprioception of core, proximal hip and LE for self care, mobility, lifting, and ambulation/stair navigation      Manual Treatments:  PROM / STM / Oscillations-Mobs:  G-I, II, III, IV (PA's, Inf., Post.)  [x] (66492) Provided manual therapy to mobilize LE, proximal hip and/or LS spine soft tissue/joints for the purpose of modulating pain, promoting relaxation,  increasing ROM, reducing/eliminating soft tissue swelling/inflammation/restriction, improving soft tissue extensibility and allowing for proper ROM for normal function with self care, mobility, lifting and ambulation.      Modalities: patella 15' Vasopnuematic for swelling/ inflammationo  Charges:  Timed Code Treatment Minutes: 45   Total Treatment Minutes: 53       [] EVAL (LOW) 00464 (typically 20 minutes face-to-face)  [] EVAL (MOD) 75212 (typically 30 minutes face-to-face)  [] EVAL (HIGH) 91133 (typically 45 minutes face-to-face)  [] RE-EVAL     [x] GO(53797) x 2   [] IONTO (00035)  [x] NMR (70929) x 1    [x] VASO (76517)   [] Manual (10973) x     [] Other:  [] TA (30043)x     [] Mech Traction (94661)  [] ES(attended) (13242)     [] ES (un) (40830): If BWC Please Indicate Time In/Out and Total Minutes  CPT Code Time in Time out Total Min                                           GOALS:  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 Progressing: []? Met: []? Not Met: []? Adjusted     Therapist goals for Patient:   Short Term Goals: To be achieved in: 2 weeks  1. Independent in HEP and progression per patient tolerance, in order to prevent re-injury. []? Progressing: [x]? Met: []? Not Met: []? Adjusted  2. Patient will have a decrease in pain to facilitate improvement in movement, function, and ADLs as indicated by Functional Deficits. []? Progressing: [x]? Met: []? Not Met: []? Adjusted     Long Term Goals: To be achieved in: 8 weeks  1. Disability index score of 25% or less for the LEFS to assist with reaching prior level of function. [x]? Progressing: []? Met: []? Not Met: []? Adjusted  2. Patient will demonstrate increased AROM to WNL to allow for proper joint functioning as indicated by patients Functional Deficits. []? Progressing: [x]? Met: []? Not Met: []? Adjusted  3. Patient will demonstrate an increase in Strength to good proximal hip strength and control, within 5lb HHD in LE to allow for proper functional mobility as indicated by patients Functional Deficits. [x]? Progressing: []? Met: []? Not Met: []? Adjusted  4.  Patient will return to 15+ minutes of ambulation without increased symptoms or restriction to return to PLOF. [x]? Progressing: []? Met: []? Not Met: []? Adjusted  5. Patient will tolerate 25+ minutes of sitting without increased symptoms or restriction to return to PLOF. [x]? Progressing: []? Met: []? Not Met: []? Adjusted       Progression Towards Functional goals:  [x] Patient is progressing as expected towards functional goals listed. [] Progression is slowed due to complexities listed. [] Progression has been slowed due to co-morbidities. [] Plan just implemented, too soon to assess goals progression  [] Other:     ASSESSMENT:  Pt program progressed today due to less discomfort w/  patella and improved strength in quadriceps/ lower extremity muscle function. Pt  is eager to increase function with pushing his strength more but did report some pressure under his patella w/ LSD but making nice steady gains. The Rom was decreased as a result. Pt demonstrates improved quad control with  minisquats and addition of LP etc.    Pt advised to monitor duration of wb with S/P 9 weeks and intermittent swelling, continue    Return to Play: (if applicable)   []  Stage 1: Intro to Strength   []  Stage 2: Return to Run and Strength   []  Stage 3: Return to Jump and Strength   []  Stage 4: Dynamic Strength and Agility   []  Stage 5: Sport Specific Training     []  Ready to Return to Play, Meets All Above Stages   []  Not Ready for Return to Sports   Comments:            Treatment/Activity Tolerance:  [x] Patient tolerated treatment well [x] Patient limited by fatique  [] Patient limited by pain  [] Patient limited by other medical complications  [] Other:     Overall Progression Towards Functional goals/ Treatment Progress Update:  [x] Patient is progressing as expected towards functional goals listed. [] Progression is slowed due to complexities/Impairments listed. [] Progression has been slowed due to co-morbidities.   [] Plan just implemented, too soon to assess goals progression <30days   [] Goals require adjustment due to lack of progress  [] Patient is not progressing as expected and requires additional follow up with physician  [] Other    Prognosis for POC: [x] Good [] Fair  [] Poor    Patient requires continued skilled intervention: [x] Yes  [] No        PLAN: Decrease pain, increase knee ROM, improve quad contraction, and increase strength per protocol. Pt advised to use knee sleeve when on his feet only. Avoid increasing compression forces with seated positions and monitor gait mechanics with normal pattern. [x] Continue per plan of care [] Alter current plan (see comments)  [] Plan of care initiated [] Hold pending MD visit [] Discharge    Electronically signed by:   Heather Coates PTA, [id number]    Note: If patient does not return for scheduled/recommended follow up visits, this note will serve as a discharge from care along with the most recent update on progress.

## 2022-05-02 ENCOUNTER — HOSPITAL ENCOUNTER (OUTPATIENT)
Dept: PHYSICAL THERAPY | Age: 32
Setting detail: THERAPIES SERIES
Discharge: HOME | End: 2022-05-02
Payer: COMMERCIAL

## 2022-05-02 PROCEDURE — 97110 THERAPEUTIC EXERCISES: CPT

## 2022-05-02 PROCEDURE — 97112 NEUROMUSCULAR REEDUCATION: CPT

## 2022-05-02 NOTE — FLOWSHEET NOTE
[facility] [employer]    Physical Therapy Treatment Note/ Progress Report:     Date:  2022    Patient Name:  Mini Gonzalez    :  1990  MRN: [id number]  Medical/Treatment Diagnosis Information:  · Diagnosis: S83.512D (ICD-10-CM) - Rupture of anterior cruciate ligament of left knee, subsequent [id number] (ICD-10-CM) - Tear of lateral meniscus of left knee, current, unspecified tear type, initial encounter  · Treatment Diagnosis: s/p ACLR w/ quad tendon allograft and partial lateral meniscectomy  Insurance/Certification information:  PT Insurance Information: [facility] - $2400 deductible, 80%/20% co-insurance, 30 PT visits per calendar year  Physician Information:  Referring Practitioner: Dr. Chitra Wilson of care signed (Y/N):     Date of Patient follow up with Physician:      Progress Report: []  Yes  [x]  No     Functional Scale: LEFS = 67% disability Date: 3/3/22     LEFS = 42% disability  22    Date Range for reporting period:  Beginning:  3/3/22  Endin22    Progress report due (10 Rx/or 30 days whichever is less):      Recertification due (POC duration/ or 90 days whichever is less): 5/3/22     Visit # Insurance Allowable Auth Needed   17 30 []Yes    []No     Pain level:  0/10     SUBJECTIVE: 9 weeks s/p. Pt reports that he did a lot of walking this weekend [place] on hilly/uneven surfaces and knee was a little sore, overall doing well though.       OBJECTIVE:   Observation:    Test measurements:     3/15: steristrips intact, no drainage or overt s/sx of infection   3/28/22: L knee ext AROM = lacking 5 at beginning of session, 0 with PROM/OP; L knee flex AAROM = 113   22: L knee flex AAROM = 124 (w/wall slides)   22: L knee ext AROM = lacking 1 at rest, 0 with QS; flex AAROM = 135 (w/wall slides)   LEFS = 46/80 = 42% disability    Less visible distal patellar swelling, fat pad swelling today compared to Tuesdays session. Improved ambulation with no antalgia, has B varus alignment    RESTRICTIONS/PRECAUTIONS: s/p L ACLR w/ quad tendon allograft and partial lateral meniscectomy (2/25/22), hx of L ACLR (10 years ago)    Exercises/Interventions:   Therapeutic Ex 30' Resistance Sets/sec Reps Notes   Recumbent bike5'   4/26MH ABD  B 60# 3 10x 4/26   Seated heel slide with strap    1   15      4/26       SLR +  30\" 3x 4/ 21   HS stretch at EOB off wedge  30\" 3x 4/28   Bridges  2 10 standing quad stretch  30\" 3x 4/26     IB calf stretch    30\"   3    HEP   Supine SLR flex 3# 2 10 With BFR   Hip abduction SLR  3#   With BFR   Mini squats to 45 degrees TRX  2 10 4/ 28   Wall slides for knee flexion  5\" 15    BFR  8'  See below  SLR flex, SLR abd, fwd step ups, wall sits  4/ 21   Leg press  B 0/90  #   60# 2 10x 4/28   HS curls DL 30# 2 10    Knee extension iso's F 10\" 10x 4/ 28   Wall sits to about 45 degrees w/BS 30\" 3 With BFR            Therapeutic Activities 5'        Forward step ups 6\" 1 10 With BFR          Sports cord circuit           Side steps L leading   1 10x                  Manual Intervention       Knee mobs/PROM Tib/Fem Mobs Patella Mobs          Distal HS, distal ITB          NMR re-education 15'       LSD  45x  HOLD 4/26   SLS airex 20\" 3x 4/ 21        Lateral stepping wine 1 2 laps Band around ankles 4/26   Fwd mini bosu lunge iso  2-3\" 15x 4/ 28   Retro slider lunges  1 15                       [facility] BFR Smart Phase Protocol  Spoke with Dr. Tara Houston regarding the use of BFR with patient.  BFR Session 6    [facility] Personalized Tourniquet System for BFR LE: up to 80% LOP  UE: up to 50% LOP   BFR Location: L thigh  BFR set at: 128 mmHg (70%)    Protocol: 30/15/15/15  Exercises:  Reps 30 sec Notes    1+ cycles   SLR flex 2# # of reps required  30x  4/28    completed  30 + 26    SLR abd 2# reps required  15  4/28    completed  15    LSD reps required  15 4/ 28    completed  15    Wall sits  reps required  30\"    completed  30\" + 30\"            Therapeutic Exercise and NMR EXR  [x] (65917) Provided verbal/tactile cueing for activities related to strengthening, flexibility, endurance, ROM for improvements in LE, proximal hip, and core control with self care, mobility, lifting, ambulation.  [] (62759) Provided verbal/tactile cueing for activities related to improving balance, coordination, kinesthetic sense, posture, motor skill, proprioception  to assist with LE, proximal hip, and core control in self care, mobility, lifting, ambulation and eccentric single leg control.      NMR and Therapeutic Activities:    [x] (23111 or 81794) Provided verbal/tactile cueing for activities related to improving balance, coordination, kinesthetic sense, posture, motor skill, proprioception and motor activation to allow for proper function of core, proximal hip and LE with self care and ADLs  [] (12684) Gait Re-education- Provided training and instruction to the patient for proper LE, core and proximal hip recruitment and positioning and eccentric body weight control with ambulation re-education including up and down stairs     Home Exercise Program:    [x] (69853) Reviewed/Progressed HEP activities related to strengthening, flexibility, endurance, ROM of core, proximal hip and LE for functional self-care, mobility, lifting and ambulation/stair navigation   [] (12392)Reviewed/Progressed HEP activities related to improving balance, coordination, kinesthetic sense, posture, motor skill, proprioception of core, proximal hip and LE for self care, mobility, lifting, and ambulation/stair navigation      Manual Treatments:  PROM / STM / Oscillations-Mobs:  G-I, II, III, IV (PA's, Inf., Post.)  [] (45100) Provided manual therapy to mobilize LE, proximal hip and/or LS spine soft tissue/joints for the purpose of modulating pain, promoting relaxation,  increasing ROM, reducing/eliminating soft tissue swelling/inflammation/restriction, improving soft tissue extensibility and allowing for proper ROM for normal function with self care, mobility, lifting and ambulation. Modalities:   Charges:  Timed Code Treatment Minutes: 45   Total Treatment Minutes: 53       [] EVAL (LOW) 53302 (typically 20 minutes face-to-face)  [] EVAL (MOD) 20662 (typically 30 minutes face-to-face)  [] EVAL (HIGH) 46774 (typically 45 minutes face-to-face)  [] RE-EVAL     [x] VC(99299) x 2   [] IONTO (99732)  [x] NMR (75975) x 1    [] VASO (81419)   [] Manual (21819) x     [] Other:  [] TA (80502)x     [] Mech Traction (71155)  [] ES(attended) (82341)     [] ES (un) (10889): If BWC Please Indicate Time In/Out and Total Minutes  CPT Code Time in Time out Total Min                                           GOALS:  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 Progressing: []? Met: []? Not Met: []? Adjusted     Therapist goals for Patient:   Short Term Goals: To be achieved in: 2 weeks  1. Independent in HEP and progression per patient tolerance, in order to prevent re-injury. []? Progressing: [x]? Met: []? Not Met: []? Adjusted  2. Patient will have a decrease in pain to facilitate improvement in movement, function, and ADLs as indicated by Functional Deficits. []? Progressing: [x]? Met: []? Not Met: []? Adjusted     Long Term Goals: To be achieved in: 8 weeks  1. Disability index score of 25% or less for the LEFS to assist with reaching prior level of function. [x]? Progressing: []? Met: []? Not Met: []? Adjusted  2. Patient will demonstrate increased AROM to WNL to allow for proper joint functioning as indicated by patients Functional Deficits. []? Progressing: [x]? Met: []? Not Met: []? Adjusted  3. Patient will demonstrate an increase in Strength to good proximal hip strength and control, within 5lb HHD in LE to allow for proper functional mobility as indicated by patients Functional Deficits. [x]? Progressing: []? Met: []?  Not Met: []? Adjusted  4. Patient will return to 15+ minutes of ambulation without increased symptoms or restriction to return to PLOF. [x]? Progressing: []? Met: []? Not Met: []? Adjusted  5. Patient will tolerate 25+ minutes of sitting without increased symptoms or restriction to return to PLOF. [x]? Progressing: []? Met: []? Not Met: []? Adjusted       Progression Towards Functional goals:  [x] Patient is progressing as expected towards functional goals listed. [] Progression is slowed due to complexities listed. [] Progression has been slowed due to co-morbidities. [] Plan just implemented, too soon to assess goals progression  [] Other:     ASSESSMENT:  Pt program progressed today due to less discomfort w/  patella and improved strength in quadriceps/ lower extremity muscle function. Held LSD today due to discomfort. Added HS curls and retro slider lunges with fatigue noted, no increased knee pain. Decrease in visible swelling noted to last session. Return to Play: (if applicable)   []  Stage 1: Intro to Strength   []  Stage 2: Return to Run and Strength   []  Stage 3: Return to Jump and Strength   []  Stage 4: Dynamic Strength and Agility   []  Stage 5: Sport Specific Training     []  Ready to Return to Play, Meets All Above Stages   []  Not Ready for Return to Sports   Comments:            Treatment/Activity Tolerance:  [x] Patient tolerated treatment well [x] Patient limited by fatique  [] Patient limited by pain  [] Patient limited by other medical complications  [] Other:     Overall Progression Towards Functional goals/ Treatment Progress Update:  [x] Patient is progressing as expected towards functional goals listed. [] Progression is slowed due to complexities/Impairments listed. [] Progression has been slowed due to co-morbidities.   [] Plan just implemented, too soon to assess goals progression <30days   [] Goals require adjustment due to lack of progress  [] Patient is not progressing as expected and requires additional follow up with physician  [] Other    Prognosis for POC: [x] Good [] Fair  [] Poor    Patient requires continued skilled intervention: [x] Yes  [] No        PLAN: Decrease pain, increase knee ROM, improve quad contraction, and increase strength per protocol. Pt advised to use knee sleeve when on his feet only. Avoid increasing compression forces with seated positions and monitor gait mechanics with normal pattern. [x] Continue per plan of care [] Alter current plan (see comments)  [] Plan of care initiated [] Hold pending MD visit [] Discharge    Electronically signed by:   Soha Maradiaga PT    Note: If patient does not return for scheduled/recommended follow up visits, this note will serve as a discharge from care along with the most recent update on progress.

## 2022-05-04 ENCOUNTER — HOSPITAL ENCOUNTER (OUTPATIENT)
Dept: PHYSICAL THERAPY | Age: 32
Setting detail: THERAPIES SERIES
Discharge: HOME | End: 2022-05-04
Payer: COMMERCIAL

## 2022-05-04 PROCEDURE — 97112 NEUROMUSCULAR REEDUCATION: CPT

## 2022-05-04 PROCEDURE — 97110 THERAPEUTIC EXERCISES: CPT

## 2022-05-04 NOTE — FLOWSHEET NOTE
[employer]    Physical Therapy Treatment Note/ Progress Report:     Date:  2022    Patient Name:  Moon Queen    :  1990  MRN: [id number]  Medical/Treatment Diagnosis Information:  · Diagnosis: S83.512D (ICD-10-CM) - Rupture of anterior cruciate ligament of left knee, subsequent [id number] (ICD-10-CM) - Tear of lateral meniscus of left knee, current, unspecified tear type, initial encounter  · Treatment Diagnosis: s/p ACLR w/ quad tendon allograft and partial lateral meniscectomy  Insurance/Certification information:  PT Insurance Information: [facility] - $2400 deductible, 80%/20% co-insurance, 30 PT visits per calendar year  Physician Information:  Referring Practitioner: Dr. Silvino Gatica of care signed (Y/N):     Date of Patient follow up with Physician:      Progress Report: []  Yes  [x]  No     Functional Scale: LEFS = 67% disability Date: 3/3/22     LEFS = 42% disability  22    Date Range for reporting period:  Beginning:  3/3/22  Endin22    Progress report due (10 Rx/or 30 days whichever is less):      Recertification due (POC duration/ or 90 days whichever is less): 5/3/22     Visit # Insurance Allowable Auth Needed   18 30 []Yes    []No     Pain level:  0/10     SUBJECTIVE: 9+ weeks s/p. Pt reports that his knee was a little sore after last session, but dissipated by the following day. Pt reports still having mild swelling in the knee, but overall doing well.        OBJECTIVE:   Observation:    Test measurements:     3/15: steristrips intact, no drainage or overt s/sx of infection   3/28/22: L knee ext AROM = lacking 5 at beginning of session, 0 with PROM/OP; L knee flex AAROM = 113   22: L knee flex AAROM = 124 (w/wall slides)   22: L knee ext AROM = lacking 1 at rest, 0 with QS; flex AAROM = 135 (w/wall slides)   LEFS = 46/80 = 42% disability    Less visible distal patellar swelling, fat pad swelling today compared to Tuesdays session. Improved ambulation with no antalgia, has B varus alignment    RESTRICTIONS/PRECAUTIONS: s/p L ACLR w/ quad tendon allograft and partial lateral meniscectomy (2/25/22), hx of L ACLR (10 years ago)    Exercises/Interventions:   Therapeutic Ex 30' Resistance Sets/sec Reps Notes   Recumbent bike5'   4/26  Elliptical NPV? [facility] ABD  B 75# 3 10x 4/26   Seated heel slide with strap    1   15      TKE with 5 sec holds 75# 2 10 4/26       SLR +  30\" 3x 4/ 21   HS stretch at EOB off wedge  30\" 3x 4/28   Bridges  2 10 standing quad stretch  30\" 3x 4/26     IB calf stretch    30\"   3    HEP   Supine SLR flex 3# 2 10 With BFR   Hip abduction SLR  3#   With BFR   Squats on airex  2 10 4/ 28   Wall slides for knee flexion  5\" 15    BFR  8'  See below  SLR flex, SLR abd, fwd step ups, wall sits  4/ 21   Leg press  B 0/90  #   60# 2 10x 4/28   HS curls DL 35# 2 10    Knee extension iso's F 10\" 10x 4/ 28   Wall sits  w/BS 30\" 3 With BFR            Therapeutic Activities 5'        Forward step ups 6\" 1 15 With BFR          Sports cord circuit           Side steps L leading   1 10x                  Manual Intervention       Knee mobs/PROM Tib/Fem Mobs Patella Mobs          Distal HS, distal ITB          NMR re-education 15'       LSD    attempted on 2\" step today - unable to perform with anterior patella pain, so this was held    SLS airex 20\" 3x 4/ 21        Lateral stepping  Monster walks wine 1 2 laps    Fwd mini bosu lunge iso  2-3\" 15x 4/ 28   Retro slider lunges  1 15                       [facility] BFR Smart Phase Protocol  Spoke with Dr. Lore Lassiter regarding the use of BFR with patient.  BFR Session 6    [facility] Personalized Tourniquet System for BFR LE: up to 80% LOP  UE: up to 50% LOP   BFR Location: L thigh  BFR set at: 128 mmHg (70%)    Protocol: 30/15/15/15  Exercises:  Reps 30 sec Notes    1+ cycles   SLR flex 2# # of reps required  30x  4/28    completed  30 + 26 SLR abd 2# reps required  15  4/28    completed  15    Fwd step ups 6\" reps required  15 4/ 28    completed  15    Wall sits  reps required  30\"    completed  30\" + 30\"            Therapeutic Exercise and NMR EXR  [x] (23981) Provided verbal/tactile cueing for activities related to strengthening, flexibility, endurance, ROM for improvements in LE, proximal hip, and core control with self care, mobility, lifting, ambulation.  [] (13190) Provided verbal/tactile cueing for activities related to improving balance, coordination, kinesthetic sense, posture, motor skill, proprioception  to assist with LE, proximal hip, and core control in self care, mobility, lifting, ambulation and eccentric single leg control.      NMR and Therapeutic Activities:    [x] (94705 or 32685) Provided verbal/tactile cueing for activities related to improving balance, coordination, kinesthetic sense, posture, motor skill, proprioception and motor activation to allow for proper function of core, proximal hip and LE with self care and ADLs  [] (92380) Gait Re-education- Provided training and instruction to the patient for proper LE, core and proximal hip recruitment and positioning and eccentric body weight control with ambulation re-education including up and down stairs     Home Exercise Program:    [x] (61301) Reviewed/Progressed HEP activities related to strengthening, flexibility, endurance, ROM of core, proximal hip and LE for functional self-care, mobility, lifting and ambulation/stair navigation   [] (14746)Reviewed/Progressed HEP activities related to improving balance, coordination, kinesthetic sense, posture, motor skill, proprioception of core, proximal hip and LE for self care, mobility, lifting, and ambulation/stair navigation      Manual Treatments:  PROM / STM / Oscillations-Mobs:  G-I, II, III, IV (PA's, Inf., Post.)  [] (99299) Provided manual therapy to mobilize LE, proximal hip and/or LS spine soft tissue/joints for the purpose of modulating pain, promoting relaxation,  increasing ROM, reducing/eliminating soft tissue swelling/inflammation/restriction, improving soft tissue extensibility and allowing for proper ROM for normal function with self care, mobility, lifting and ambulation. Modalities: ice to go    Charges:  Timed Code Treatment Minutes: 50   Total Treatment Minutes: 50       [] EVAL (LOW) 28967 (typically 20 minutes face-to-face)  [] EVAL (MOD) 95146 (typically 30 minutes face-to-face)  [] EVAL (HIGH) 11937 (typically 45 minutes face-to-face)  [] RE-EVAL     [x] BD(52353) x 2   [] IONTO (68179)  [x] NMR (01146) x 1    [] VASO (14252)   [] Manual (22109) x     [] Other:  [] TA (22843)x     [] Mech Traction (34917)  [] ES(attended) (42727)     [] ES (un) (32317): If BWC Please Indicate Time In/Out and Total Minutes  CPT Code Time in Time out Total Min                                           GOALS:  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 Progressing: []? Met: []? Not Met: []? Adjusted     Therapist goals for Patient:   Short Term Goals: To be achieved in: 2 weeks  1. Independent in HEP and progression per patient tolerance, in order to prevent re-injury. []? Progressing: [x]? Met: []? Not Met: []? Adjusted  2. Patient will have a decrease in pain to facilitate improvement in movement, function, and ADLs as indicated by Functional Deficits. []? Progressing: [x]? Met: []? Not Met: []? Adjusted     Long Term Goals: To be achieved in: 8 weeks  1. Disability index score of 25% or less for the LEFS to assist with reaching prior level of function. [x]? Progressing: []? Met: []? Not Met: []? Adjusted  2. Patient will demonstrate increased AROM to WNL to allow for proper joint functioning as indicated by patients Functional Deficits. []? Progressing: [x]? Met: []? Not Met: []? Adjusted  3.  Patient will demonstrate an increase in Strength to good proximal hip strength and control, within 5lb HHD in LE to allow for proper functional mobility as indicated by patients Functional Deficits. [x]? Progressing: []? Met: []? Not Met: []? Adjusted  4. Patient will return to 15+ minutes of ambulation without increased symptoms or restriction to return to PLOF. [x]? Progressing: []? Met: []? Not Met: []? Adjusted  5. Patient will tolerate 25+ minutes of sitting without increased symptoms or restriction to return to PLOF. [x]? Progressing: []? Met: []? Not Met: []? Adjusted       Progression Towards Functional goals:  [x] Patient is progressing as expected towards functional goals listed. [] Progression is slowed due to complexities listed. [] Progression has been slowed due to co-morbidities. [] Plan just implemented, too soon to assess goals progression  [] Other:     ASSESSMENT:  Pt program progressed today due to less discomfort w/  patella and improved strength in quadriceps/ lower extremity muscle function. Held LSD today due to discomfort, but consider attempting again NPV earlier in the session. Progressed squats to be performed on airex/uneven surface and added monster walks with fatigue noted. Return to Play: (if applicable)   []  Stage 1: Intro to Strength   []  Stage 2: Return to Run and Strength   []  Stage 3: Return to Jump and Strength   []  Stage 4: Dynamic Strength and Agility   []  Stage 5: Sport Specific Training     []  Ready to Return to Play, Meets All Above Stages   []  Not Ready for Return to Sports   Comments:            Treatment/Activity Tolerance:  [x] Patient tolerated treatment well [x] Patient limited by fatique  [] Patient limited by pain  [] Patient limited by other medical complications  [] Other:     Overall Progression Towards Functional goals/ Treatment Progress Update:  [x] Patient is progressing as expected towards functional goals listed. [] Progression is slowed due to complexities/Impairments listed. [] Progression has been slowed due to co-morbidities.   [] Plan just implemented, too soon to assess goals progression <30days   [] Goals require adjustment due to lack of progress  [] Patient is not progressing as expected and requires additional follow up with physician  [] Other    Prognosis for POC: [x] Good [] Fair  [] Poor    Patient requires continued skilled intervention: [x] Yes  [] No        PLAN: Decrease pain, increase knee ROM, improve quad contraction, and increase strength per protocol. Pt advised to use knee sleeve when on his feet only. Avoid increasing compression forces with seated positions and monitor gait mechanics with normal pattern. Discussed progressing into a gym routine - pt considering joining gym at work/[employer]. [x] Continue per plan of care [] Alter current plan (see comments)  [] Plan of care initiated [] Hold pending MD visit [] Discharge    Electronically signed by:   Thuy Stanley PT    Note: If patient does not return for scheduled/recommended follow up visits, this note will serve as a discharge from care along with the most recent update on progress.

## 2022-05-10 ENCOUNTER — HOSPITAL ENCOUNTER (OUTPATIENT)
Dept: PHYSICAL THERAPY | Age: 32
Setting detail: THERAPIES SERIES
Discharge: HOME | End: 2022-05-10
Payer: COMMERCIAL

## 2022-05-10 PROCEDURE — 97140 MANUAL THERAPY 1/> REGIONS: CPT | Performed by: SPECIALIST/TECHNOLOGIST

## 2022-05-10 PROCEDURE — 97530 THERAPEUTIC ACTIVITIES: CPT | Performed by: SPECIALIST/TECHNOLOGIST

## 2022-05-10 PROCEDURE — 97110 THERAPEUTIC EXERCISES: CPT | Performed by: SPECIALIST/TECHNOLOGIST

## 2022-05-10 PROCEDURE — 97112 NEUROMUSCULAR REEDUCATION: CPT | Performed by: SPECIALIST/TECHNOLOGIST

## 2022-05-10 NOTE — FLOWSHEET NOTE
[employer]    Physical Therapy Treatment Note/ Progress Report:     Date:  5/10/2022    Patient Name:  Mini Gonzalez    :  1990  MRN: [id number]  Medical/Treatment Diagnosis Information:  · Diagnosis: S83.512D (ICD-10-CM) - Rupture of anterior cruciate ligament of left knee, subsequent [id number] (ICD-10-CM) - Tear of lateral meniscus of left knee, current, unspecified tear type, initial encounter  · Treatment Diagnosis: s/p ACLR w/ quad tendon allograft and partial lateral meniscectomy  Insurance/Certification information:  PT Insurance Information: [facility] - $2400 deductible, 80%/20% co-insurance, 30 PT visits per calendar year  Physician Information:  Referring Practitioner: Dr. Chitra Wilson of care signed (Y/N):     Date of Patient follow up with Physician:      Progress Report: []  Yes  [x]  No     Functional Scale: LEFS = 67% disability Date: 3/3/22     LEFS = 42% disability  22    Date Range for reporting period:  Beginning:  3/3/22  Endin22    Progress report due (10 Rx/or 30 days whichever is less): 75     Recertification due (POC duration/ or 90 days whichever is less): 5/3/22     Visit # Insurance Allowable Auth Needed   19 30 []Yes    []No     Pain level:  0/10     SUBJECTIVE:  10 weeks s/p. Pt reports knee feeling pretty good except having some tendon/ below patellar discomfort related to walking/ leg extension isometrics. Plans to officially join [employer] due to insurance limitations.    OBJECTIVE:   Observation:    Test measurements:     3/15: steristrips intact, no drainage or overt s/sx of infection   3/28/22: L knee ext AROM = lacking 5 at beginning of session, 0 with PROM/OP; L knee flex AAROM = 113   22: L knee flex AAROM = 124 (w/wall slides)   22: L knee ext AROM = lacking 1 at rest, 0 with QS; flex AAROM = 135 (w/wall slides)   LEFS = 46/80 = 42% disability   4/28/ 22 Less visible distal patellar swelling, fat pad swelling today compared to Tuesdays session.  Improved ambulation with no antalgia, has B varus alignment   5/10 Pt has mild TTP below the patella/ infrapatellar fat pad (hoffas fat pad)     RESTRICTIONS/PRECAUTIONS: s/p L ACLR w/ quad tendon allograft and partial lateral meniscectomy (2/25/22), hx of L ACLR (10 years ago)    Exercises/Interventions:   Therapeutic Ex 30' Resistance Sets/sec Reps Notes   Recumbent bike5'   Elliptical3' fwd/ bwd 6'5/10MH ABD/HIP flex  B 75# 3 10x 5/ 10    Standing Quad stretch     30\" 3x 5/ 10             SLR +  30\" 3x 4/ 21   HS stretch at EOB / IB  30\" 3x  5/ 10   Bridges SB 2 10 5/ 10              TR @ gym5/ 10 [employer]   Squats on airex  2 10 4/ 28   Leg press  B 0/90  #   60# 2 10x  GYM later 5/ 10   HS curls Ecc  25# 2 10x   5/ 10   Knee extension 90/ 45  B  25# 2 10x  5/10   Wall sits  w/BS  45\" 3  5/ 10            Therapeutic Activities 10'        Pt educated with RT gym with plan for strengthening, joint protection/ ROM limitations with leg extensions etc.Forward step ups 6\" 1 15           Sports cord circuit           Side steps L leading   1 10x                  Manual Intervention       Knee mobs/PROM Tib/Fem Mobs Patella Mobs        ASTM in seated  EOB8'  Infrapatellar knee/ hoffas fat pad           NMR re-education 15'       LSD     HOLD 5/10   SLS w/  3 ball toss  airex 10x in row   5/ 10        Lateral stepping  Monster walks wine 1 2 laps 5/ 10   Fwd mini bosu lunge iso  2-3\" 15x 4/ 28   Retro slider lunges  1 15x 5/ 10    Bosu lunges fwd  1 15x 5/ 10                                Therapeutic Exercise and NMR EXR  [x] (15684) Provided verbal/tactile cueing for activities related to strengthening, flexibility, endurance, ROM for improvements in LE, proximal hip, and core control with self care, mobility, lifting, ambulation.  [] (48706) Provided verbal/tactile cueing for activities related to improving balance, coordination, kinesthetic sense, posture, motor skill, proprioception  to assist with LE, proximal hip, and core control in self care, mobility, lifting, ambulation and eccentric single leg control. NMR and Therapeutic Activities:    [x] (09861 or 98377) Provided verbal/tactile cueing for activities related to improving balance, coordination, kinesthetic sense, posture, motor skill, proprioception and motor activation to allow for proper function of core, proximal hip and LE with self care and ADLs  [] (51544) Gait Re-education- Provided training and instruction to the patient for proper LE, core and proximal hip recruitment and positioning and eccentric body weight control with ambulation re-education including up and down stairs     Home Exercise Program:    [x] (17489) Reviewed/Progressed HEP activities related to strengthening, flexibility, endurance, ROM of core, proximal hip and LE for functional self-care, mobility, lifting and ambulation/stair navigation   [] (82631)Reviewed/Progressed HEP activities related to improving balance, coordination, kinesthetic sense, posture, motor skill, proprioception of core, proximal hip and LE for self care, mobility, lifting, and ambulation/stair navigation      Manual Treatments:  PROM / STM / Oscillations-Mobs:  G-I, II, III, IV (PA's, Inf., Post.)  [x] (32798) Provided manual therapy to mobilize LE, proximal hip and/or LS spine soft tissue/joints for the purpose of modulating pain, promoting relaxation,  increasing ROM, reducing/eliminating soft tissue swelling/inflammation/restriction, improving soft tissue extensibility and allowing for proper ROM for normal function with self care, mobility, lifting and ambulation.      Modalities: ce cup 8' to infrapatellar knee/ patella  inflammationo    Charges:  Timed Code Treatment Minutes: 55   Total Treatment Minutes: 63       [] EVAL (LOW) 51038 (typically 20 minutes face-to-face)  [] EVAL (MOD) 44176 (typically 30 minutes face-to-face)  [] EVAL (HIGH) 76850 (typically 45 minutes face-to-face)  [] RE-EVAL     [x] EL(83527) x 1   [] IONTO (48991)  [x] NMR (48154) x 1    [] VASO (44041)   [x] Manual (59905) x1     [] Other:  [x] TA (71052)x 1    [] Mech Traction (10339)  [] ES(attended) (64255)     [] ES (un) (63595): If BWC Please Indicate Time In/Out and Total Minutes  CPT Code Time in Time out Total Min                                           GOALS:  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 Progressing: []? Met: []? Not Met: []? Adjusted     Therapist goals for Patient:   Short Term Goals: To be achieved in: 2 weeks  1. Independent in HEP and progression per patient tolerance, in order to prevent re-injury. []? Progressing: [x]? Met: []? Not Met: []? Adjusted  2. Patient will have a decrease in pain to facilitate improvement in movement, function, and ADLs as indicated by Functional Deficits. []? Progressing: [x]? Met: []? Not Met: []? Adjusted     Long Term Goals: To be achieved in: 8 weeks  1. Disability index score of 25% or less for the LEFS to assist with reaching prior level of function. [x]? Progressing: []? Met: []? Not Met: []? Adjusted  2. Patient will demonstrate increased AROM to WNL to allow for proper joint functioning as indicated by patients Functional Deficits. []? Progressing: [x]? Met: []? Not Met: []? Adjusted  3. Patient will demonstrate an increase in Strength to good proximal hip strength and control, within 5lb HHD in LE to allow for proper functional mobility as indicated by patients Functional Deficits. [x]? Progressing: []? Met: []? Not Met: []? Adjusted  4. Patient will return to 15+ minutes of ambulation without increased symptoms or restriction to return to PLOF. [x]? Progressing: []? Met: []? Not Met: []? Adjusted  5. Patient will tolerate 25+ minutes of sitting without increased symptoms or restriction to return to PLOF. [x]?  Progressing: []? Met: []? Not Met: []? Adjusted       Progression Towards Functional goals:  [x] Patient is progressing as expected towards functional goals listed. [] Progression is slowed due to complexities listed. [] Progression has been slowed due to co-morbidities. [] Plan just implemented, too soon to assess goals progression  [] Other:     ASSESSMENT:  Pt program discussed with ability to go to [employer] to start tonight. Pt plans to push strength as able while monitoring knee for increased soreness with addition of leg extensions versus isometrics. Pt has no tendon pain but has some hoffa fat pad irritation with isolating SLS and had moments in session but tolerated all exercises well. Pt plans to ice more while stretching his quads more during the day. Return to Play: (if applicable)   []  Stage 1: Intro to Strength   []  Stage 2: Return to Run and Strength   []  Stage 3: Return to Jump and Strength   []  Stage 4: Dynamic Strength and Agility   []  Stage 5: Sport Specific Training     []  Ready to Return to Play, Meets All Above Stages   []  Not Ready for Return to Sports   Comments:            Treatment/Activity Tolerance:  [x] Patient tolerated treatment well [x] Patient limited by fatique  [] Patient limited by pain  [] Patient limited by other medical complications  [] Other:     Overall Progression Towards Functional goals/ Treatment Progress Update:  [x] Patient is progressing as expected towards functional goals listed. [] Progression is slowed due to complexities/Impairments listed. [] Progression has been slowed due to co-morbidities.   [] Plan just implemented, too soon to assess goals progression <30days   [] Goals require adjustment due to lack of progress  [] Patient is not progressing as expected and requires additional follow up with physician  [] Other    Prognosis for POC: [x] Good [] Fair  [] Poor    Patient requires continued skilled intervention: [x] Yes  [] No        PLAN: Decrease pain, increase knee ROM, improve quad contraction, and increase strength per protocol. Pt advised to use knee sleeve when on his feet only. Discussed progressing into a gym routine - pt considering joining gym at work/[employer] plans to start program tonight. [x] Continue per plan of care [] Alter current plan (see comments)  [] Plan of care initiated [] Hold pending MD visit [] Discharge    Electronically signed by:   Lara Keen, PTA, [id number]    Note: If patient does not return for scheduled/recommended follow up visits, this note will serve as a discharge from care along with the most recent update on progress.

## 2022-05-12 ENCOUNTER — HOSPITAL ENCOUNTER (OUTPATIENT)
Dept: PHYSICAL THERAPY | Age: 32
Setting detail: THERAPIES SERIES
Discharge: HOME | End: 2022-05-12
Payer: COMMERCIAL

## 2022-05-12 PROCEDURE — 97110 THERAPEUTIC EXERCISES: CPT

## 2022-05-12 PROCEDURE — 97112 NEUROMUSCULAR REEDUCATION: CPT

## 2022-05-12 NOTE — FLOWSHEET NOTE
[facility]    Physical Therapy Treatment Note/ Progress Report:     Date:  2022    Patient Name:  Mami Baxter    :  1990  MRN: [id number]  Medical/Treatment Diagnosis Information:  · Diagnosis: S83.512D (ICD-10-CM) - Rupture of anterior cruciate ligament of left knee, subsequent [id number] (ICD-10-CM) - Tear of lateral meniscus of left knee, current, unspecified tear type, initial encounter  · Treatment Diagnosis: s/p ACLR w/ quad tendon allograft and partial lateral meniscectomy  Insurance/Certification information:  PT Insurance Information: [place] - $2400 deductible, 80%/20% co-insurance, 30 PT visits per calendar year  Physician Information:  Referring Practitioner: Dr. Yolanda Velázquez of care signed (Y/N):     Date of Patient follow up with Physician:      Progress Report: []  Yes  [x]  No     Functional Scale: LEFS = 67% disability Date: 3/3/22     LEFS = 42% disability  22    Date Range for reporting period:  Beginning:  3/3/22  Endin22    Progress report due (10 Rx/or 30 days whichever is less):      Recertification due (POC duration/ or 90 days whichever is less): 5/3/22     Visit # Insurance Allowable Auth Needed   20 30 []Yes    []No     Pain level:  0/10     SUBJECTIVE:  10+ weeks s/p. Pt reports knee is doing well overall, but using step to pattern on steps due to discomfort when trying to negotiate more than a few steps using reciprocal pattern. Pt able to use reciprocal pattern for a few steps going up, but unable to do this when going down steps. Pt went to gym at [address] [place] [address] & [employer] Tuesday, no issues with machines but had minimal discomfort in infrapatella with rowing machine.     OBJECTIVE:   Observation:    Test measurements:     3/15: steristrips intact, no drainage or overt s/sx of infection   3/28/22: L knee ext AROM = lacking 5 at beginning of session, 0 with PROM/OP; L knee flex AAROM = 113   4/7/22: L knee flex AAROM = 124 (w/wall slides)   4/13/22: L knee ext AROM = lacking 1 at rest, 0 with QS; flex AAROM = 135 (w/wall slides)   LEFS = 46/80 = 42% disability   4/28/ 22 Less visible distal patellar swelling, fat pad swelling today compared to Tuesdays session.  Improved ambulation with no antalgia, has B varus alignment   5/10 Pt has mild TTP below the patella/ infrapatellar fat pad (hoffas fat pad)     RESTRICTIONS/PRECAUTIONS: s/p L ACLR w/ quad tendon allograft and partial lateral meniscectomy (2/25/22), hx of L ACLR (10 years ago)    Exercises/Interventions:   Therapeutic Ex 25' Resistance Sets/sec Reps Notes   Elliptical3' fwd/ bwd 6'5/10Incline calf stretch  Hamstring stretch EOB  Standing quad stretch  30\" 3x ea    MH ABD/HIP flex  B 75# 3 10x 5/ 10             SLR +  30\" 3x 4/ 21   Bridges SB 2 10 5/ 10              TR @ gym5/ 10 [employer]   Squats on airex  2 10 4/ 28   Leg press  B 0/90  #   60# 2 10x  GYM later 5/ 10   HS curls Ecc  25# 2 10x   5/ 10   Knee extension 90/ 45  B  25# 2 10x  5/10   Wall sits  w/BS  45\" 3  5/ 10            Therapeutic Activities 5'       Pt educated with RT gym with plan for strengthening, joint protection/ ROM limitations with leg extensions etc.Forward step ups with R LE flexion 6\" 1 15           Sports cord circuit           Side steps L leading   1 10x                  Manual Intervention       Knee mobs/PROM Tib/Fem Mobs Patella Mobs         Infrapatellar knee/ hoffas fat pad    Progress note NPV       NMR re-education 15'       LSD  2\" 1 15    SLS w/  3 ball toss  airex 10x in row   5/ 10        Lateral stepping  Monster walks wine 1 2 laps 5/ 10   Retro and lateral slider lunges  1 15x 5/ 10    Bosu lunges fwd  1 15x 5/ 10                                Therapeutic Exercise and NMR EXR  [x] (60821) Provided verbal/tactile cueing for activities related to strengthening, flexibility, endurance, ROM for improvements in LE, proximal hip, and core control with self care, mobility, lifting, ambulation.  [] (40646) Provided verbal/tactile cueing for activities related to improving balance, coordination, kinesthetic sense, posture, motor skill, proprioception  to assist with LE, proximal hip, and core control in self care, mobility, lifting, ambulation and eccentric single leg control. NMR and Therapeutic Activities:    [x] (85190 or 26867) Provided verbal/tactile cueing for activities related to improving balance, coordination, kinesthetic sense, posture, motor skill, proprioception and motor activation to allow for proper function of core, proximal hip and LE with self care and ADLs  [] (59883) Gait Re-education- Provided training and instruction to the patient for proper LE, core and proximal hip recruitment and positioning and eccentric body weight control with ambulation re-education including up and down stairs     Home Exercise Program:    [x] (47331) Reviewed/Progressed HEP activities related to strengthening, flexibility, endurance, ROM of core, proximal hip and LE for functional self-care, mobility, lifting and ambulation/stair navigation   [] (87318)Reviewed/Progressed HEP activities related to improving balance, coordination, kinesthetic sense, posture, motor skill, proprioception of core, proximal hip and LE for self care, mobility, lifting, and ambulation/stair navigation      Manual Treatments:  PROM / STM / Oscillations-Mobs:  G-I, II, III, IV (PA's, Inf., Post.)  [x] (97887) Provided manual therapy to mobilize LE, proximal hip and/or LS spine soft tissue/joints for the purpose of modulating pain, promoting relaxation,  increasing ROM, reducing/eliminating soft tissue swelling/inflammation/restriction, improving soft tissue extensibility and allowing for proper ROM for normal function with self care, mobility, lifting and ambulation.      Modalities:   Charges:  Timed Code Treatment Minutes: 45   Total Treatment Minutes: 45       [] UVALDO (LOW) 72241 (typically 20 minutes face-to-face)  [] EVAL (MOD) 27320 (typically 30 minutes face-to-face)  [] EVAL (HIGH) 54778 (typically 45 minutes face-to-face)  [] RE-EVAL     [x] TC(85662) x 2   [] IONTO (73639)  [x] NMR (04529) x 1    [] VASO (41732)   [] Manual (21513) x     [] Other:  [] TA (32424)x     [] Mech Traction (39659)  [] ES(attended) (27774)     [] ES (un) (89818): If BWC Please Indicate Time In/Out and Total Minutes  CPT Code Time in Time out Total Min                                           GOALS:  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 Progressing: []? Met: []? Not Met: []? Adjusted     Therapist goals for Patient:   Short Term Goals: To be achieved in: 2 weeks  1. Independent in HEP and progression per patient tolerance, in order to prevent re-injury. []? Progressing: [x]? Met: []? Not Met: []? Adjusted  2. Patient will have a decrease in pain to facilitate improvement in movement, function, and ADLs as indicated by Functional Deficits. []? Progressing: [x]? Met: []? Not Met: []? Adjusted     Long Term Goals: To be achieved in: 8 weeks  1. Disability index score of 25% or less for the LEFS to assist with reaching prior level of function. [x]? Progressing: []? Met: []? Not Met: []? Adjusted  2. Patient will demonstrate increased AROM to WNL to allow for proper joint functioning as indicated by patients Functional Deficits. []? Progressing: [x]? Met: []? Not Met: []? Adjusted  3. Patient will demonstrate an increase in Strength to good proximal hip strength and control, within 5lb HHD in LE to allow for proper functional mobility as indicated by patients Functional Deficits. [x]? Progressing: []? Met: []? Not Met: []? Adjusted  4. Patient will return to 15+ minutes of ambulation without increased symptoms or restriction to return to PLOF. [x]? Progressing: []? Met: []? Not Met: []? Adjusted  5.  Patient will tolerate 25+ minutes of sitting without increased symptoms or restriction to return to PLOF. [x]? Progressing: []? Met: []? Not Met: []? Adjusted       Progression Towards Functional goals:  [x] Patient is progressing as expected towards functional goals listed. [] Progression is slowed due to complexities listed. [] Progression has been slowed due to co-morbidities. [] Plan just implemented, too soon to assess goals progression  [] Other:     ASSESSMENT:  Pt was able to perform about 15 reps of LSD's on 2\" step today before having mild infrapatellar discomfort, so only 15 reps were performed. Progressed eccentric quad and glut strengthening with fatigue noted and cues required to maintain appropriate form with exercises. Pt continues to require further skilled PT services to address eccentric quad and glut strength in order to return to PLOF. Return to Play: (if applicable)   []  Stage 1: Intro to Strength   []  Stage 2: Return to Run and Strength   []  Stage 3: Return to Jump and Strength   []  Stage 4: Dynamic Strength and Agility   []  Stage 5: Sport Specific Training     []  Ready to Return to Play, Meets All Above Stages   []  Not Ready for Return to Sports   Comments:            Treatment/Activity Tolerance:  [x] Patient tolerated treatment well [x] Patient limited by fatique  [] Patient limited by pain  [] Patient limited by other medical complications  [] Other:     Overall Progression Towards Functional goals/ Treatment Progress Update:  [x] Patient is progressing as expected towards functional goals listed. [] Progression is slowed due to complexities/Impairments listed. [] Progression has been slowed due to co-morbidities.   [] Plan just implemented, too soon to assess goals progression <30days   [] Goals require adjustment due to lack of progress  [] Patient is not progressing as expected and requires additional follow up with physician  [] Other    Prognosis for POC: [x] Good [] Fair  [] Poor    Patient requires continued skilled intervention: [x] Yes  [] No        PLAN: Decrease pain, increase knee ROM, improve quad contraction, and increase strength per protocol. Pt advised to use knee sleeve when on his feet only. Pt to continue with gym program 1-2x/week in addition to PT. [x] Continue per plan of care [] Alter current plan (see comments)  [] Plan of care initiated [] Hold pending MD visit [] Discharge    Electronically signed by:   Chance Mcfadden PT    Note: If patient does not return for scheduled/recommended follow up visits, this note will serve as a discharge from care along with the most recent update on progress.

## 2022-05-16 ENCOUNTER — HOSPITAL ENCOUNTER (OUTPATIENT)
Dept: PHYSICAL THERAPY | Age: 32
Setting detail: THERAPIES SERIES
Discharge: HOME | End: 2022-05-16
Payer: COMMERCIAL

## 2022-05-16 PROCEDURE — 97530 THERAPEUTIC ACTIVITIES: CPT

## 2022-05-16 PROCEDURE — 97110 THERAPEUTIC EXERCISES: CPT

## 2022-05-16 PROCEDURE — 97112 NEUROMUSCULAR REEDUCATION: CPT

## 2022-05-16 NOTE — PROGRESS NOTES
[facility]     Physical Therapy Re-Certification Plan of Care    Dear Dr. Sylvia Lopez,    We had the pleasure of treating the following patient for physical therapy services at [address] [place] [address]. A summary of our findings can be found in the updated assessment below. This includes our plan of care. If you have any questions or concerns regarding these findings, please do not hesitate to contact me at the office phone number checked above. Thank you for the referral.     Physician Signature:________________________________Date:__________________  By signing above (or electronic signature), therapists plan is approved by physician    Date Range Of Visits: 3/3/22 - 22  Total Visits to Date: 21  Overall Response to Treatment:   [x]Patient is responding well to treatment and improvement is noted with regards  to goals   []Patient should continue to improve in reasonable time if they continue HEP   []Patient has plateaued and is no longer responding to skilled PT intervention    []Patient is getting worse and would benefit from return to referring MD   []Patient unable to adhere to initial POC   [x]Other: Pt demonstrates improving function in L knee. Pt demonstrates good ROM, but continues to demonstrate strength and endurance deficits in L LE, especially with functional eccentric quad strengthening activities. Pt does also continue to have some swelling in L knee, particularly after prolonged walking and weight-bearing activities. Recommend continuing with PT 1-2x/week x 6 weeks.       Physical Therapy Treatment Note/ Progress Report:     Date:  2022    Patient Name:  Zulema Amin    :  1990  MRN: [id number]  Medical/Treatment Diagnosis Information:  · Diagnosis: S83.512D (ICD-10-CM) - Rupture of anterior cruciate ligament of left knee, subsequent [id number] (ICD-10-CM) - Tear of lateral meniscus of left knee, current, unspecified tear type, initial encounter  · Treatment Diagnosis: s/p ACLR w/ quad tendon allograft and partial lateral meniscectomy  Insurance/Certification information:  PT Insurance Information: [facility] - $2400 deductible, 80%/20% co-insurance, 30 PT visits per calendar year  Physician Information:  Referring Practitioner: Dr. Familia Armendariz of care signed (Y/N):     Date of Patient follow up with Physician:      Progress Report: []  Yes  [x]  No     Functional Scale: LEFS = 67% disability Date: 3/3/22     LEFS = 42% disability  22    Date Range for reporting period:  Beginnin22  Endin22    Progress report due (10 Rx/or 30 days whichever is less):      Recertification due (POC duration/ or 90 days whichever is less): 22     Visit # Insurance Allowable Auth Needed   21 30 []Yes    []No     Pain level:  0/10     SUBJECTIVE:  10+ weeks s/p. Patient reports that his knee feels pretty good this session. States that he had a lot of activity this past weekend going to graduation ceremonies and especially with walking at a theme park. States that going up stairs is getting a little a easier but still utilizes step to pattern descending. Has appointment with Dr. Belinda Salcedo on . Was able to complete HEP at gym with less pain and clicking with rowing machine. OBJECTIVE:   Observation:    Test measurements:     3/15: steristrips intact, no drainage or overt s/sx of infection   3/28/22: L knee ext AROM = lacking 5 at beginning of session, 0 with PROM/OP; L knee flex AAROM = 113   22: L knee flex AAROM = 124 (w/wall slides)   22: L knee ext AROM = lacking 1 at rest, 0 with QS; flex AAROM = 135 (w/wall slides)   LEFS = 46/80 = 42% disability    Less visible distal patellar swelling, fat pad swelling today compared to  session.  Improved ambulation with no antalgia, has B varus alignment   5/10 Pt has mild TTP below the patella/ infrapatellar fat pad (hoffas fat pad)    5/16 -   MMT = R knee flexion and extension = 5/5 . MMT L knee flexion and extension = 5-/5   LEFS = 47/80 = 41% disability. Mid patella circumferencal measurements R = 39.25 cm. L = 41.0 cm.      RESTRICTIONS/PRECAUTIONS: s/p L ACLR w/ quad tendon allograft and partial lateral meniscectomy (2/25/22), hx of L ACLR (10 years ago)    Exercises/Interventions:   Therapeutic Ex 18' Resistance Sets/sec Reps Notes   Elliptical3' fwd/ bwd 6'5/10Incline calf stretch  Hamstring stretch EOB  Standing quad stretch  30\" 3x ea    MH ABD/HIP flex  B 75# 3 10x 5/ 10   TKE  75# 2 10  5 sec       SLR +  30\" 3x 4/ 21   Bridges SB 2 10 5/ 10              TR @ gym5/ 10 [employer]   Squats on airex  2 10 4/ 28   Leg press  B 0/90  #   70# 2 10x  GYM later 5/ 10   HS curls Ecc  25# 2 10x   5/ 10   Knee extension 90/ 45  B  25# 2 10x  5/10   Wall sits  w/BS  45\" 3  5/ 10            Therapeutic Activities 10'       Pt educated with RT gym with plan for strengthening, joint protection/ ROM limitations with leg extensions etc.Forward step ups with R LE flexion 6\" 1 15    Objective measures  6'      Sports cord circuit           Side steps L leading   1 10x                  Manual Intervention       Knee mobs/PROM Tib/Fem Mobs Patella Mobs         Infrapatellar knee/ hoffas fat pad           NMR re-education 22'       LSD  2\" 1 15    SLS w/  3 ball toss  airex 10x in row   5/ 10        Lateral stepping  Monster walks wine 1 2 laps 5/ 10   Retro and lateral slider lunges  1 15x 5/ 10    Bosu lunges fwd  1 15x 5/ 10                                Therapeutic Exercise and NMR EXR  [x] (32276) Provided verbal/tactile cueing for activities related to strengthening, flexibility, endurance, ROM for improvements in LE, proximal hip, and core control with self care, mobility, lifting, ambulation.  [] (19059) Provided verbal/tactile cueing for activities related to improving balance, coordination, kinesthetic sense, posture, motor skill, proprioception  to assist with LE, proximal hip, and core control in self care, mobility, lifting, ambulation and eccentric single leg control. NMR and Therapeutic Activities:    [x] (74768 or 32507) Provided verbal/tactile cueing for activities related to improving balance, coordination, kinesthetic sense, posture, motor skill, proprioception and motor activation to allow for proper function of core, proximal hip and LE with self care and ADLs  [] (02085) Gait Re-education- Provided training and instruction to the patient for proper LE, core and proximal hip recruitment and positioning and eccentric body weight control with ambulation re-education including up and down stairs     Home Exercise Program:    [x] (72573) Reviewed/Progressed HEP activities related to strengthening, flexibility, endurance, ROM of core, proximal hip and LE for functional self-care, mobility, lifting and ambulation/stair navigation   [] (85510)Reviewed/Progressed HEP activities related to improving balance, coordination, kinesthetic sense, posture, motor skill, proprioception of core, proximal hip and LE for self care, mobility, lifting, and ambulation/stair navigation      Manual Treatments:  PROM / STM / Oscillations-Mobs:  G-I, II, III, IV (PA's, Inf., Post.)  [x] (21556) Provided manual therapy to mobilize LE, proximal hip and/or LS spine soft tissue/joints for the purpose of modulating pain, promoting relaxation,  increasing ROM, reducing/eliminating soft tissue swelling/inflammation/restriction, improving soft tissue extensibility and allowing for proper ROM for normal function with self care, mobility, lifting and ambulation.      Modalities:   Charges:  Timed Code Treatment Minutes: 50   Total Treatment Minutes: 50       [] EVAL (LOW) 41924 (typically 20 minutes face-to-face)  [] EVAL (MOD) 33158 (typically 30 minutes face-to-face)  [] EVAL (HIGH) 77580 (typically 45 minutes face-to-face)  [] RE-EVAL [x] OK(11281) x 1   [] IONTO (01696)  [x] NMR (63322) x 1    [] VASO (82785)   [] Manual (39383) x     [] Other:  [x] TA (37358)x 1     [] Mech Traction (60749)  [] ES(attended) (53528)     [] ES (un) (53247): If BWC Please Indicate Time In/Out and Total Minutes  CPT Code Time in Time out Total Min                                           GOALS:  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 Progressing: []? Met: []? Not Met: []? Adjusted     Therapist goals for Patient:   Short Term Goals: To be achieved in: 2 weeks  1. Independent in HEP and progression per patient tolerance, in order to prevent re-injury. []? Progressing: [x]? Met: []? Not Met: []? Adjusted  2. Patient will have a decrease in pain to facilitate improvement in movement, function, and ADLs as indicated by Functional Deficits. []? Progressing: [x]? Met: []? Not Met: []? Adjusted     Long Term Goals: To be achieved in: 8 weeks  1. Disability index score of 25% or less for the LEFS to assist with reaching prior level of function. [x]? Progressing: []? Met: []? Not Met: []? Adjusted  2. Patient will demonstrate increased AROM to WNL to allow for proper joint functioning as indicated by patients Functional Deficits. []? Progressing: [x]? Met: []? Not Met: []? Adjusted  3. Patient will demonstrate an increase in Strength to good proximal hip strength and control, within 5lb HHD in LE to allow for proper functional mobility as indicated by patients Functional Deficits. [x]? Progressing: []? Met: []? Not Met: []? Adjusted  4. Patient will return to 15+ minutes of ambulation without increased symptoms or restriction to return to PLOF. []? Progressing: [x]? Met: []? Not Met: []? Adjusted  5. Patient will tolerate 25+ minutes of sitting without increased symptoms or restriction to return to PLOF. []? Progressing: [x]? Met: []? Not Met: []?  Adjusted       Progression Towards Functional goals:  [x] Patient is progressing as expected towards functional goals listed. [] Progression is slowed due to complexities listed. [] Progression has been slowed due to co-morbidities. [] Plan just implemented, too soon to assess goals progression  [] Other:     ASSESSMENT:   Pt presents with increased swelling in L knee and deficits in ROM and strength. Subjective reporting indicative of notable progress with PT although LEFS scores shows minimal improvement in function. LSDs attempted at 4 inches this session but patient complains of pain in infrapatellar region. Progressions for leg press well tolerated. TKEs re-introduced this session to facilitate quad control and for transfer for stair negotiation. Walking tolerance is much improved. Pt is progressing although slightly slower then anticipated, also not first ACLR on L knee. Patient requires continued progressive strengthening and proprioceptive interventions to continue to improve function. Return to Play: (if applicable)   []  Stage 1: Intro to Strength   []  Stage 2: Return to Run and Strength   []  Stage 3: Return to Jump and Strength   []  Stage 4: Dynamic Strength and Agility   []  Stage 5: Sport Specific Training     []  Ready to Return to Play, Meets All Above Stages   []  Not Ready for Return to Sports   Comments:            Treatment/Activity Tolerance:  [x] Patient tolerated treatment well [x] Patient limited by fatique  [] Patient limited by pain  [] Patient limited by other medical complications  [] Other:     Overall Progression Towards Functional goals/ Treatment Progress Update:  [x] Patient is progressing as expected towards functional goals listed. [] Progression is slowed due to complexities/Impairments listed. [] Progression has been slowed due to co-morbidities.   [] Plan just implemented, too soon to assess goals progression <30days   [] Goals require adjustment due to lack of progress  [] Patient is not progressing as expected and requires additional follow up with physician  [] Other    Prognosis for POC: [x] Good [] Fair  [] Poor    Patient requires continued skilled intervention: [x] Yes  [] No        PLAN: Decrease pain, increase knee ROM, improve quad contraction, and increase strength per protocol. Pt advised to use knee sleeve when on his feet only. Pt to continue with gym program 1-2x/week in addition to PT. [x] Continue per plan of care [] Alter current plan (see comments)  [] Plan of care initiated [] Hold pending MD visit [] Discharge    Electronically signed by:   Erika Degroot, SPT     Therapist was present, directed the patient's care, made skilled judgement, and was responsible for assessment and treatment of the patient. Tara Cuba, PT     Note: If patient does not return for scheduled/recommended follow up visits, this note will serve as a discharge from care along with the most recent update on progress.

## 2022-05-17 ENCOUNTER — TELEPHONE (OUTPATIENT)
Dept: PULMONOLOGY | Age: 32
End: 2022-05-17

## 2022-05-18 ENCOUNTER — HOSPITAL ENCOUNTER (OUTPATIENT)
Dept: PHYSICAL THERAPY | Age: 32
Setting detail: THERAPIES SERIES
Discharge: HOME | End: 2022-05-18
Payer: COMMERCIAL

## 2022-05-18 PROCEDURE — 97110 THERAPEUTIC EXERCISES: CPT

## 2022-05-18 PROCEDURE — 97112 NEUROMUSCULAR REEDUCATION: CPT

## 2022-05-18 NOTE — FLOWSHEET NOTE
[employer]    Physical Therapy Treatment Note/ Progress Report:     Date:  2022    Patient Name:  Fernando Ludwig    :  1990  MRN: [id number]  Medical/Treatment Diagnosis Information:  · Diagnosis: S83.512D (ICD-10-CM) - Rupture of anterior cruciate ligament of left knee, subsequent [id number] (ICD-10-CM) - Tear of lateral meniscus of left knee, current, unspecified tear type, initial encounter  · Treatment Diagnosis: s/p ACLR w/ quad tendon allograft and partial lateral meniscectomy  Insurance/Certification information:  PT Insurance Information: [facility] - $2400 deductible, 80%/20% co-insurance, 30 PT visits per calendar year  Physician Information:  Referring Practitioner: Dr. Floyd Ricketts of care signed (Y/N):     Date of Patient follow up with Physician:      Progress Report: []  Yes  [x]  No     Functional Scale: LEFS = 67% disability Date: 3/3/22     LEFS = 42% disability  22    Date Range for reporting period:  Beginnin22  Endin22    Progress report due (10 Rx/or 30 days whichever is less):      Recertification due (POC duration/ or 90 days whichever is less): 22     Visit # Insurance Allowable Auth Needed    []Yes    []No     Pain level:  0/10     SUBJECTIVE: Patient reports a little bit of soreness this morning in his superior anterior L knee states it is dull denies pain. States he was able to perform a modified version of his Hep last night in the gym with no increased in pain. Still reports stairs are about the same difficulty as from last session.        OBJECTIVE:   Observation:    Test measurements:     3/15: steristrips intact, no drainage or overt s/sx of infection   3/28/22: L knee ext AROM = lacking 5 at beginning of session, 0 with PROM/OP; L knee flex AAROM = 113   22: L knee flex AAROM = 124 (w/wall slides)   22: L knee ext AROM = lacking 1 at rest, 0 with QS; flex AAROM = 135 (w/wall slides)   LEFS = 46/80 = 42% disability   4/28/ 22 Less visible distal patellar swelling, fat pad swelling today compared to Tuesdays session. Improved ambulation with no antalgia, has B varus alignment   5/10 Pt has mild TTP below the patella/ infrapatellar fat pad (hoffas fat pad)    5/16 -   MMT = R knee flexion and extension = 5/5 . MMT L knee flexion and extension = 5-/5   LEFS = 47/80 = 41% disability. Mid patella circumferencal measurements R = 39.25 cm. L = 41.0 cm.      RESTRICTIONS/PRECAUTIONS: s/p L ACLR w/ quad tendon allograft and partial lateral meniscectomy (2/25/22), hx of L ACLR (10 years ago)    Exercises/Interventions:   Therapeutic Ex 31' Resistance Sets/sec Reps Notes   Elliptical3' fwd/ bwd 6'5/10Incline calf stretch  Hamstring stretch EOB  Standing quad stretch  30\" 3x ea    MH ABD/HIP flex  B 75# 3 10x 5/ 10   TKE  75# 2 10  5 sec       SLR +  30\" 3x 4/ 21   Bridges SB 2 10 5/ 10              TR @ gym5/ 10 [employer]   Squats on airex  2 10 4/ 28   Leg press  B 0/90  #   70# 2 10x  GYM later 5/ 10   HS curls Ecc  25# 2 10x   5/ 10   Knee extension 90/ 45  B  25# 2 10x  5/10   Wall sits  w/BS  45\" 3  5/ 10            Therapeutic Activities 0'       Pt educated with RT gym with plan for strengthening, joint protection/ ROM limitations with leg extensions etc.Forward step ups with R LE flexion 6\" 1 15    Objective measures  6'      Sports cord circuit           Side steps L leading   1 10x                  Manual Intervention 0'       Knee mobs/PROM Tib/Fem Mobs Patella Mobs         Infrapatellar knee/ hoffas fat pad           NMR re-education 30'       LSD  2\" 1 15    SLS w/  3 ball toss  airex 10x in row   5/ 10        Lateral stepping  Monster walks wine 1 2 laps 5/ 10   Retro and lateral slider lunges  1 15x 5/ 10    Bosu lunges fwd  1 15x 5/ 10   Ladder drills slow  5'   Lateral, sideways, forward, backward   Step up with tactile cueing from SPT for quad control  4' 2 10    3/4 squats with TRX bands   1 10     Quarter squats with TRX bands   1 10                          Therapeutic Exercise and NMR EXR  [x] (22457) Provided verbal/tactile cueing for activities related to strengthening, flexibility, endurance, ROM for improvements in LE, proximal hip, and core control with self care, mobility, lifting, ambulation.  [] (24884) Provided verbal/tactile cueing for activities related to improving balance, coordination, kinesthetic sense, posture, motor skill, proprioception  to assist with LE, proximal hip, and core control in self care, mobility, lifting, ambulation and eccentric single leg control.      NMR and Therapeutic Activities:    [x] (64482 or 42105) Provided verbal/tactile cueing for activities related to improving balance, coordination, kinesthetic sense, posture, motor skill, proprioception and motor activation to allow for proper function of core, proximal hip and LE with self care and ADLs  [] (92610) Gait Re-education- Provided training and instruction to the patient for proper LE, core and proximal hip recruitment and positioning and eccentric body weight control with ambulation re-education including up and down stairs     Home Exercise Program:    [x] (71507) Reviewed/Progressed HEP activities related to strengthening, flexibility, endurance, ROM of core, proximal hip and LE for functional self-care, mobility, lifting and ambulation/stair navigation   [] (06668)Reviewed/Progressed HEP activities related to improving balance, coordination, kinesthetic sense, posture, motor skill, proprioception of core, proximal hip and LE for self care, mobility, lifting, and ambulation/stair navigation      Manual Treatments:  PROM / STM / Oscillations-Mobs:  G-I, II, III, IV (PA's, Inf., Post.)  [x] (32628) Provided manual therapy to mobilize LE, proximal hip and/or LS spine soft tissue/joints for the purpose of modulating pain, promoting relaxation, increasing ROM, reducing/eliminating soft tissue swelling/inflammation/restriction, improving soft tissue extensibility and allowing for proper ROM for normal function with self care, mobility, lifting and ambulation. Modalities:   Charges:  Timed Code Treatment Minutes: 61   Total Treatment Minutes: 61       [] EVAL (LOW) 49090 (typically 20 minutes face-to-face)  [] EVAL (MOD) 60911 (typically 30 minutes face-to-face)  [] EVAL (HIGH) 78495 (typically 45 minutes face-to-face)  [] RE-EVAL     [x] WC(51197) x 2   [] IONTO (18084)  [x] NMR (06393) x 2    [] VASO (15325)   [] Manual (76510) x     [] Other:  [] TA (61235)x     [] Mech Traction (69971)  [] ES(attended) (31988)     [] ES (un) (33029): If BWC Please Indicate Time In/Out and Total Minutes  CPT Code Time in Time out Total Min                                           GOALS:  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 Progressing: []? Met: []? Not Met: []? Adjusted     Therapist goals for Patient:   Short Term Goals: To be achieved in: 2 weeks  1. Independent in HEP and progression per patient tolerance, in order to prevent re-injury. []? Progressing: [x]? Met: []? Not Met: []? Adjusted  2. Patient will have a decrease in pain to facilitate improvement in movement, function, and ADLs as indicated by Functional Deficits. []? Progressing: [x]? Met: []? Not Met: []? Adjusted     Long Term Goals: To be achieved in: 8 weeks  1. Disability index score of 25% or less for the LEFS to assist with reaching prior level of function. [x]? Progressing: []? Met: []? Not Met: []? Adjusted  2. Patient will demonstrate increased AROM to WNL to allow for proper joint functioning as indicated by patients Functional Deficits. []? Progressing: [x]? Met: []? Not Met: []? Adjusted  3.  Patient will demonstrate an increase in Strength to good proximal hip strength and control, within 5lb HHD in LE to allow for proper functional mobility as indicated by patients Functional Deficits. [x]? Progressing: []? Met: []? Not Met: []? Adjusted  4. Patient will return to 15+ minutes of ambulation without increased symptoms or restriction to return to PLOF. []? Progressing: [x]? Met: []? Not Met: []? Adjusted  5. Patient will tolerate 25+ minutes of sitting without increased symptoms or restriction to return to PLOF. []? Progressing: [x]? Met: []? Not Met: []? Adjusted       Progression Towards Functional goals:  [x] Patient is progressing as expected towards functional goals listed. [] Progression is slowed due to complexities listed. [] Progression has been slowed due to co-morbidities. [] Plan just implemented, too soon to assess goals progression  [] Other:     ASSESSMENT: Focus of session on single leg stability and quad control. Planned for four inch LSD but increased pain with knee flexion LSD. Modification made to include 1/4 sl squat with UE support with TRX. Patient tolerates all other interventions and progressions including SL leg press. Performance improves with step ups with SPT tactile cueing, pt occasionally will go into genu recurvatum with 4 inch step ups indicating continued quad control need. Improved single leg balance noted with ball toss on airex. Return to Play: (if applicable)   []  Stage 1: Intro to Strength   []  Stage 2: Return to Run and Strength   []  Stage 3: Return to Jump and Strength   []  Stage 4: Dynamic Strength and Agility   []  Stage 5: Sport Specific Training     []  Ready to Return to Play, Meets All Above Stages   []  Not Ready for Return to Sports   Comments:            Treatment/Activity Tolerance:  [x] Patient tolerated treatment well [x] Patient limited by fatique  [] Patient limited by pain  [] Patient limited by other medical complications  [] Other:     Overall Progression Towards Functional goals/ Treatment Progress Update:  [x] Patient is progressing as expected towards functional goals listed.     [] Progression is slowed due to complexities/Impairments listed. [] Progression has been slowed due to co-morbidities. [] Plan just implemented, too soon to assess goals progression <30days   [] Goals require adjustment due to lack of progress  [] Patient is not progressing as expected and requires additional follow up with physician  [] Other    Prognosis for POC: [x] Good [] Fair  [] Poor    Patient requires continued skilled intervention: [x] Yes  [] No        PLAN: Decrease pain, increase knee ROM, improve quad contraction, and increase strength per protocol. Pt advised to use knee sleeve when on his feet only. Pt to continue with gym program 1-2x/week in addition to PT. [x] Continue per plan of care [] Alter current plan (see comments)  [] Plan of care initiated [] Hold pending MD visit [] Discharge    Electronically signed by:   Ramos Mruray, SPT     Therapist was present, directed the patient's care, made skilled judgement, and was responsible for assessment and treatment of the patient. Chris Littlejohn, PT     Note: If patient does not return for scheduled/recommended follow up visits, this note will serve as a discharge from care along with the most recent update on progress.

## 2022-05-25 ENCOUNTER — HOSPITAL ENCOUNTER (OUTPATIENT)
Dept: PHYSICAL THERAPY | Age: 32
Setting detail: THERAPIES SERIES
Discharge: HOME | End: 2022-05-25
Payer: COMMERCIAL

## 2022-05-25 PROCEDURE — 97110 THERAPEUTIC EXERCISES: CPT | Performed by: SPECIALIST/TECHNOLOGIST

## 2022-05-25 PROCEDURE — 97530 THERAPEUTIC ACTIVITIES: CPT | Performed by: SPECIALIST/TECHNOLOGIST

## 2022-05-25 NOTE — FLOWSHEET NOTE
slides)   4/13/22: L knee ext AROM = lacking 1 at rest, 0 with QS; flex AAROM = 135 (w/wall slides)   LEFS = 46/80 = 42% disability   4/28/ 22 Less visible distal patellar swelling, fat pad swelling today compared to Tuesdays session. Improved ambulation with no antalgia, has B varus alignment   5/10 Pt has mild TTP below the patella/ infrapatellar fat pad (hoffas fat pad)    5/16 -   MMT = R knee flexion and extension = 5/5 . MMT L knee flexion and extension = 5-/5   LEFS = 47/80 = 41% disability. Mid patella circumferencal measurements R = 39.25 cm. L = 41.0 cm.      RESTRICTIONS/PRECAUTIONS: s/p L ACLR w/ quad tendon allograft and partial lateral meniscectomy (2/25/22), hx of L ACLR (10 years ago)    Exercises/Interventions:   Therapeutic Ex 31' Resistance Sets/sec Reps Notes   Elliptical3' fwd/ bwd 6'5/10Incline calf stretch  Hamstring stretch EOB  Standing quad stretch  30\" 3x ea    MH ABD/HIP flex  B 75# 3 10x 5/25   TKE  75# 2 10  5 sec       SLR +  30\" 3x 4/ 21   Bridges SB 2 10 5/ 10              TR @ gym5/ 10 [employer]   Squats on airex  2 10 4/ 28   Leg press  ECC 0/90  #   70# 2 10x   5/25   HS curls Ecc  25# 2 10x   5/ 10   Knee extension 90/ 45  B  25# 2 10x  5/25 w/ discomfort   Wall sits  w/BS  45\" 3  5/ 10            Therapeutic Activities 0'       Pt educated with RT gym with plan for strengthening, joint protection/ ROM limitations with leg extensions etc.Forward step ups with R LE flexion 6\" 1 15    Objective measures  6'      Sports cord circuit           Side steps L leading   1 10x                  Manual Intervention 0'       Knee mobs/PROM Tib/Fem Mobs Patella Mobs         Infrapatellar knee/ hoffas fat pad           NMR re-education 30'       LSD  2\" 1 15    SLS w/  3 ball toss  airex 10x in row   5/ 10        Lateral stepping  Monster walks wine 1 2 laps 5/ 10   Retro and lateral slider lunges  1 15x 5/ 10    Bosu lunges fwd  1 15x 5/ 10   Ladder drills slow  5'   Lateral, sideways, forward, backward   Step up with tactile cueing from SPT for quad control  4' 2 10    3/4 squats with TRX bands   1 10     Quarter squats with TRX bands   1 10                          Therapeutic Exercise and NMR EXR  [x] (47057) Provided verbal/tactile cueing for activities related to strengthening, flexibility, endurance, ROM for improvements in LE, proximal hip, and core control with self care, mobility, lifting, ambulation.  [] (79695) Provided verbal/tactile cueing for activities related to improving balance, coordination, kinesthetic sense, posture, motor skill, proprioception  to assist with LE, proximal hip, and core control in self care, mobility, lifting, ambulation and eccentric single leg control.      NMR and Therapeutic Activities:    [x] (17260 or 72665) Provided verbal/tactile cueing for activities related to improving balance, coordination, kinesthetic sense, posture, motor skill, proprioception and motor activation to allow for proper function of core, proximal hip and LE with self care and ADLs  [] (00434) Gait Re-education- Provided training and instruction to the patient for proper LE, core and proximal hip recruitment and positioning and eccentric body weight control with ambulation re-education including up and down stairs     Home Exercise Program:    [x] (64864) Reviewed/Progressed HEP activities related to strengthening, flexibility, endurance, ROM of core, proximal hip and LE for functional self-care, mobility, lifting and ambulation/stair navigation   [] (06677)Reviewed/Progressed HEP activities related to improving balance, coordination, kinesthetic sense, posture, motor skill, proprioception of core, proximal hip and LE for self care, mobility, lifting, and ambulation/stair navigation      Manual Treatments:  PROM / STM / Oscillations-Mobs:  G-I, II, III, IV (PA's, Inf., Post.)  [x] (29827) Provided manual therapy to mobilize LE, proximal hip and/or LS spine soft tissue/joints for the purpose of modulating pain, promoting relaxation,  increasing ROM, reducing/eliminating soft tissue swelling/inflammation/restriction, improving soft tissue extensibility and allowing for proper ROM for normal function with self care, mobility, lifting and ambulation. Modalities: ice cup 8' to infrapatellar knee/ patella  Charges:  Timed Code Treatment Minutes: 45   Total Treatment Minutes: 53       [] EVAL (LOW) 83696 (typically 20 minutes face-to-face)  [] EVAL (MOD) 04359 (typically 30 minutes face-to-face)  [] EVAL (HIGH) 66058 (typically 45 minutes face-to-face)  [] RE-EVAL     [x] CY(72645) x 2   [] IONTO (87864)  [] NMR (87671) x     [] VASO (19975)   [] Manual (44724) x     [] Other:  [x] TA (42859)x 1     [] Mech Traction (34216)  [] ES(attended) (41428)     [] ES (un) (22813): If BWC Please Indicate Time In/Out and Total Minutes  CPT Code Time in Time out Total Min                                           GOALS:  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 Progressing: []? Met: []? Not Met: []? Adjusted     Therapist goals for Patient:   Short Term Goals: To be achieved in: 2 weeks  1. Independent in HEP and progression per patient tolerance, in order to prevent re-injury. []? Progressing: [x]? Met: []? Not Met: []? Adjusted  2. Patient will have a decrease in pain to facilitate improvement in movement, function, and ADLs as indicated by Functional Deficits. []? Progressing: [x]? Met: []? Not Met: []? Adjusted     Long Term Goals: To be achieved in: 8 weeks  1. Disability index score of 25% or less for the LEFS to assist with reaching prior level of function. [x]? Progressing: []? Met: []? Not Met: []? Adjusted  2. Patient will demonstrate increased AROM to WNL to allow for proper joint functioning as indicated by patients Functional Deficits. []? Progressing: [x]? Met: []? Not Met: []? Adjusted  3.  Patient will demonstrate an increase in Strength to good proximal hip strength and control, within 5lb HHD in LE to allow for proper functional mobility as indicated by patients Functional Deficits. [x]? Progressing: []? Met: []? Not Met: []? Adjusted  4. Patient will return to 15+ minutes of ambulation without increased symptoms or restriction to return to PLOF. []? Progressing: [x]? Met: []? Not Met: []? Adjusted  5. Patient will tolerate 25+ minutes of sitting without increased symptoms or restriction to return to PLOF. []? Progressing: [x]? Met: []? Not Met: []? Adjusted       Progression Towards Functional goals:  [x] Patient is progressing as expected towards functional goals listed. [] Progression is slowed due to complexities listed. [] Progression has been slowed due to co-morbidities. [] Plan just implemented, too soon to assess goals progression  [] Other:     ASSESSMENT: Program today focused on discussion of HEP and workouts at gym. Pt still having some increased pain with descending stairs and performing leg extensions. Pt.continues to work on strengthening his quad today Patient tolerates all other interventions and progressions including SL leg press. Advised to monitor mechanics with program at gym while progressing strength, hold on SLS while monitoring descending stairs   Return to Play: (if applicable)   []  Stage 1: Intro to Strength   []  Stage 2: Return to Run and Strength   []  Stage 3: Return to Jump and Strength   []  Stage 4: Dynamic Strength and Agility   []  Stage 5: Sport Specific Training     []  Ready to Return to Play, Meets All Above Stages   []  Not Ready for Return to Sports   Comments:            Treatment/Activity Tolerance:  [x] Patient tolerated treatment well [x] Patient limited by fatique  [] Patient limited by pain  [] Patient limited by other medical complications  [] Other:     Overall Progression Towards Functional goals/ Treatment Progress Update:  [x] Patient is progressing as expected towards functional goals listed. [] Progression is slowed due to complexities/Impairments listed. [] Progression has been slowed due to co-morbidities. [] Plan just implemented, too soon to assess goals progression <30days   [] Goals require adjustment due to lack of progress  [] Patient is not progressing as expected and requires additional follow up with physician  [] Other    Prognosis for POC: [x] Good [] Fair  [] Poor    Patient requires continued skilled intervention: [x] Yes  [] No        PLAN: Decrease pain, increase knee ROM, improve quad contraction, and increase strength per protocol. .  Pt to continue with gym program 1-2x/week in addition to PT. Pt. Will wear a knee sleeve PRN. Needs to schedule BIODEX when patellar tendon is improved. see  Guerline Hutchinson 5/31 DN with NPV to tendon/ fat pad  [x] Continue per plan of care [] Alter current plan (see comments)  [] Plan of care initiated [] Hold pending MD visit [] Discharge    Electronically signed by:  Madeline Ness [address]  Porfirio Gillespie, PT     Note: If patient does not return for scheduled/recommended follow up visits, this note will serve as a discharge from care along with the most recent update on progress.

## 2022-05-31 ENCOUNTER — OFFICE VISIT (OUTPATIENT)
Dept: ORTHOPEDIC SURGERY | Age: 32
End: 2022-05-31
Payer: COMMERCIAL

## 2022-05-31 VITALS — BODY MASS INDEX: 34.62 KG/M2 | HEIGHT: 68 IN | WEIGHT: 228.4 LBS

## 2022-05-31 DIAGNOSIS — S83.512D RUPTURE OF ANTERIOR CRUCIATE LIGAMENT OF LEFT KNEE, SUBSEQUENT ENCOUNTER: Primary | ICD-10-CM

## 2022-05-31 PROCEDURE — G8417 CALC BMI ABV UP PARAM F/U: HCPCS | Performed by: ORTHOPAEDIC SURGERY

## 2022-05-31 PROCEDURE — G8427 DOCREV CUR MEDS BY ELIG CLIN: HCPCS | Performed by: ORTHOPAEDIC SURGERY

## 2022-05-31 PROCEDURE — 1036F TOBACCO NON-USER: CPT | Performed by: ORTHOPAEDIC SURGERY

## 2022-05-31 PROCEDURE — 99213 OFFICE O/P EST LOW 20 MIN: CPT | Performed by: ORTHOPAEDIC SURGERY

## 2022-05-31 NOTE — PROGRESS NOTES
Knee Arthroscopy Follow-up  Rizwan Pelletier is here for follow up after left knee arthroscopic surgery. Surgery date was 2/26/22. Findings at surgery: Left knee anterior cruciate ligament tear and lateral meniscus tear. The patient's pain is rated at 3/10. Doing PT at the [facility] office. He notes some mild anterior pain      Physical Examination:  Ht 5' 8\" (1.727 m)   Wt 228 lb 6.4 oz (103.6 kg)   BMI 34.73 kg/m²    Patient is awake, alert, and in no acute distress. No effusion  Left knee ROM 0-120  Negative Lachman test.  Mild quad atrophy. Mild inhibition. Assessment:   3-1/2 months status post left knee arthroscopy, anterior cruciate ligament reconstruction with quadriceps tendon allograft and partial lateral meniscectomy. Plan:   Continue/finish PT. Continue HEP and strengthening    Ice as needed. NSAIDs as needed. Follow-up in 3 months for evaluation of progress or prn if problems. Renettattcayla January. Chapis Echavarria MD  Orthopaedic Surgery and Sports Medicine       This note was generated with use of a verbal recognition program [facility]) and was checked for errors. It is possible that there are still dictated errors within this office note. If so, please bring any errors to my attention for an addendum. All efforts were made to ensure that this office note is accurate. Addendum: We will order him an ACL functional brace.     Electronically signed by Dominique Richmond MD on 6/9/2022 at 9:13 AM

## 2022-06-01 ENCOUNTER — HOSPITAL ENCOUNTER (OUTPATIENT)
Dept: PHYSICAL THERAPY | Age: 32
Setting detail: THERAPIES SERIES
Discharge: HOME | End: 2022-06-01
Payer: COMMERCIAL

## 2022-06-01 PROCEDURE — 97112 NEUROMUSCULAR REEDUCATION: CPT

## 2022-06-01 PROCEDURE — 97110 THERAPEUTIC EXERCISES: CPT

## 2022-06-01 NOTE — FLOWSHEET NOTE
[facility]    Physical Therapy Treatment Note/ Progress Report:     Date:  2022    Patient Name:  Catracho Briscoe    :  1990  MRN: [id number]  Medical/Treatment Diagnosis Information:  · Diagnosis: S83.512D (ICD-10-CM) - Rupture of anterior cruciate ligament of left knee, subsequent [id number] (ICD-10-CM) - Tear of lateral meniscus of left knee, current, unspecified tear type, initial encounter  · Treatment Diagnosis: s/p ACLR w/ quad tendon allograft and partial lateral meniscectomy  Insurance/Certification information:  PT Insurance Information: [facility] - $2400 deductible, 80%/20% co-insurance, 30 PT visits per calendar year  Physician Information:  Referring Practitioner: Dr. Joe Davenport of care signed (Y/N):     Date of Patient follow up with Physician:     Progress Report: []  Yes  [x]  No     Functional Scale: LEFS = 67% disability Date: 3/3/22     LEFS = 42% disability  22                                      LEFS=   41%Disability                      22    Date Range for reporting period:  Beginnin22  Endin22    Progress report due (10 Rx/or 30 days whichever is less):      Recertification due (POC duration/ or 90 days whichever is less): 22     Visit # Insurance Allowable Auth Needed   24 30 []Yes    []No     Pain level:  0/10     SUBJECTIVE: 13 weeks. Pt reports that he still has some swelling and discomfort in the knee. Pt saw MD yesterday, who feels this will improve with increased strength.       OBJECTIVE:   Observation:    Test measurements:     3/15: steristrips intact, no drainage or overt s/sx of infection   3/28/22: L knee ext AROM = lacking 5 at beginning of session, 0 with PROM/OP; L knee flex AAROM = 113   22: L knee flex AAROM = 124 (w/wall slides)   22: L knee ext AROM = lacking 1 at rest, 0 with QS; flex AAROM = 135 (w/wall slides)   LEFS = 46/80 = 42% disability   4/28/ 22 Less visible distal patellar swelling, fat pad swelling today compared to Tuesdays session. Improved ambulation with no antalgia, has B varus alignment   5/10 Pt has mild TTP below the patella/ infrapatellar fat pad (hoffas fat pad)    5/16 -   MMT = R knee flexion and extension = 5/5 . MMT L knee flexion and extension = 5-/5   LEFS = 47/80 = 41% disability. Mid patella circumferencal measurements R = 39.25 cm. L = 41.0 cm.      RESTRICTIONS/PRECAUTIONS: s/p L ACLR w/ quad tendon allograft and partial lateral meniscectomy (2/25/22), hx of L ACLR (10 years ago)    Exercises/Interventions:   Therapeutic Ex 25' Resistance Sets/sec Reps Notes   Elliptical3' fwd/ bwd 6'5/10Incline calf stretch  Hamstring stretch EOB  Standing quad stretch  30\" 3x ea    MH ABD/HIP flex  B 75# 3 10x 5/25   TKE  75# 2 10  5 sec    Prone TKE  2 10 5\" hold   SLR +  30\" 3x 4/ 21   Bridges SB 2 10 5/ 10              TR @ gym5/ 10 [employer]   Squats on airex  2 10 4/ 28   Leg press  ECC 0/90  #   70# 2 10x   5/25   HS curls Ecc  25# 2 10x   5/ 10   Knee extension 90/ 45  B  25# 2 10x  5/25 w/ discomfort   Wall sits  w/BS  45\" 3  5/ 10            Therapeutic Activities 5'       Pt educated with RT gym with plan for strengthening, joint protection/ ROM limitations with leg extensions etc.Forward step ups with R LE flexion 6\" 1 15    Objective measures  6'      Sports cord circuit           Side steps L leading   1 10x                  Manual Intervention 0'       Knee mobs/PROM Tib/Fem Mobs Patella Mobs         Infrapatellar knee/ hoffas fat pad           NMR re-education 30'       LSD  2\" 1 15    SLS w/  3 ball toss  airex 10x in row   5/ 10        Lateral stepping  Monster walks wine 1 2 laps 5/ 10   RDL's  2 10 Bosu squats  2 10    Retro and lateral slider lunges  1 15x 5/ 10    Bosu lunges fwd  1 15x 5/ 10   Ladder drills slow  5'   Lateral, sideways, forward, backward   Step up with tactile cueing from SPT for quad control  4' 2 10    3/4 squats with TRX bands   1 10     Quarter squats with TRX bands   1 10                          Therapeutic Exercise and NMR EXR  [x] (84079) Provided verbal/tactile cueing for activities related to strengthening, flexibility, endurance, ROM for improvements in LE, proximal hip, and core control with self care, mobility, lifting, ambulation.  [] (31102) Provided verbal/tactile cueing for activities related to improving balance, coordination, kinesthetic sense, posture, motor skill, proprioception  to assist with LE, proximal hip, and core control in self care, mobility, lifting, ambulation and eccentric single leg control.      NMR and Therapeutic Activities:    [x] (06749 or 72960) Provided verbal/tactile cueing for activities related to improving balance, coordination, kinesthetic sense, posture, motor skill, proprioception and motor activation to allow for proper function of core, proximal hip and LE with self care and ADLs  [] (56056) Gait Re-education- Provided training and instruction to the patient for proper LE, core and proximal hip recruitment and positioning and eccentric body weight control with ambulation re-education including up and down stairs     Home Exercise Program:    [x] (18015) Reviewed/Progressed HEP activities related to strengthening, flexibility, endurance, ROM of core, proximal hip and LE for functional self-care, mobility, lifting and ambulation/stair navigation   [] (73875)Reviewed/Progressed HEP activities related to improving balance, coordination, kinesthetic sense, posture, motor skill, proprioception of core, proximal hip and LE for self care, mobility, lifting, and ambulation/stair navigation      Manual Treatments:  PROM / STM / Oscillations-Mobs:  G-I, II, III, IV (PA's, Inf., Post.)  [x] (92699) Provided manual therapy to mobilize LE, proximal hip and/or LS spine soft tissue/joints for the purpose of modulating pain, promoting relaxation,  increasing ROM, reducing/eliminating soft tissue swelling/inflammation/restriction, improving soft tissue extensibility and allowing for proper ROM for normal function with self care, mobility, lifting and ambulation. Modalities: ice cup 8' to infrapatellar knee/ patella  Charges:  Timed Code Treatment Minutes: 60   Total Treatment Minutes: 60       [] EVAL (LOW) 50192 (typically 20 minutes face-to-face)  [] EVAL (MOD) 79119 (typically 30 minutes face-to-face)  [] EVAL (HIGH) 12660 (typically 45 minutes face-to-face)  [] RE-EVAL     [x] JENA(49400) x 2   [] IONTO (90466)  [x] NMR (32466) x 2    [] VASO (58368)   [] Manual (72317) x     [] Other:  [] TA (47359)x      [] Mech Traction (03890)  [] ES(attended) (78810)     [] ES (un) (56032): If BWC Please Indicate Time In/Out and Total Minutes  CPT Code Time in Time out Total Min                                           GOALS:  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 Progressing: []? Met: []? Not Met: []? Adjusted     Therapist goals for Patient:   Short Term Goals: To be achieved in: 2 weeks  1. Independent in HEP and progression per patient tolerance, in order to prevent re-injury. []? Progressing: [x]? Met: []? Not Met: []? Adjusted  2. Patient will have a decrease in pain to facilitate improvement in movement, function, and ADLs as indicated by Functional Deficits. []? Progressing: [x]? Met: []? Not Met: []? Adjusted     Long Term Goals: To be achieved in: 8 weeks  1. Disability index score of 25% or less for the LEFS to assist with reaching prior level of function. [x]? Progressing: []? Met: []? Not Met: []? Adjusted  2. Patient will demonstrate increased AROM to WNL to allow for proper joint functioning as indicated by patients Functional Deficits. []? Progressing: [x]? Met: []? Not Met: []? Adjusted  3.  Patient will demonstrate an increase in Strength to good proximal hip strength and control, within 5lb HHD in LE to allow for proper functional mobility as indicated by patients Functional Deficits. [x]? Progressing: []? Met: []? Not Met: []? Adjusted  4. Patient will return to 15+ minutes of ambulation without increased symptoms or restriction to return to PLOF. []? Progressing: [x]? Met: []? Not Met: []? Adjusted  5. Patient will tolerate 25+ minutes of sitting without increased symptoms or restriction to return to PLOF. []? Progressing: [x]? Met: []? Not Met: []? Adjusted       Progression Towards Functional goals:  [x] Patient is progressing as expected towards functional goals listed. [] Progression is slowed due to complexities listed. [] Progression has been slowed due to co-morbidities. [] Plan just implemented, too soon to assess goals progression  [] Other:     ASSESSMENT:  Progressed functional and eccentric strengthening today with fatigue noted, no increased knee pain. Decreased weight with knee extension 90/30 due to discomfort at 25#. Pt was very fatigue with progressions and today and requires further skilled PT services to address functional strength and endurance deficits in order to return to PLOF. Return to Play: (if applicable)   []  Stage 1: Intro to Strength   []  Stage 2: Return to Run and Strength   []  Stage 3: Return to Jump and Strength   []  Stage 4: Dynamic Strength and Agility   []  Stage 5: Sport Specific Training     []  Ready to Return to Play, Meets All Above Stages   []  Not Ready for Return to Sports   Comments:            Treatment/Activity Tolerance:  [x] Patient tolerated treatment well [x] Patient limited by fatique  [] Patient limited by pain  [] Patient limited by other medical complications  [] Other:     Overall Progression Towards Functional goals/ Treatment Progress Update:  [x] Patient is progressing as expected towards functional goals listed. [] Progression is slowed due to complexities/Impairments listed. [] Progression has been slowed due to co-morbidities.   [] Plan just implemented, too soon to assess goals progression <30days   [] Goals require adjustment due to lack of progress  [] Patient is not progressing as expected and requires additional follow up with physician  [] Other    Prognosis for POC: [x] Good [] Fair  [] Poor    Patient requires continued skilled intervention: [x] Yes  [] No        PLAN: Decrease pain, increase knee ROM, improve quad contraction, and increase strength per protocol. .  Pt to continue with gym program 1-2x/week in addition to PT. Pt. Will wear a knee sleeve PRN. Needs to schedule BIODEX when patellar tendon is improved. see  Maico Haider 5/31   [x] Continue per plan of care [] Alter current plan (see comments)  [] Plan of care initiated [] Hold pending MD visit [] Discharge    Electronically signed by:  Carol Rolle PT, DPT     Note: If patient does not return for scheduled/recommended follow up visits, this note will serve as a discharge from care along with the most recent update on progress.

## 2022-06-08 ENCOUNTER — HOSPITAL ENCOUNTER (OUTPATIENT)
Dept: PHYSICAL THERAPY | Age: 32
Setting detail: THERAPIES SERIES
Discharge: HOME | End: 2022-06-08
Payer: COMMERCIAL

## 2022-06-08 PROCEDURE — 97110 THERAPEUTIC EXERCISES: CPT | Performed by: SPECIALIST/TECHNOLOGIST

## 2022-06-08 PROCEDURE — 97112 NEUROMUSCULAR REEDUCATION: CPT | Performed by: SPECIALIST/TECHNOLOGIST

## 2022-06-08 NOTE — FLOWSHEET NOTE
[facility]    Physical Therapy Treatment Note/ Progress Report:     Date:  2022    Patient Name:  Katharina Alfredo    :  1990  MRN: [id number]  Medical/Treatment Diagnosis Information:  · Diagnosis: S83.512D (ICD-10-CM) - Rupture of anterior cruciate ligament of left knee, subsequent [id number] (ICD-10-CM) - Tear of lateral meniscus of left knee, current, unspecified tear type, initial encounter  · Treatment Diagnosis: s/p ACLR w/ quad tendon allograft and partial lateral meniscectomy  Insurance/Certification information:  PT Insurance Information: [facility] - $2400 deductible, 80%/20% co-insurance, 30 PT visits per calendar year  Physician Information:  Referring Practitioner: Dr. Olga Elizabeth of care signed (Y/N):     Date of Patient follow up with Physician:     Progress Report: []  Yes  [x]  No     Functional Scale: LEFS = 67% disability Date: 3/3/22     LEFS = 42% disability  22                                      LEFS=   41%Disability                      22    Date Range for reporting period:  Beginnin22  Endin22    Progress report due (10 Rx/or 30 days whichever is less):      Recertification due (POC duration/ or 90 days whichever is less): 22     Visit # Insurance Allowable Auth Needed   24 30 []Yes    []No     Pain level:  0/10     SUBJECTIVE: 14+ weeks. Pt reports having some anterior knee pain with popping associated with descending stairs/ and loading his left knee. Has some shooting pains noticed with ambulation at times. Attempting to get into the gym but is mainly doing elliptical and ice cupping which has helped. Has decreased compressive load to his knee.         OBJECTIVE:   Observation:    Test measurements:     3/15: steristrips intact, no drainage or overt s/sx of infection   3/28/22: L knee ext AROM = lacking 5 at beginning of session, 0 with PROM/OP; L knee flex AAROM = 113   4/7/22: L knee flex AAROM = 124 (w/wall slides)   4/13/22: L knee ext AROM = lacking 1 at rest, 0 with QS; flex AAROM = 135 (w/wall slides)   LEFS = 46/80 = 42% disability   4/28/ 22 Less visible distal patellar swelling, fat pad swelling today compared to Tuesdays session. Improved ambulation with no antalgia, has B varus alignment   5/10 Pt has mild TTP below the patella/ infrapatellar fat pad (hoffas fat pad)    5/16  MMT = R knee flexion and extension = 5/5 . MMT L knee flexion and extension = 5-/5     LEFS = 47/80 = 41% disability. Mid patella circumferencal measurements R = 39.25 cm. L = 41.0 cm.      RESTRICTIONS/PRECAUTIONS: s/p L ACLR w/ quad tendon allograft and partial lateral meniscectomy (2/25/22), hx of L ACLR (10 years ago)    Exercises/Interventions:   Therapeutic Ex 25' Resistance Sets/sec Reps Notes   Elliptical3' fwd/ bwd 6' 6/8Incline calf stretch  Hamstring stretch EOB  Standing quad stretch  30\" 3x ea    MH ABD/HIP flex  B 75# 3 10x 5/25          SLR +  30\" 3x 4/ 21   Bridges SB 2 10 5/ 10              TR @ gym5/ 10 [employer]   Squats on airex  2 10 4/ 28   Leg press  ECC 0/90  #   80# 2 10x  6/8   HS curls  SL  25# 2 10x 6/8   Knee extension 90/ 45  ECC  25# 3 10x  6/8   Wall sits  w/BS  45- 1min 3 HEP discussing             Therapeutic Activities 5'       Pt educated with RT gym with plan for strengthening, joint protection/ ROM limitations with leg extensions etc.Forward step ups with R LE flexion 6\" 1 15         Sports cord circuit           Side steps L leading   1 10x                  Manual Intervention 0'       Knee mobs/PROM Tib/Fem Mobs Patella Mobs         Infrapatellar knee/ hoffas fat pad           NMR re-education 30'       LSD  2\" 1 15    SLS w/  3 ball toss  airex 10x in row   5/ 10        Lateral stepping  Monster walks Wine + blue 1 2 laps  6/8   RDL's  2 10 Bosu squats  2 10    Retro and lateral slider lunges  1 15x 5/ 10    Bosu lunges fwd  1 15x 5/ 10 Ladder drills slow  5'   Lateral, sideways, forward, backward   Step up with tactile cueing from SPT for quad control w/ blue TB strap 6\" 2 10 6/8   3/4 squats with TRX bands   1 10      TRX with SL squat to chair + aerex for balance  1 15x 6/8                            Therapeutic Exercise and NMR EXR  [x] (90740) Provided verbal/tactile cueing for activities related to strengthening, flexibility, endurance, ROM for improvements in LE, proximal hip, and core control with self care, mobility, lifting, ambulation.  [] (92625) Provided verbal/tactile cueing for activities related to improving balance, coordination, kinesthetic sense, posture, motor skill, proprioception  to assist with LE, proximal hip, and core control in self care, mobility, lifting, ambulation and eccentric single leg control.      NMR and Therapeutic Activities:    [x] (77015 or 69145) Provided verbal/tactile cueing for activities related to improving balance, coordination, kinesthetic sense, posture, motor skill, proprioception and motor activation to allow for proper function of core, proximal hip and LE with self care and ADLs  [] (44869) Gait Re-education- Provided training and instruction to the patient for proper LE, core and proximal hip recruitment and positioning and eccentric body weight control with ambulation re-education including up and down stairs     Home Exercise Program:    [x] (89981) Reviewed/Progressed HEP activities related to strengthening, flexibility, endurance, ROM of core, proximal hip and LE for functional self-care, mobility, lifting and ambulation/stair navigation   [] (96659)Reviewed/Progressed HEP activities related to improving balance, coordination, kinesthetic sense, posture, motor skill, proprioception of core, proximal hip and LE for self care, mobility, lifting, and ambulation/stair navigation      Manual Treatments:  PROM / STM / Oscillations-Mobs:  G-I, II, III, IV (PA's, Inf., Post.)  [x] (09664) Provided manual therapy to mobilize LE, proximal hip and/or LS spine soft tissue/joints for the purpose of modulating pain, promoting relaxation,  increasing ROM, reducing/eliminating soft tissue swelling/inflammation/restriction, improving soft tissue extensibility and allowing for proper ROM for normal function with self care, mobility, lifting and ambulation. Modalities: ice cup 8' to infrapatellar knee/ patella  Charges:  Timed Code Treatment Minutes: 60   Total Treatment Minutes: 68       [] EVAL (LOW) 16033 (typically 20 minutes face-to-face)  [] EVAL (MOD) 31036 (typically 30 minutes face-to-face)  [] EVAL (HIGH) 83234 (typically 45 minutes face-to-face)  [] RE-EVAL     [x] XW(41339) x 2   [] IONTO (95653)  [x] NMR (07232) x 2    [] VASO (35047)   [] Manual (19673) x     [] Other:  [] TA (67229)x      [] Mech Traction (05147)  [] ES(attended) (75277)     [] ES (un) (64513): If BWC Please Indicate Time In/Out and Total Minutes  CPT Code Time in Time out Total Min                                           GOALS:  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 Progressing: []? Met: []? Not Met: []? Adjusted     Therapist goals for Patient:   Short Term Goals: To be achieved in: 2 weeks  1. Independent in HEP and progression per patient tolerance, in order to prevent re-injury. []? Progressing: [x]? Met: []? Not Met: []? Adjusted  2. Patient will have a decrease in pain to facilitate improvement in movement, function, and ADLs as indicated by Functional Deficits. []? Progressing: [x]? Met: []? Not Met: []? Adjusted     Long Term Goals: To be achieved in: 8 weeks  1. Disability index score of 25% or less for the LEFS to assist with reaching prior level of function. [x]? Progressing: []? Met: []? Not Met: []? Adjusted  2. Patient will demonstrate increased AROM to WNL to allow for proper joint functioning as indicated by patients Functional Deficits. []? Progressing: [x]? Met: []?  Not Met: []? Adjusted  3. Patient will demonstrate an increase in Strength to good proximal hip strength and control, within 5lb HHD in LE to allow for proper functional mobility as indicated by patients Functional Deficits. [x]? Progressing: []? Met: []? Not Met: []? Adjusted  4. Patient will return to 15+ minutes of ambulation without increased symptoms or restriction to return to PLOF. []? Progressing: [x]? Met: []? Not Met: []? Adjusted  5. Patient will tolerate 25+ minutes of sitting without increased symptoms or restriction to return to PLOF. []? Progressing: [x]? Met: []? Not Met: []? Adjusted       Progression Towards Functional goals:  [x] Patient is progressing as expected towards functional goals listed. [] Progression is slowed due to complexities listed. [] Progression has been slowed due to co-morbidities. [] Plan just implemented, too soon to assess goals progression  [] Other:     ASSESSMENT:  Progressed functional and eccentric strengthening today with fatigue noted with visible muscle tremors with step ups. no increased knee pain. Less patellar pain with quad eccentric emphasis/ strengthening program today. Mechanics altered to decrease tension to left patellar tendon with TRX squats and FSU with varus stress to knee. Pt was very fatigue with progressions and today and requires further skilled PT services to address functional strength and endurance deficits in order to return to PLOF.      Return to Play: (if applicable)   []  Stage 1: Intro to Strength   []  Stage 2: Return to Run and Strength   []  Stage 3: Return to Jump and Strength   []  Stage 4: Dynamic Strength and Agility   []  Stage 5: Sport Specific Training     []  Ready to Return to Play, Meets All Above Stages   []  Not Ready for Return to Sports   Comments:            Treatment/Activity Tolerance:  [x] Patient tolerated treatment well [x] Patient limited by fatique  [] Patient limited by pain  [] Patient limited by other medical complications  [] Other:     Overall Progression Towards Functional goals/ Treatment Progress Update:  [x] Patient is progressing as expected towards functional goals listed. [] Progression is slowed due to complexities/Impairments listed. [] Progression has been slowed due to co-morbidities. [] Plan just implemented, too soon to assess goals progression <30days   [] Goals require adjustment due to lack of progress  [] Patient is not progressing as expected and requires additional follow up with physician  [] Other    Prognosis for POC: [x] Good [] Fair  [] Poor    Patient requires continued skilled intervention: [x] Yes  [] No        PLAN: Decrease pain, increase knee ROM, improve quad contraction, and increase strength per protocol. .  Pt to continue with gym program 1-2x/week in addition to PT. Pt. Will wear a knee sleeve PRN. Needs to schedule BIODEX when patellar tendon is improved. see  Guerline Hutchinson 5/31   [x] Continue per plan of care [] Alter current plan (see comments)  [] Plan of care initiated [] Hold pending MD visit [] Discharge    Electronically signed by:  Patsy Pandya PTA, [id number]   Porfirio Gillespie, PT, DPT     Note: If patient does not return for scheduled/recommended follow up visits, this note will serve as a discharge from care along with the most recent update on progress.

## 2022-06-22 ENCOUNTER — HOSPITAL ENCOUNTER (OUTPATIENT)
Dept: PHYSICAL THERAPY | Age: 32
Setting detail: THERAPIES SERIES
Discharge: HOME | End: 2022-06-22
Payer: COMMERCIAL

## 2022-06-22 PROCEDURE — 97110 THERAPEUTIC EXERCISES: CPT

## 2022-06-22 PROCEDURE — 97112 NEUROMUSCULAR REEDUCATION: CPT

## 2022-06-22 NOTE — PROGRESS NOTES
[employer]     Physical Therapy Re-Certification Plan of Care    Dear Dr. Lindsay Garcia,    We had the pleasure of treating the following patient for physical therapy services at [address] [place] [address]. A summary of our findings can be found in the updated assessment below. This includes our plan of care. If you have any questions or concerns regarding these findings, please do not hesitate to contact me at the office phone number checked above. Thank you for the referral.     Physician Signature:________________________________Date:__________________  By signing above (or electronic signature), therapists plan is approved by physician    Date Range Of Visits: 3/3/22 - 22  Total Visits to Date: 25  Overall Response to Treatment:   [x]Patient is responding well to treatment and improvement is noted with regards  to goals   []Patient should continue to improve in reasonable time if they continue HEP   []Patient has plateaued and is no longer responding to skilled PT intervention    []Patient is getting worse and would benefit from return to referring MD   []Patient unable to adhere to initial POC   [x]Other: Pt's strength is progressing, but he continues to demonstrate eccentric quad strength deficits, especially with SL squats. Pt would benefit from continued skilled PT services to address these deficits. Recommend continued PT 1x/10-14 days x 8 weeks.      Physical Therapy Treatment Note/ Progress Report:     Date:  2022    Patient Name:  Itz Cohen    :  1990  MRN: [id number]  Medical/Treatment Diagnosis Information:  · Diagnosis: S83.512D (ICD-10-CM) - Rupture of anterior cruciate ligament of left knee, subsequent [id number] (ICD-10-CM) - Tear of lateral meniscus of left knee, current, unspecified tear type, initial encounter  · Treatment Diagnosis: s/p ACLR w/ quad tendon allograft and partial lateral Strength (measured in lbs using hand held dynamometer) LEFT RIGHT   HIP Flexors  58.4 55.2    HIP Abductors  36.9 42.0    Knee EXT (quad) 49.6 59.8   Knee Flex (HS) 60.1  54.8         RESTRICTIONS/PRECAUTIONS: s/p L ACLR w/ quad tendon allograft and partial lateral meniscectomy (2/25/22), hx of L ACLR (10 years ago)    Exercises/Interventions:   Therapeutic Ex 25' Resistance Sets/sec Reps Notes   Elliptical3' fwd/ bwd 6' 6/8Incline calf stretch  Hamstring stretch EOB  Standing quad stretch  30\" 3x ea    MH ABD/HIP flex  B 75# 3 10x 5/25          SLR +  30\" 3x 4/ 21   Bridges SB 2 10 5/ 10              TR @ gym5/ 10 [employer]   Squats on airex  2 10 4/ 28   Leg press  ECC 0/90  #   90# 2  2 10x  15  6/8   HS curls  SL  30/35# 2 15x 6/8  1x15 30#, 1x15 35#   Knee extension 90/ 45  ECC  25# 2 15  6/8   Wall sits  w/BS  45- 1min 3 HEP discussing             Therapeutic Activities 5'       Pt educated with RT gym with plan for strengthening, joint protection/ ROM limitations with leg extensions etc.Forward step ups with R LE flexion 6\" 1 15         Sports cord circuit           Side steps L leading   1 10x                  Manual Intervention 0'       Knee mobs/PROM Tib/Fem Mobs Patella Mobs         Infrapatellar knee/ hoffas fat pad           NMR re-education 30'       LSD  2\" 1 15    SLS w/  3 ball toss  airex 10x in row   5/ 10        Lateral stepping  Monster walks Wine + blue 1 2 laps  6/8   RDL's  2 10 Bosu squats  2 10    Retro and lateral slider lunges 7.5# KB 1 15x 5/ 10    Bosu lunges fwd  1 15x 5/ 10   Ladder drills slow  5'   Lateral, sideways, forward, backward   Step up with tactile cueing from SPT for quad control w/ blue TB strap 6\" 2 10 6/8   3/4 squats with TRX bands   1 10      TRX with SL squat to chair + aerex for balance  1 15x 6/8                            Therapeutic Exercise and NMR EXR  [x] (53643) Provided verbal/tactile cueing for activities related to strengthening, flexibility, endurance, ROM for improvements in LE, proximal hip, and core control with self care, mobility, lifting, ambulation.  [] (62499) Provided verbal/tactile cueing for activities related to improving balance, coordination, kinesthetic sense, posture, motor skill, proprioception  to assist with LE, proximal hip, and core control in self care, mobility, lifting, ambulation and eccentric single leg control. NMR and Therapeutic Activities:    [x] (11682 or 63012) Provided verbal/tactile cueing for activities related to improving balance, coordination, kinesthetic sense, posture, motor skill, proprioception and motor activation to allow for proper function of core, proximal hip and LE with self care and ADLs  [] (74201) Gait Re-education- Provided training and instruction to the patient for proper LE, core and proximal hip recruitment and positioning and eccentric body weight control with ambulation re-education including up and down stairs     Home Exercise Program:    [x] (27964) Reviewed/Progressed HEP activities related to strengthening, flexibility, endurance, ROM of core, proximal hip and LE for functional self-care, mobility, lifting and ambulation/stair navigation   [] (33788)Reviewed/Progressed HEP activities related to improving balance, coordination, kinesthetic sense, posture, motor skill, proprioception of core, proximal hip and LE for self care, mobility, lifting, and ambulation/stair navigation      Manual Treatments:  PROM / STM / Oscillations-Mobs:  G-I, II, III, IV (PA's, Inf., Post.)  [x] (00546) Provided manual therapy to mobilize LE, proximal hip and/or LS spine soft tissue/joints for the purpose of modulating pain, promoting relaxation,  increasing ROM, reducing/eliminating soft tissue swelling/inflammation/restriction, improving soft tissue extensibility and allowing for proper ROM for normal function with self care, mobility, lifting and ambulation.      Modalities:   Charges:  Timed Code Treatment Minutes: 60   Total Treatment Minutes: 60       [] EVAL (LOW) 23034 (typically 20 minutes face-to-face)  [] EVAL (MOD) 73007 (typically 30 minutes face-to-face)  [] EVAL (HIGH) 59484 (typically 45 minutes face-to-face)  [] RE-EVAL     [x] DL(16962) x 2   [] IONTO (67659)  [x] NMR (30014) x 2    [] VASO (62995)   [] Manual (50574) x     [] Other:  [] TA (62547)x      [] Mech Traction (91596)  [] ES(attended) (44578)     [] ES (un) (89505): If BWC Please Indicate Time In/Out and Total Minutes  CPT Code Time in Time out Total Min                                           GOALS:  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 Progressing: []? Met: []? Not Met: []? Adjusted     Therapist goals for Patient:   Short Term Goals: To be achieved in: 2 weeks  1. Independent in HEP and progression per patient tolerance, in order to prevent re-injury. []? Progressing: [x]? Met: []? Not Met: []? Adjusted  2. Patient will have a decrease in pain to facilitate improvement in movement, function, and ADLs as indicated by Functional Deficits. []? Progressing: [x]? Met: []? Not Met: []? Adjusted     Long Term Goals: To be achieved in: 8 weeks  1. Disability index score of 25% or less for the LEFS to assist with reaching prior level of function. [x]? Progressing: []? Met: []? Not Met: []? Adjusted  2. Patient will demonstrate increased AROM to WNL to allow for proper joint functioning as indicated by patients Functional Deficits. []? Progressing: [x]? Met: []? Not Met: []? Adjusted  3. Patient will demonstrate an increase in Strength to good proximal hip strength and control, within 5lb HHD in LE to allow for proper functional mobility as indicated by patients Functional Deficits. [x]? Progressing: []? Met: []? Not Met: []? Adjusted  4. Patient will return to 15+ minutes of ambulation without increased symptoms or restriction to return to PLOF. []? Progressing: [x]? Met: []? Not Met: []? Adjusted  5.  Patient will tolerate 25+ minutes of sitting without increased symptoms or restriction to return to PLOF. []? Progressing: [x]? Met: []? Not Met: []? Adjusted       Progression Towards Functional goals:  [x] Patient is progressing as expected towards functional goals listed. [] Progression is slowed due to complexities listed. [] Progression has been slowed due to co-morbidities. [] Plan just implemented, too soon to assess goals progression  [] Other:     ASSESSMENT:  Progressed functional and eccentric strengthening today with fatigue noted with visible muscle tremors with step ups. no increased knee pain. Less patellar pain with quad eccentric emphasis/ strengthening program today. Pt was very fatigue with progressions and today and requires further skilled PT services to address functional strength and endurance deficits in order to return to PLOF. Discussed with pt about adding LSDs to HEP to increase focus on eccentric quad strengthening. Return to Play: (if applicable)   []  Stage 1: Intro to Strength   []  Stage 2: Return to Run and Strength   []  Stage 3: Return to Jump and Strength   []  Stage 4: Dynamic Strength and Agility   []  Stage 5: Sport Specific Training     []  Ready to Return to Play, Meets All Above Stages   []  Not Ready for Return to Sports   Comments:            Treatment/Activity Tolerance:  [x] Patient tolerated treatment well [x] Patient limited by fatique  [] Patient limited by pain  [] Patient limited by other medical complications  [] Other:     Overall Progression Towards Functional goals/ Treatment Progress Update:  [x] Patient is progressing as expected towards functional goals listed. [] Progression is slowed due to complexities/Impairments listed. [] Progression has been slowed due to co-morbidities.   [] Plan just implemented, too soon to assess goals progression <30days   [] Goals require adjustment due to lack of progress  [] Patient is not progressing as expected and requires additional follow up with physician  [] Other    Prognosis for POC: [x] Good [] Fair  [] Poor    Patient requires continued skilled intervention: [x] Yes  [] No        PLAN: Decrease pain, increase knee ROM, improve quad contraction, and increase strength per protocol. .  Pt to continue with gym program 1-2x/week in addition to PT. Pt. Will wear a knee sleeve PRN. Needs to schedule BIODEX when patellar tendon is improved  [x] Continue per plan of care [] Alter current plan (see comments)  [] Plan of care initiated [] Hold pending MD visit [] Discharge    Electronically signed by:   Carlos Friend PT, DPT     Note: If patient does not return for scheduled/recommended follow up visits, this note will serve as a discharge from care along with the most recent update on progress.

## 2022-07-01 DIAGNOSIS — S83.512D RUPTURE OF ANTERIOR CRUCIATE LIGAMENT OF LEFT KNEE, SUBSEQUENT ENCOUNTER: ICD-10-CM

## 2022-07-06 ENCOUNTER — HOSPITAL ENCOUNTER (OUTPATIENT)
Dept: PHYSICAL THERAPY | Age: 32
Setting detail: THERAPIES SERIES
Discharge: HOME | End: 2022-07-06
Payer: COMMERCIAL

## 2022-07-06 PROCEDURE — 97112 NEUROMUSCULAR REEDUCATION: CPT | Performed by: SPECIALIST/TECHNOLOGIST

## 2022-07-06 PROCEDURE — 97110 THERAPEUTIC EXERCISES: CPT | Performed by: SPECIALIST/TECHNOLOGIST

## 2022-07-06 NOTE — FLOWSHEET NOTE
[employer]    Physical Therapy Treatment Note/ Progress Report:     Date:  2022    Patient Name:  Francisco Abdullahi    :  1990  MRN: [id number]  Medical/Treatment Diagnosis Information:  · Diagnosis: S83.512D (ICD-10-CM) - Rupture of anterior cruciate ligament of left knee, subsequent [id number] (ICD-10-CM) - Tear of lateral meniscus of left knee, current, unspecified tear type, initial encounter  · Treatment Diagnosis: s/p ACLR w/ quad tendon allograft and partial lateral meniscectomy  Insurance/Certification information:  PT Insurance Information: [facility] - $2400 deductible, 80%/20% co-insurance, 30 PT visits per calendar year  Physician Information:  Referring Practitioner: Dr. Lisa Garcia of care signed (Y/N):     Date of Patient follow up with Physician:     Progress Report: [x]  Yes  []  No     Functional Scale: LEFS = 67% disability Date: 3/3/22     LEFS = 42% disability  22                                      LEFS=   41%Disability                      22     LEFS = 35%    22    Date Range for reporting period:  Beginnin22  Endin22    Progress report due (10 Rx/or 30 days whichever is less):      Recertification due (POC duration/ or 90 days whichever is less): 22     Visit # Insurance Allowable Auth Needed   25 30 []Yes    []No     Pain level:  0/10     SUBJECTIVE:  Pt has received his new knee brace but forget it today. Still having pain going down stairs Janneth Denney  Going to gym every other day except for being OOT for holiday.  Pt has access to [employer] etc.     OBJECTIVE:   Observation:    Test measurements:     3/15: steristrips intact, no drainage or overt s/sx of infection   3/28/22: L knee ext AROM = lacking 5 at beginning of session, 0 with PROM/OP; L knee flex AAROM = 113   22: L knee flex AAROM = 124 (w/wall slides)   22: L knee ext AROM = lacking 1 at rest, 0 with QS; flex AAROM = 135 (w/wall slides)   LEFS = 46/80 = 42% disability   4/28/ 22 Less visible distal patellar swelling, fat pad swelling today compared to Tuesdays session. Improved ambulation with no antalgia, has B varus alignment   5/10 Pt has mild TTP below the patella/ infrapatellar fat pad (hoffas fat pad)    5/16  MMT = R knee flexion and extension = 5/5 . MMT L knee flexion and extension = 5-/5     LEFS = 47/80 = 41% disability. Mid patella circumferencal measurements R = 39.25 cm.  L = 41.0 cm.   6/22/22:  ROM LEFT RIGHT   Knee ext 0 0   Knee Flex 140 141   Strength (measured in lbs using hand held dynamometer) LEFT RIGHT   HIP Flexors  58.4 55.2    HIP Abductors  36.9 42.0    Knee EXT (quad) 49.6 59.8   Knee Flex (HS) 60.1  54.8         RESTRICTIONS/PRECAUTIONS: s/p L ACLR w/ quad tendon allograft and partial lateral meniscectomy (2/25/22), hx of L ACLR (10 years ago)    Exercises/Interventions:  61'  Therapeutic Ex 25' Resistance Sets/sec Reps Notes   Elliptical3' fwd/ bwd 6' 6/8Incline calf stretch  Hamstring stretch EOB  Standing quad stretch  30\" 3x ea    MH ABD/HIP flex  B 75# 3 10x  7/6          SLR +  30\" 3x 4/ 21   Bridges SL  W/ triple threat SB  15x  7/6              TR @ gym5/ 10 [employer]   Squats on airex  2 10 4/ 28   Leg press   0/90  #   110# 2  2 10x  15    7/6   HS curls  SL  30/35# 2 15x  7/6     Knee extension 90/ 45  ECC   setting 1 to start  25# 2 15  7/6   Wall sits  w/BS   1min 3/4x  almost daily             Therapeutic Activities 5'       Forward step ups with R LE flexion 6\" 1 15         Sports cord circuit  Fwd(2 cones) and side stepping w/ 1 cone  SLS w/ lateral stepping at end  10x ea  7/6                           Manual Intervention 0'       Knee mobs/PROM Tib/Fem Mobs Patella Mobs         Infrapatellar knee/ hoffas fat pad           NMR re-education 30'       LSD  2\" 1 15    SLS w/  3 ball toss  airex 10x in row   5/ 10        Lateral and LE for self care, mobility, lifting, and ambulation/stair navigation      Manual Treatments:  PROM / STM / Oscillations-Mobs:  G-I, II, III, IV (PA's, Inf., Post.)  [x] (41854) Provided manual therapy to mobilize LE, proximal hip and/or LS spine soft tissue/joints for the purpose of modulating pain, promoting relaxation,  increasing ROM, reducing/eliminating soft tissue swelling/inflammation/restriction, improving soft tissue extensibility and allowing for proper ROM for normal function with self care, mobility, lifting and ambulation. Modalities:   Charges:  Timed Code Treatment Minutes: 60   Total Treatment Minutes: 60       [] EVAL (LOW) 43412 (typically 20 minutes face-to-face)  [] EVAL (MOD) 10557 (typically 30 minutes face-to-face)  [] EVAL (HIGH) 01039 (typically 45 minutes face-to-face)  [] RE-EVAL     [x] KW(85939) x 2   [] IONTO (41470)  [x] NMR (14833) x 2    [] VASO (54955)   [] Manual (44811) x     [] Other:  [] TA (96632)x      [] Mech Traction (62351)  [] ES(attended) (84149)     [] ES (un) (48879): If BWC Please Indicate Time In/Out and Total Minutes  CPT Code Time in Time out Total Min                                           GOALS:  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 Progressing: []? Met: []? Not Met: []? Adjusted     Therapist goals for Patient:   Short Term Goals: To be achieved in: 2 weeks  1. Independent in HEP and progression per patient tolerance, in order to prevent re-injury. []? Progressing: [x]? Met: []? Not Met: []? Adjusted  2. Patient will have a decrease in pain to facilitate improvement in movement, function, and ADLs as indicated by Functional Deficits. []? Progressing: [x]? Met: []? Not Met: []? Adjusted     Long Term Goals: To be achieved in: 8 weeks  1. Disability index score of 25% or less for the LEFS to assist with reaching prior level of function. [x]? Progressing: []? Met: []? Not Met: []? Adjusted  2.  Patient will demonstrate increased AROM to WNL to allow for proper joint functioning as indicated by patients Functional Deficits. []? Progressing: [x]? Met: []? Not Met: []? Adjusted  3. Patient will demonstrate an increase in Strength to good proximal hip strength and control, within 5lb HHD in LE to allow for proper functional mobility as indicated by patients Functional Deficits. [x]? Progressing: []? Met: []? Not Met: []? Adjusted  4. Patient will return to 15+ minutes of ambulation without increased symptoms or restriction to return to PLOF. []? Progressing: [x]? Met: []? Not Met: []? Adjusted  5. Patient will tolerate 25+ minutes of sitting without increased symptoms or restriction to return to PLOF. []? Progressing: [x]? Met: []? Not Met: []? Adjusted       Progression Towards Functional goals:  [x] Patient is progressing as expected towards functional goals listed. [] Progression is slowed due to complexities listed. [] Progression has been slowed due to co-morbidities. [] Plan just implemented, too soon to assess goals progression  [] Other:     ASSESSMENT:  Progressed functional and eccentric strengthening today with fatigue noted with visible muscle tremors with step ups. Challenged today with [employer] walkouts with  Forward/ rev and side stepping w/ SLS and having muscle control no increased knee pain. NO patellar pain with quad eccentric emphasis/ leg extension strengthening program today. Pt was very fatigued with progressions and today and requires further skilled PT services to address functional strength and endurance deficits in order to return to PLOF. Discussed with about progression of LSDs to HEP to increase focus on eccentric quad strengthening.      Return to Play: (if applicable)   []  Stage 1: Intro to Strength   []  Stage 2: Return to Run and Strength   []  Stage 3: Return to Jump and Strength   []  Stage 4: Dynamic Strength and Agility   []  Stage 5: Sport Specific Training     []  Ready to Return to Play, Meets All Above Stages   []  Not Ready for Return to Sports   Comments:            Treatment/Activity Tolerance:  [x] Patient tolerated treatment well [x] Patient limited by fatique  [] Patient limited by pain  [] Patient limited by other medical complications  [] Other:     Overall Progression Towards Functional goals/ Treatment Progress Update:  [x] Patient is progressing as expected towards functional goals listed. [] Progression is slowed due to complexities/Impairments listed. [] Progression has been slowed due to co-morbidities. [] Plan just implemented, too soon to assess goals progression <30days   [] Goals require adjustment due to lack of progress  [] Patient is not progressing as expected and requires additional follow up with physician  [] Other    Prognosis for POC: [x] Good [] Fair  [] Poor    Patient requires continued skilled intervention: [x] Yes  [] No        PLAN: Decrease pain, increase knee ROM, improve quad contraction, and increase strength per protocol. Pt to continue with gym program, transition for [facility] w/ insurance to be used for biodex testing 7/19 BIODEX if only. Boni Segura 8/30  [x] Continue per plan of care [] Alter current plan (see comments)  [] Plan of care initiated [] Hold pending MD visit [] Discharge    Electronically signed by: Wenceslao Torres, PTA/ [id number], PT, DPT      Note: If patient does not return for scheduled/recommended follow up visits, this note will serve as a discharge from care along with the most recent update on progress.

## 2022-07-19 ENCOUNTER — HOSPITAL ENCOUNTER (OUTPATIENT)
Dept: PHYSICAL THERAPY | Age: 32
Setting detail: THERAPIES SERIES
Discharge: HOME | End: 2022-07-19
Payer: COMMERCIAL

## 2022-07-19 PROCEDURE — 97110 THERAPEUTIC EXERCISES: CPT | Performed by: SPECIALIST/TECHNOLOGIST

## 2022-07-19 PROCEDURE — 97750 PHYSICAL PERFORMANCE TEST: CPT | Performed by: SPECIALIST/TECHNOLOGIST

## 2022-07-19 NOTE — FLOWSHEET NOTE
Samantha Olivo    Phone: [phone]   Fax: [phone]    Isokinetic Strength Testing Results Summary  Kneeatient Name:  Francisco Abdullahi    :  1990  MRN: [id number]  Restrictions/Precautions:   Medical/Treatment Diagnosis Information:    S/P ACL /LME        Insurance/Certification information:     Physician Information:     Pain level: 0/10    Latex Allergy:  [x]NO      []YES  Preferred Language for Healthcare:   [x]English       []other:    Visit # Insurance Allowable Auth required? Date Range    [x]  Yes  []  No      Pain level:  0/10     SUBJECTIVE:  Pt having some pain down stairs, some days are better than others. Janneth Denney    Going to gym every other day with no issues with workouts and muscles are adequately sore. Pt is doing leg extensions but keeping his wt lower than pain moments and  has protected ROM also. Is mainly working out at [employer] etc. Not GE as much. OBJECTIVE:  Observation:   Test measurements:    3/15: steristrips intact, no drainage or overt s/sx of infection  3/28/22: L knee ext AROM = lacking 5 at beginning of session, 0 with PROM/OP; L knee flex AAROM = 113  22: L knee flex AAROM = 124 (w/wall slides)  22: L knee ext AROM = lacking 1 at rest, 0 with QS; flex AAROM = 135 (w/wall slides)  LEFS = 46/80 = 42% disability   Less visible distal patellar swelling, fat pad swelling today compared to  session. Improved ambulation with no antalgia, has B varus alignment  5/10 Pt has mild TTP below the patella/ infrapatellar fat pad (hoffas fat pad)     MMT = R knee flexion and extension = 5/5 . MMT L knee flexion and extension = 5-/5     LEFS = 47/80 = 41% disability. Mid patella circumferencal measurements R = 39.25 cm.  L = 41.0 cm.   22:  ROM LEFT RIGHT   Knee ext 0 0   Knee Flex 140 141   Strength (measured in lbs using hand held dynamometer) LEFT RIGHT   HIP Flexors  58.4 55.2    HIP Abductors  36.9 42.0    Knee EXT (quad) 49.6 59.8   Knee Flex (HS) 60.1  54.8     7/19/22 LEFS  53/80    RESTRICTIONS/PRECAUTIONS: s/p L ACLR w/ quad tendon allograft and partial lateral meniscectomy (2/25/22), hx of L ACLR (10 years ago)    Exercises/Interventions:  48'  BIKE 5' 7/19   Incline calf stretch  Hamstring stretch EOB  Standing quad stretch  30\" 3x ea 7/19 warmup for BIODEX     Therapeutic Activities 10' t educated with RT gym with plan for strengthening, joint protection/ ROM limitations with leg extensions etc. 7/19 BIODEX results   7/19 see abov         On 7/19/2022 the patient, Yumiko Spain, underwent an Isokinetic Strength Test to evaluate current status and progress of the strengthening phase of his/her current rehabilitation program.  He is currently being treated for  ACL reconstruction/LME from 2/25/22. Attached you will find a computer generated summary of the results which outlines the current status. To summarize these results you will find that Yumiko Spain underwent a test measuring the strength of the knee Quadriceps and Hamstrings muscle groups at isokinetic speeds of 180 and 300 degrees/second. Standard protocol of testing is to provide pre-test stretching and warm up of both extremities followed by instruction in the test procedure and \"practice\" repetitions prior to each actual test session. The uninjured extremity undergoes the test procedure first followed by the injured extremity. The two speeds of resistance represent the power and endurance functions of the muscle groups tested.       Objective:  Isokinetic Test Results:  7/19/22  Bilateral Difference:  Quadricep 180 deg/sec: 55.6% [x] Deficit   [] Surplus 300 deg/sec: 37.1% [x] Deficit   [] Surplus   Hamstring 180 deg/sec: 22.5% [x] Deficit   [] Surplus 300 deg/sec: 17.3% [x] Deficit   [] Surplus     Normative Data, 180 degrees/second:  Quadricep Normal:  60% Patient: 27.1%   Hamstring Normal:   45% Patient: 33.7%     Normative Data, 300 degrees/second:  Quadricep Normal: 50% Patient: 23.8%   Hamstring Normal: 40% Patient: 25.3%       FUNCTIONAL TESTING      Y balance Test Left Right Deficit   Fwd      Retro Lateral      Retro cross-behind                  Single Leg Squat testing 1 min. Left Right  Deficit               Single Leg Hop for Distance 6yrds  Left  Right Deficit                     Triple Hop for distance  Left Right Deficit                  EXERCISES                                               Goals:      Charges:  Timed Code Treatment Minutes: 50   Total Treatment Minutes: 50     [] EVAL  [x] FF(60000) x1   [] IONTO  [] NMR (10860) x     [] VASO  [] Manual (56757) x       [x] Other: Physical Performance Test (33169) x  2   [] TA x  1    [] Mech Traction (03194)  [] ES(attended) (10746)      [] ES (un) (05683): The findings of this test would result with the following summary and recommendations:  Pt tolerated isokinetic testing well. Deficits throughout compared to uninvolved and expected ranges. Return to Play:    [x]  Stage 1: Intro to Strength   [x]  Stage 2: progress strength but no active jogging yet   []  Stage 3: Return to Jump and Strength   []  Stage 4: Dynamic Strength and Agility   []  Stage 5: Sport Specific Training     []  Ready to Return to Play, Meets All Above Stages   [x]  Not Ready for Return to Sports   Comments:   must decreased PTBWT ratios fpr Quadriceps/ hamstrings as able prior to higher level functional activities.           Sincerely,  [employer], PTA , [id number]      7/19/2022                   Therapeutic Exercise and NMR EXR  [x] (39699) Provided verbal/tactile cueing for activities related to strengthening, flexibility, endurance, ROM for improvements in LE, proximal hip, and core control with self care, mobility, lifting, ambulation.  [] (73876) Provided verbal/tactile cueing for activities related to improving balance, coordination, kinesthetic sense, posture, motor skill, proprioception  to assist with LE, proximal hip, and core control in self care, mobility, lifting, ambulation and eccentric single leg control. NMR and Therapeutic Activities:    [] (02576 or 03098) Provided verbal/tactile cueing for activities related to improving balance, coordination, kinesthetic sense, posture, motor skill, proprioception and motor activation to allow for proper function of core, proximal hip and LE with self care and ADLs  [] (54720) Gait Re-education- Provided training and instruction to the patient for proper LE, core and proximal hip recruitment and positioning and eccentric body weight control with ambulation re-education including up and down stairs     Home Exercise Program:  see above for suggestions for GYM program 7/19  [x] (99137) Reviewed/Progressed HEP activities related to strengthening, flexibility, endurance, ROM of core, proximal hip and LE for functional self-care, mobility, lifting and ambulation/stair navigation   [] (09228)Reviewed/Progressed HEP activities related to improving balance, coordination, kinesthetic sense, posture, motor skill, proprioception of core, proximal hip and LE for self care, mobility, lifting, and ambulation/stair navigation      Manual Treatments:  PROM / STM / Oscillations-Mobs:  G-I, II, III, IV (PA's, Inf., Post.)  [x] (05839) Provided manual therapy to mobilize LE, proximal hip and/or LS spine soft tissue/joints for the purpose of modulating pain, promoting relaxation,  increasing ROM, reducing/eliminating soft tissue swelling/inflammation/restriction, improving soft tissue extensibility and allowing for proper ROM for normal function with self care, mobility, lifting and ambulation. 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  Independent in HEP and progression per patient tolerance, in order to prevent re-injury. [] Progressing: [x] Met: [] Not Met: [] Adjusted  2. Patient will have a decrease in pain to facilitate improvement in movement, function, and ADLs as indicated by Functional Deficits. [] Progressing: [x] Met: [] Not Met: [] Adjusted     Long Term Goals: To be achieved in: 8 weeks  1. Disability index score of 25% or less for the LEFS to assist with reaching prior level of function. [x] Progressing: [] Met: [] Not Met: [] Adjusted  2. Patient will demonstrate increased AROM to WNL to allow for proper joint functioning as indicated by patients Functional Deficits. [] Progressing: [x] Met: [] Not Met: [] Adjusted  3. Patient will demonstrate an increase in Strength to good proximal hip strength and control, within 5lb HHD in LE to allow for proper functional mobility as indicated by patients Functional Deficits. [x] Progressing: [] Met: [] Not Met: [] Adjusted  4. Patient will return to 15+ minutes of ambulation without increased symptoms or restriction to return to PLOF. [] Progressing: [x] Met: [] Not Met: [] Adjusted  5. Patient will tolerate 25+ minutes of sitting without increased symptoms or restriction to return to PLOF. [] Progressing: [x] Met: [] Not Met: [] Adjusted       Progression Towards Functional goals:  [x] Patient is progressing as expected towards functional goals listed. [] Progression is slowed due to complexities listed. [] Progression has been slowed due to co-morbidities.   [] Plan just implemented, too soon to assess goals progression  [] Other:       Treatment/Activity Tolerance:  [x] Patient tolerated treatment well [] Patient limited by fatique  [] Patient limited by pain  [] Patient limited by other medical complications  [x] Other: BIODEX performed, denied any pain in involved left knee    Overall Progression Towards Functional goals/ Treatment Progress Update:  [x] Patient is progressing as expected towards functional goals listed. [] Progression is slowed due to complexities/Impairments listed. [] Progression has been slowed due to co-morbidities. [] Plan just implemented, too soon to assess goals progression <30days   [] Goals require adjustment due to lack of progress  [] Patient is not progressing as expected and requires additional follow up with physician  [] Other    Prognosis for POC: [x] Good [] Fair  [] Poor    Patient requires continued skilled intervention: [x] Yes  [] No        PLAN:  Return to therapy/ [facility] in 2-3 weeks for recheck but advised to push strength harder with quads while monitoring for Left knee pain. Leda Shape 8/30  [x] Continue per plan of care [] Alter current plan (see comments)  [] Plan of care initiated [] Hold pending MD visit [] Discharge    Electronically signed by: Verdia Lesch, PTA/ [id number], PT, DPT      Note: If patient does not return for scheduled/recommended follow up visits, this note will serve as a discharge from care along with the most recent update on progress.

## 2022-07-25 ENCOUNTER — OFFICE VISIT (OUTPATIENT)
Dept: INTERNAL MEDICINE CLINIC | Age: 32
End: 2022-07-25
Payer: COMMERCIAL

## 2022-07-25 VITALS
HEART RATE: 85 BPM | DIASTOLIC BLOOD PRESSURE: 72 MMHG | BODY MASS INDEX: 34.97 KG/M2 | OXYGEN SATURATION: 96 % | WEIGHT: 230 LBS | SYSTOLIC BLOOD PRESSURE: 136 MMHG

## 2022-07-25 DIAGNOSIS — E78.2 MIXED HYPERLIPIDEMIA: ICD-10-CM

## 2022-07-25 DIAGNOSIS — Z00.00 ANNUAL PHYSICAL EXAM: Primary | ICD-10-CM

## 2022-07-25 DIAGNOSIS — E66.09 CLASS 1 OBESITY DUE TO EXCESS CALORIES WITHOUT SERIOUS COMORBIDITY WITH BODY MASS INDEX (BMI) OF 34.0 TO 34.9 IN ADULT: ICD-10-CM

## 2022-07-25 PROCEDURE — 99395 PREV VISIT EST AGE 18-39: CPT | Performed by: INTERNAL MEDICINE

## 2022-07-25 SDOH — ECONOMIC STABILITY: FOOD INSECURITY: WITHIN THE PAST 12 MONTHS, YOU WORRIED THAT YOUR FOOD WOULD RUN OUT BEFORE YOU GOT MONEY TO BUY MORE.: NEVER TRUE

## 2022-07-25 SDOH — ECONOMIC STABILITY: FOOD INSECURITY: WITHIN THE PAST 12 MONTHS, THE FOOD YOU BOUGHT JUST DIDN'T LAST AND YOU DIDN'T HAVE MONEY TO GET MORE.: NEVER TRUE

## 2022-07-25 ASSESSMENT — PATIENT HEALTH QUESTIONNAIRE - PHQ9
SUM OF ALL RESPONSES TO PHQ QUESTIONS 1-9: 0
1. LITTLE INTEREST OR PLEASURE IN DOING THINGS: 0
2. FEELING DOWN, DEPRESSED OR HOPELESS: 0
SUM OF ALL RESPONSES TO PHQ9 QUESTIONS 1 & 2: 0

## 2022-07-25 ASSESSMENT — SOCIAL DETERMINANTS OF HEALTH (SDOH): HOW HARD IS IT FOR YOU TO PAY FOR THE VERY BASICS LIKE FOOD, HOUSING, MEDICAL CARE, AND HEATING?: NOT HARD AT ALL

## 2022-07-25 NOTE — PROGRESS NOTES
Antonio Padilla (:  1990) is a 33 y.o. male,Established patient, here for evaluation of the following chief complaint(s):  Follow-up      Vitals:    22 1503   BP: 136/72   Pulse: 85   SpO2: 96%        ASSESSMENT/PLAN:  1. Annual physical exam    Seat belt use: yes  Smoke and carbon monoxide detectors within the home: yes  Sun screen use:yes  Diet: trying to eat a lot of veggies. Wants to cut back on surgery. Wanting to wean off pop. Currently having 1-2 pop per day. Gets lean protein. Fried food 4 x per week. Exercise: 3-4 days per week. Just had acl surgery. Fire arms: no  Dental exam: yes, 3 weeks ago  Eye exam: yes 1-2, education given     UTD on all vaccinations at this time. 2. Mixed hyperlipidemia  History this on prior labs. Obtain lipid panel  -     Comprehensive Metabolic Panel; Future  -     Lipid Panel; Future  3. Class 1 obesity due to excess calories without serious comorbidity with body mass index (BMI) of 34.0 to 34.9 in adult  BMI 34.9. Patient working on weight loss. Patient unable to run at this time secondary to recent ACL surgery. Discussed diet and exercise in detail. Obtain CMP and lipid panel  Return in about 6 months (around 2023). SUBJECTIVE/OBJECTIVE:  HPI    Patient is a 80-year-old male with past medical history of GERD, JULIAN and recent ACL tear who presents secondary to need for annual physical    Annual physical     Seat belt use: yes  Smoke and carbon monoxide detectors within the home: yes  Sun screen use:yes  Diet: trying to eat a lot of veggies. Wants to cut back on surgery. Wanting to wean off pop. Currently having 1-2 pop per day. Gets lean protein. Fried food 4 x per week. Exercise: 3-4 days per week. Just had acl surgery. Fire arms: no  Dental exam: yes, 3 weeks ago  Eye exam: yes 1-2, education given     UTD on all vaccinations at this time. Patient notes that he had his knee surgery (ACL on the left knee). Patient notes healing well. Patient is doing PT. Does have some pain around the patella when walking down steps. Patient continues to be concerned about because of his dad's death. Patient notes that he was being worked up for [employer] granulomatosis at the time of his death. The cause of his death was thought to be secondary to a arterial bleed in the brain. There was no true mention of aneurysm. He was on blood thinners however at the time and this was thought to worsen the bleeding. Patient has requested records be sent to me. Still awaiting patient's dad's records for further evaluation. Review of Systems ROS negative except for those noted in the HPI above. Vitals:    07/25/22 1503   BP: 136/72   Pulse: 85   SpO2: 96%     Physical Exam  Constitutional:       General: He is not in acute distress. Appearance: Normal appearance. He is not ill-appearing. HENT:      Head: Normocephalic and atraumatic. Nose: Nose normal. No congestion or rhinorrhea. Mouth/Throat:      Mouth: Mucous membranes are moist.      Pharynx: No oropharyngeal exudate or posterior oropharyngeal erythema. Eyes:      General: No scleral icterus. Extraocular Movements: Extraocular movements intact. Conjunctiva/sclera: Conjunctivae normal.   Cardiovascular:      Rate and Rhythm: Normal rate and regular rhythm. Pulses: Normal pulses. Heart sounds: No murmur heard. No friction rub. No gallop. Pulmonary:      Effort: Pulmonary effort is normal. No respiratory distress. Breath sounds: No wheezing, rhonchi or rales. Abdominal:      General: Bowel sounds are normal. There is no distension. Palpations: Abdomen is soft. Tenderness: There is no abdominal tenderness. There is no guarding or rebound. Musculoskeletal:      Cervical back: No muscular tenderness. Right lower leg: No edema. Left lower leg: No edema. Lymphadenopathy:      Cervical: No cervical adenopathy.    Skin:     General: Skin is warm.      Findings: No erythema or rash. Neurological:      General: No focal deficit present. Mental Status: He is alert and oriented to person, place, and time. Psychiatric:         Mood and Affect: Mood normal.         Behavior: Behavior normal.       An electronic signature was used to authenticate this note.     --Althea Goodman, DO

## 2022-08-01 ENCOUNTER — HOSPITAL ENCOUNTER (OUTPATIENT)
Dept: PHYSICAL THERAPY | Age: 32
Setting detail: THERAPIES SERIES
Discharge: HOME | End: 2022-08-01
Payer: COMMERCIAL

## 2022-08-01 PROCEDURE — 97110 THERAPEUTIC EXERCISES: CPT

## 2022-08-01 PROCEDURE — 97530 THERAPEUTIC ACTIVITIES: CPT

## 2022-08-01 PROCEDURE — 97112 NEUROMUSCULAR REEDUCATION: CPT

## 2022-08-01 NOTE — PROGRESS NOTES
[place], [address]    Phone: [phone]   Fax: [phone]    Isokinetic Strength Testing Results Summary  Kneeatient Name:  Blas White    :  1990  MRN: [id number]  Restrictions/Precautions:   Medical/Treatment Diagnosis Information:    S/P ACL /LME        Insurance/Certification information:     Physician Information:     Pain level: 0/10    Latex Allergy:  [x]NO      []YES  Preferred Language for Healthcare:   [x]English       []other:    Visit # Insurance Allowable Auth required? Date Range    [x]  Yes  []  No      Pain level:  0/10     SUBJECTIVE:  Pt reports that his knee feels stronger than it previously has. Pt has been able to get to the gym 3x/week to try and push his strength. Pt states that he still has some discomfort under patella especially with going down steps. Hernandez Fears      OBJECTIVE:  Observation:   Test measurements:    3/15: steristrips intact, no drainage or overt s/sx of infection  3/28/22: L knee ext AROM = lacking 5 at beginning of session, 0 with PROM/OP; L knee flex AAROM = 113  22: L knee flex AAROM = 124 (w/wall slides)  22: L knee ext AROM = lacking 1 at rest, 0 with QS; flex AAROM = 135 (w/wall slides)  LEFS = 46/80 = 42% disability   Less visible distal patellar swelling, fat pad swelling today compared to  session. Improved ambulation with no antalgia, has B varus alignment  5/10 Pt has mild TTP below the patella/ infrapatellar fat pad (hoffas fat pad)     MMT = R knee flexion and extension = 5/5 . MMT L knee flexion and extension = 5-/5     LEFS = 47/80 = 41% disability. Mid patella circumferencal measurements R = 39.25 cm.  L = 41.0 cm.   22:  ROM LEFT RIGHT   Knee ext 0 0   Knee Flex 140 141   Strength (measured in lbs using hand held dynamometer) LEFT RIGHT   HIP Flexors  67.4 76.2   HIP Abductors NT today due to issues with dynamometer NT today due to issues with dynamometer   Knee EXT (quad) 67.0 54.9   Knee Flex (HS) NT today due to issues with dynamometer NT today due to issues with dynamometer     7/19/22 LEFS  53/80    RESTRICTIONS/PRECAUTIONS: s/p L ACLR w/ quad tendon allograft and partial lateral meniscectomy (2/25/22), hx of L ACLR (10 years ago)    Exercises/Interventions:    Therapeutic Ex 25' Resistance Sets/sec Reps Notes   Elliptical/ BIKE 3' fwd/ bwd 5' 7/19   Incline calf stretch  Hamstring stretch EOB  Standing quad stretch  30\" 3x ea 7/19    MH ABD/HIP flex  B 75# 3 10x  7/6   TKE  75# 2 10  5 sec           SLR +  30\" 3x 4/ 21   Bridges SL  W/ triple threat SB  15x  7/6                 TR @ gym    5/ 10 [employer]   Supine SLR flex 3# 2 10 With BFR   Hip abduction SLR  3#   With BFR   Squats on airex  2 10 4/ 28   Wall slides for knee flexion  5\" 15    BFR  8'  See below  SLR flex, SLR abd, fwd step ups, wall sits  4/ 21   Leg press  ECC 0/90  #   110# 2  2 10x  15    7/6   HS curls  SL  40# 2 15x  7/6     Leg ext 90/3 30# 2 15    Wall sits  w/BS   1min 3/4x  almost daily      Therapeutic Activities 10' t educated with RT gym with plan for strengthening, joint protection/ ROM limitations with leg extensions etc. 7/19 BIODEX results   Forward step ups with R LE flexion 6\" 1 15    Objective measures  6' 7/19 see above   Sports cord circuit  Fwd(2 cones) and side stepping w/ 1 cone  SLS w/ lateral stepping at end  10x ea  7/6          SLS cone pick ups from 8\" step 5 cones 1 2x    Chilean split squat off high low  2 10 With mild HHA   NMR re-education 25'       LSD   4\" 2 10    SLS w/  3 ball toss  airex 10x in row   5/ 10          Lateral stepping  Monster walks Wine + blue 1 3 laps 7/6   RDL's w/ 10#KB  2 10 7/6   Bosu squats 10# KB 2 10 7/6   Retro and lateral slider lunges 10# KB 1 15x 5/ 10    Bosu lunges fwd  1 15x 5/ 10   Ladder drills slow  5'   Lateral, sideways, forward, backward   Step up with tactile cueing from SPT for quad control w/ blue TB strap 6\" 2 10 6/8   3/4 squats with TRX bands   1 10      TRX with SL squat to chair + aerex for balance  1 15x 6/8   Quarter squats with TRX bands   1 10             Objective:  Isokinetic Test Results:  7/19/22  Bilateral Difference:  Quadricep 180 deg/sec: 55.6% [x] Deficit   [] Surplus 300 deg/sec: 37.1% [x] Deficit   [] Surplus   Hamstring 180 deg/sec: 22.5% [x] Deficit   [] Surplus 300 deg/sec: 17.3% [x] Deficit   [] Surplus     Normative Data, 180 degrees/second:  Quadricep Normal:  60% Patient: 27.1%   Hamstring Normal:   45% Patient: 33.7%     Normative Data, 300 degrees/second:  Quadricep Normal: 50% Patient: 23.8%   Hamstring Normal: 40% Patient: 25.3%       FUNCTIONAL TESTING      Y balance Test Left Right Deficit   Fwd      Retro Lateral      Retro cross-behind                  Single Leg Squat testing 1 min. Left Right  Deficit               Single Leg Hop for Distance 6yrds  Left  Right Deficit                     Triple Hop for distance  Left Right Deficit                  EXERCISES                                               Goals:      Charges:  Timed Code Treatment Minutes: 60   Total Treatment Minutes: 60     [] EVAL  [x] SD(05611) x2   [] IONTO  [x] NMR (79915) x  1   [] VASO  [] Manual (02083) x       [] Other: Physical Performance Test (33377) x  2   [x] TA x  1    [] Mech Traction (88651)  [] ES(attended) (20242)      [] ES (un) (48368): The findings of this test would result with the following summary and recommendations:  Pt tolerated isokinetic testing well. Deficits throughout compared to uninvolved and expected ranges.      Return to Play:    [x]  Stage 1: Intro to Strength   [x]  Stage 2: progress strength but no active jogging yet   []  Stage 3: Return to Jump and Strength   []  Stage 4: Dynamic Strength and Agility   []  Stage 5: Sport Specific Training     []  Ready to Return to Play, Meets All Above Stages   [x]  Not Ready for Return to Sports   Comments:   must decreased PTBWT ratios fpr Quadriceps/ hamstrings as able prior to higher level functional activities. Sincerely,  Mena Mercado, PT      8/1/2022                   Therapeutic Exercise and NMR EXR  [x] (91503) Provided verbal/tactile cueing for activities related to strengthening, flexibility, endurance, ROM for improvements in LE, proximal hip, and core control with self care, mobility, lifting, ambulation.  [] (19757) Provided verbal/tactile cueing for activities related to improving balance, coordination, kinesthetic sense, posture, motor skill, proprioception  to assist with LE, proximal hip, and core control in self care, mobility, lifting, ambulation and eccentric single leg control.      NMR and Therapeutic Activities:    [] (71932 or 29977) Provided verbal/tactile cueing for activities related to improving balance, coordination, kinesthetic sense, posture, motor skill, proprioception and motor activation to allow for proper function of core, proximal hip and LE with self care and ADLs  [] (42821) Gait Re-education- Provided training and instruction to the patient for proper LE, core and proximal hip recruitment and positioning and eccentric body weight control with ambulation re-education including up and down stairs     Home Exercise Program:  see above for suggestions for GYM program 7/19  [x] (56524) Reviewed/Progressed HEP activities related to strengthening, flexibility, endurance, ROM of core, proximal hip and LE for functional self-care, mobility, lifting and ambulation/stair navigation   [] (24321)Reviewed/Progressed HEP activities related to improving balance, coordination, kinesthetic sense, posture, motor skill, proprioception of core, proximal hip and LE for self care, mobility, lifting, and ambulation/stair navigation      Manual Treatments:  PROM / STM / Oscillations-Mobs:  G-I, II, III, IV (PA's, Inf., Post.)  [x] (66289) Provided manual therapy to mobilize LE, proximal hip and/or LS spine soft tissue/joints for the purpose of modulating pain, promoting relaxation,  increasing ROM, reducing/eliminating soft tissue swelling/inflammation/restriction, improving soft tissue extensibility and allowing for proper ROM for normal function with self care, mobility, lifting and ambulation. 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 Independent in HEP and progression per patient tolerance, in order to prevent re-injury. [] Progressing: [x] Met: [] Not Met: [] Adjusted  2. Patient will have a decrease in pain to facilitate improvement in movement, function, and ADLs as indicated by Functional Deficits. [] Progressing: [x] Met: [] Not Met: [] Adjusted     Long Term Goals: To be achieved in: 8 weeks  1. Disability index score of 25% or less for the LEFS to assist with reaching prior level of function. [x] Progressing: [] Met: [] Not Met: [] Adjusted  2. Patient will demonstrate increased AROM to WNL to allow for proper joint functioning as indicated by patients Functional Deficits. [] Progressing: [x] Met: [] Not Met: [] Adjusted  3. Patient will demonstrate an increase in Strength to good proximal hip strength and control, within 5lb HHD in LE to allow for proper functional mobility as indicated by patients Functional Deficits. [x] Progressing: [] Met: [] Not Met: [] Adjusted  4. Patient will return to 15+ minutes of ambulation without increased symptoms or restriction to return to PLOF. [] Progressing: [x] Met: [] Not Met: [] Adjusted  5. Patient will tolerate 25+ minutes of sitting without increased symptoms or restriction to return to PLOF. [] Progressing: [x] Met: [] Not Met: [] Adjusted       Progression Towards Functional goals:  [x] Patient is progressing as expected towards functional goals listed.     [] Progression is slowed due to complexities listed. [] Progression has been slowed due to co-morbidities. [] Plan just implemented, too soon to assess goals progression  [] Other:       Treatment/Activity Tolerance:  [x] Patient tolerated treatment well [] Patient limited by fatique  [] Patient limited by pain  [] Patient limited by other medical complications  [x] Other: Pt tolerated exercise progressions well today. Improved eccentric quad strength and control noted with progressions, but pt was very fatigued at end of session. Overall Progression Towards Functional goals/ Treatment Progress Update:  [x] Patient is progressing as expected towards functional goals listed. [] Progression is slowed due to complexities/Impairments listed. [] Progression has been slowed due to co-morbidities. [] Plan just implemented, too soon to assess goals progression <30days   [] Goals require adjustment due to lack of progress  [] Patient is not progressing as expected and requires additional follow up with physician  [] Other    Prognosis for POC: [x] Good [] Fair  [] Poor    Patient requires continued skilled intervention: [x] Yes  [] No        PLAN:  Pt to transition to [facility] and follow up with formal PT for Biodex alysa Izaguirre 8/30  [x] Continue per plan of care [] Alter current plan (see comments)  [] Plan of care initiated [] Hold pending MD visit [] Discharge    Electronically signed by:  Rene Yanez PT, DPT      Note: If patient does not return for scheduled/recommended follow up visits, this note will serve as a discharge from care along with the most recent update on progress.

## 2022-08-05 DIAGNOSIS — E78.2 MIXED HYPERLIPIDEMIA: ICD-10-CM

## 2022-08-05 LAB
A/G RATIO: 2 (ref 1.1–2.2)
ALBUMIN SERPL-MCNC: 4.5 G/DL (ref 3.4–5)
ALP BLD-CCNC: 31 U/L (ref 40–129)
ALT SERPL-CCNC: 21 U/L (ref 10–40)
ANION GAP SERPL CALCULATED.3IONS-SCNC: 8 MMOL/L (ref 3–16)
AST SERPL-CCNC: 19 U/L (ref 15–37)
BILIRUB SERPL-MCNC: 0.5 MG/DL (ref 0–1)
BUN BLDV-MCNC: 13 MG/DL (ref 7–20)
CALCIUM SERPL-MCNC: 9.2 MG/DL (ref 8.3–10.6)
CHLORIDE BLD-SCNC: 106 MMOL/L (ref 99–110)
CHOLESTEROL, TOTAL: 190 MG/DL (ref 0–199)
CO2: 26 MMOL/L (ref 21–32)
CREAT SERPL-MCNC: 1.2 MG/DL (ref 0.9–1.3)
GFR AFRICAN AMERICAN: >60
GFR NON-AFRICAN AMERICAN: >60
GLUCOSE BLD-MCNC: 88 MG/DL (ref 70–99)
HDLC SERPL-MCNC: 37 MG/DL (ref 40–60)
LDL CHOLESTEROL CALCULATED: 104 MG/DL
POTASSIUM SERPL-SCNC: 4.4 MMOL/L (ref 3.5–5.1)
SODIUM BLD-SCNC: 140 MMOL/L (ref 136–145)
TOTAL PROTEIN: 6.7 G/DL (ref 6.4–8.2)
TRIGL SERPL-MCNC: 246 MG/DL (ref 0–150)
VLDLC SERPL CALC-MCNC: 49 MG/DL

## 2022-08-17 ENCOUNTER — HOSPITAL ENCOUNTER (OUTPATIENT)
Dept: PHYSICAL THERAPY | Age: 32
Setting detail: THERAPIES SERIES
Discharge: HOME | End: 2022-08-17

## 2022-08-17 PROCEDURE — 9990000027 HC GAP GROUP: Performed by: SPECIALIST/TECHNOLOGIST

## 2022-08-17 NOTE — FLOWSHEET NOTE
[facility]    Phone: [phone]   Fax: [phone]    Isokinetic Strength Testing Results Summary  Kneeatient Name:  Nori Crew    :  1990  MRN: [id number]  Restrictions/Precautions:   Medical/Treatment Diagnosis Information:    S/P ACL /LME        Insurance/Certification information:     Physician Information:     Pain level: 0/10    Latex Allergy:  [x]NO      []YES  Preferred Language for Healthcare:   [x]English       []other:    Visit # Insurance Allowable Auth required? Date Range    GAP , [x]  Yes  []  No      Pain level:  0/10     SUBJECTIVE:   next week 6 months s/p Knee workouts going well, no issues with his knee or swelling. Getting at least 3-4x workouts per week. Nuris   Advised to push Dr appphilipp back until mid/ late Sept. Still has pain going down stairs. Pt has increased his wts with muscle shaking and tremors etc. Noticed throughout. OBJECTIVE:  Observation:   Test measurements:    3/15: steristrips intact, no drainage or overt s/sx of infection  3/28/22: L knee ext AROM = lacking 5 at beginning of session, 0 with PROM/OP; L knee flex AAROM = 113  22: L knee flex AAROM = 124 (w/wall slides)  22: L knee ext AROM = lacking 1 at rest, 0 with QS; flex AAROM = 135 (w/wall slides)  LEFS = 46/80 = 42% disability   Less visible distal patellar swelling, fat pad swelling today compared to  session. Improved ambulation with no antalgia, has B varus alignment  5/10 Pt has mild TTP below the patella/ infrapatellar fat pad (hoffas fat pad)     MMT = R knee flexion and extension = 5/5 . MMT L knee flexion and extension = 5-/5     LEFS = 47/80 = 41% disability. Mid patella circumferencal measurements R = 39.25 cm.  L = 41.0 cm.   22:  ROM LEFT RIGHT   Knee ext 0 0   Knee Flex 140 141   Strength (measured in lbs using hand held dynamometer) LEFT RIGHT   HIP Flexors  67.4 76.2   HIP Abductors NT today due to issues with dynamometer NT today due to issues with dynamometer   Knee EXT (quad) 67.0 54.9   Knee Flex (HS) NT today due to issues with dynamometer NT today due to issues with dynamometer     7/19/22 LEFS  53/80    RESTRICTIONS/PRECAUTIONS: s/p L ACLR w/ quad tendon allograft and partial lateral meniscectomy (2/25/22), hx of L ACLR (10 years ago)    Exercises/Interventions:    Therapeutic Ex 25' Resistance Sets/sec Reps Notes   Elliptical/ BIKE 3' fwd/ bwd 6' 8/17   Incline calf stretch  Hamstring stretch EOB  Standing quad stretch  30\" 3x ea 7/19    MH ABD/HIP flex  B 75# 3 10x  7/6   TKE  75# 2 10  5 sec           SLR +  30\" 3x 4/ 21   Bridges SL  W/ triple threat SB  15x  7/6                 TR @ gym    5/ 10 [employer]   Supine SLR flex 3# 2 10 With BFR   Hip abduction SLR  3#   With BFR   Squats on airex  2 10 4/ 28   Wall slides for knee flexion  5\" 15           Leg press  ECC 0/90  #   110# 2  2 10x  15    7/6   HS curls  SL  40# 2 15x  7/6     Leg ext 90/30 30# 2 15    Wall sits  w/BS   1min 3/4x  almost daily      Therapeutic Activities 10' t educated with RT gym with plan for strengthening, joint protection/ ROM limitations with leg extensions etc. 7/19 BIODEX results   Forward step ups with R LE flexion 6\" 1 15    Objective measures  6' 7/19 see above   Sports cord circuit  4D fwd/ rev & side SLS w/ lateral stepping at end  15\" 4x 8/17   4 square jumping B 1-2,1-4,4-2 3-1,  cw/ ccw   Scissor lunge/  jumping      Small dist 10\"  ea 8/17   Broad jumps  B   x10 8/17   SLS cone pick ups from 8\" step 5 cones 1 2x    Japanese split squat off high low  2 10 With mild HHA   anual Intervention  NMR re-education 25'       LSD   4\" 2 10    SLS w/  3 ball toss  airex 10x in row   5/ 10          Lateral stepping  Monster walks Wine + blue 1 3 laps 7/6   RDL's w/ 10#KB  2 10 7/6    SIT/STAND Left only 23\" from floor  2 15x 8/17   Retro and lateral slider lunges 10# KB 1 15x 5/ 10 Bosu lunges fwd  1 15x 5/ 10   Ladder drills 1/2 speed 5'   Lateral, sideways, forward, backward 8/17   Step up with tactile cueing from SPT for quad control w/ blue TB strap 6\" 2 10 6/8   3/4 squats with TRX bands   1 10      TRX with SL squat to chair + aerex for balance  1 15x 6/8   Quarter squats with TRX bands   1 10     Squats w/ landmine    Deadlift from floor  Bar + 20# 2 10xea 8/17            Objective:  Isokinetic Test Results:  7/19/22  Bilateral Difference:  Quadricep 180 deg/sec: 55.6% [x] Deficit   [] Surplus 300 deg/sec: 37.1% [x] Deficit   [] Surplus   Hamstring 180 deg/sec: 22.5% [x] Deficit   [] Surplus 300 deg/sec: 17.3% [x] Deficit   [] Surplus     Normative Data, 180 degrees/second:  Quadricep Normal:  60% Patient: 27.1%   Hamstring Normal:   45% Patient: 33.7%     Normative Data, 300 degrees/second:  Quadricep Normal: 50% Patient: 23.8%   Hamstring Normal: 40% Patient: 25.3%       FUNCTIONAL TESTING      Y balance Test Left Right Deficit   Fwd      Retro Lateral      Retro cross-behind                  Single Leg Squat testing 1 min. Left Right  Deficit               Single Leg Hop for Distance 6yrds  Left  Right Deficit                     Triple Hop for distance  Left Right Deficit                  EXERCISES                                               Goals:      Charges:  Timed Code Treatment Minutes: 60   Total Treatment Minutes: 60     [] EVAL  [x] DT(31957) x2   [] IONTO  [x] NMR (32105) x  1   [] VASO  [] Manual (13865) x       [] Other: Physical Performance Test (83032) x  2   [x] TA x  1    [] Mech Traction (52168)  [] ES(attended) (88600)      [] ES (un) (62681): The findings of this test would result with the following summary and recommendations:  Pt tolerated isokinetic testing well. Deficits throughout compared to uninvolved and expected ranges.      Return to Play:    [x]  Stage 1: Intro to Strength   [x]  Stage 2: progress strength but no active jogging yet   [] Stage 3: Return to Jump and Strength   []  Stage 4: Dynamic Strength and Agility   []  Stage 5: Sport Specific Training     []  Ready to Return to Play, Meets All Above Stages   [x]  Not Ready for Return to Sports   Comments:   must decreased PTBWT ratios fpr Quadriceps/ hamstrings as able prior to higher level functional activities. Sincerely,  Neil Kwon, PTA      8/17/2022                   Therapeutic Exercise and NMR EXR  [x] (52374) Provided verbal/tactile cueing for activities related to strengthening, flexibility, endurance, ROM for improvements in LE, proximal hip, and core control with self care, mobility, lifting, ambulation.  [] (00398) Provided verbal/tactile cueing for activities related to improving balance, coordination, kinesthetic sense, posture, motor skill, proprioception  to assist with LE, proximal hip, and core control in self care, mobility, lifting, ambulation and eccentric single leg control.      NMR and Therapeutic Activities:    [] (15157 or 73896) Provided verbal/tactile cueing for activities related to improving balance, coordination, kinesthetic sense, posture, motor skill, proprioception and motor activation to allow for proper function of core, proximal hip and LE with self care and ADLs  [] (64552) Gait Re-education- Provided training and instruction to the patient for proper LE, core and proximal hip recruitment and positioning and eccentric body weight control with ambulation re-education including up and down stairs     Home Exercise Program:  see above for suggestions for GYM program 7/19  [x] (15906) Reviewed/Progressed HEP activities related to strengthening, flexibility, endurance, ROM of core, proximal hip and LE for functional self-care, mobility, lifting and ambulation/stair navigation   [] (91292)Reviewed/Progressed HEP activities related to improving balance, coordination, kinesthetic sense, posture, motor skill, proprioception of core, proximal hip and LE for self care, mobility, lifting, and ambulation/stair navigation      Manual Treatments:  PROM / STM / Oscillations-Mobs:  G-I, II, III, IV (PA's, Inf., Post.)  [x] (01563) Provided manual therapy to mobilize LE, proximal hip and/or LS spine soft tissue/joints for the purpose of modulating pain, promoting relaxation,  increasing ROM, reducing/eliminating soft tissue swelling/inflammation/restriction, improving soft tissue extensibility and allowing for proper ROM for normal function with self care, mobility, lifting and ambulation. 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 Independent in HEP and progression per patient tolerance, in order to prevent re-injury. [] Progressing: [x] Met: [] Not Met: [] Adjusted  2. Patient will have a decrease in pain to facilitate improvement in movement, function, and ADLs as indicated by Functional Deficits. [] Progressing: [x] Met: [] Not Met: [] Adjusted     Long Term Goals: To be achieved in: 8 weeks  1. Disability index score of 25% or less for the LEFS to assist with reaching prior level of function. [x] Progressing: [] Met: [] Not Met: [] Adjusted  2. Patient will demonstrate increased AROM to WNL to allow for proper joint functioning as indicated by patients Functional Deficits. [] Progressing: [x] Met: [] Not Met: [] Adjusted  3. Patient will demonstrate an increase in Strength to good proximal hip strength and control, within 5lb HHD in LE to allow for proper functional mobility as indicated by patients Functional Deficits. [x] Progressing: [] Met: [] Not Met: [] Adjusted  4. Patient will return to 15+ minutes of ambulation without increased symptoms or restriction to return to PLOF. [] Progressing: [x] Met: [] Not Met: [] Adjusted  5.  Patient will tolerate 25+ minutes of sitting without increased symptoms or restriction to return to PLOF. [] Progressing: [x] Met: [] Not Met: [] Adjusted       Progression Towards Functional goals:  [x] Patient is progressing as expected towards functional goals listed. [] Progression is slowed due to complexities listed. [] Progression has been slowed due to co-morbidities. [] Plan just implemented, too soon to assess goals progression  [] Other:       Treatment/Activity Tolerance:  [x] Patient tolerated treatment well [] Patient limited by fatique  [] Patient limited by pain  [] Patient limited by other medical complications  [x] Other: Pt tolerated exercise progressions well today w/ jumping including B broad jumps, box jumps, scissor jumps,  off 6\" step B/ DL jumps, and 4 square in 10\" intervals,step ups w/TB ant pulls, and  w/ landmine squats / deadlifts. Denied pain with tendon and jumping etc today. Improved eccentric quad strength and control noted with progressions, but pt was very fatigued at end of session. Overall Progression Towards Functional goals/ Treatment Progress Update:  [x] Patient is progressing as expected towards functional goals listed. [] Progression is slowed due to complexities/Impairments listed. [] Progression has been slowed due to co-morbidities. [] Plan just implemented, too soon to assess goals progression <30days   [] Goals require adjustment due to lack of progress  [] Patient is not progressing as expected and requires additional follow up with physician  [] Other    Prognosis for POC: [x] Good [] Fair  [] Poor    Patient requires continued skilled intervention: [x] Yes  [] No        PLAN:  Pt to transition to [employer] and follow up with formal PT for Biodex prn.  Push Nuris atkinson back late SEPT w/ biodex 8/30  [x] Continue per plan of care [] Alter current plan (see comments)  [] Plan of care initiated [] Hold pending MD visit [] Discharge    Electronically signed by: Niru Goode, PTA [id number], PT, DPT

## 2022-08-31 ENCOUNTER — HOSPITAL ENCOUNTER (OUTPATIENT)
Dept: PHYSICAL THERAPY | Age: 32
Setting detail: THERAPIES SERIES
Discharge: HOME | End: 2022-08-31

## 2022-08-31 PROCEDURE — 9990000027 HC GAP GROUP: Performed by: SPECIALIST/TECHNOLOGIST

## 2022-08-31 NOTE — FLOWSHEET NOTE
[facility]    Phone: [phone]   Fax: [phone]    Isokinetic Strength Testing Results Summary  Kneeatient Name:  Beckie Pierce    :  1990  MRN: [id number]  Restrictions/Precautions:   Medical/Treatment Diagnosis Information:    S/P ACL /LME        Insurance/Certification information:     Physician Information:     Pain level: 0/10    Latex Allergy:  [x]NO      []YES  Preferred Language for Healthcare:   [x]English       []other:    Visit # Insurance Allowable Auth required? Date Range   28  2  30  , [x]  Yes  []  No      Pain level:  0/10     SUBJECTIVE:   6 months s/p   Radha Bayw  Pt reports knee having some soreness over his patellar tendon from workouts but ice will help. Is doing workouts at home and at gym. Some soreness after jumping last session but nothing significant. Stretching and doing more to push strength    OBJECTIVE:  Observation:   Test measurements:    3/15: steristrips intact, no drainage or overt s/sx of infection  3/28/22: L knee ext AROM = lacking 5 at beginning of session, 0 with PROM/OP; L knee flex AAROM = 113  22: L knee flex AAROM = 124 (w/wall slides)  22: L knee ext AROM = lacking 1 at rest, 0 with QS; flex AAROM = 135 (w/wall slides)  LEFS = 46/80 = 42% disability   Less visible distal patellar swelling, fat pad swelling today compared to  session. Improved ambulation with no antalgia, has B varus alignment  5/10 Pt has mild TTP below the patella/ infrapatellar fat pad (hoffas fat pad)     MMT = R knee flexion and extension = 5/5 . MMT L knee flexion and extension = 5-/5     LEFS = 47/80 = 41% disability. Mid patella circumferencal measurements R = 39.25 cm.  L = 41.0 cm.   22:  ROM LEFT RIGHT   Knee ext 0 0   Knee Flex 140 141   Strength (measured in lbs using hand held dynamometer) LEFT RIGHT   HIP Flexors  67.4 76.2   HIP Abductors NT today due to issues with dynamometer NT today due to issues with dynamometer   Knee EXT (quad) 67.0 54.9   Knee Flex (HS) NT today due to issues with dynamometer NT today due to issues with dynamometer     7/19/22 LEFS  53/80    RESTRICTIONS/PRECAUTIONS: s/p L ACLR w/ quad tendon allograft and partial lateral meniscectomy (2/25/22), hx of L ACLR (10 years ago)    Exercises/Interventions:    Therapeutic Ex 25' Resistance Sets/sec Reps Notes   Elliptical/ BIKE 3' fwd/ bwd 6' 8/17   Incline calf stretch  Hamstring stretch EOB  Standing quad stretch  30\" 3x ea 7/19    MH ABD/HIP flex  B 75# 3 10x  7/6   TKE  75# 2 10  5 sec           SLR +  30\" 3x 4/ 21   Bridges SL  W/ triple threat SB  15x  7/6                 TR @ gym    5/ 10 [employer]   Squats on airex  2 10 4/ 28   Wall slides for knee flexion  5\" 15           Leg press  ECC 0/90  #   110# 2  2 10x  15    7/6   HS curls  SL  40# 2 15x  8/31^ burnout sets      Leg ext 90/30 30# 2 15 8/31 ^burn out sests    Wall sits  w/BS   1min 3/4x  almost daily     Therapeutic Activities 10' t educated with RT gym with plan for strengthening, joint protection/ ROM limitations with leg extensions etc. 7/19 BIODEX results   Forward step ups with R LE flexion 6\" 1 15    Objective measures  6' 7/19 see above   Sports cord circuit  4D fwd/ rev & side SLS w/ lateral stepping at end  15\" 4x 8/17   4 square jumping B 1-2,1-4,4-2 3-1,  cw/ ccw   Scissor lunge/  jumping      Small dist 10\"  ea 8/17   Broad jumps  B   SL hopping/ triple hop/ crossover/ 6m hop test    SL Sit/stand for 1 min  15'  x10 8/31  practice   SLS cone pick ups from 8\" step 5 cones 1 2x    Kiswahili split squat off high low  2 10 With mild HHA   6\" step up/ hop down   12\"/ 18' /24\" step up B down     SL hop ups and SL hop downs on smaller steps  8/31   Combinations of SL and D   anual Intervention  NMR re-education 25'       LSD   4\" 2 10    SLS w/  3 ball toss  airex 10x in row   5/10          Lateral stepping  Monster walks Wine + blue 1 3 laps 7/6   RDL's w/ 10#KB  2 10 7/6    SIT/STAND Left only 23\" from floor  2 15x 8/17   Retro and lateral slider lunges 10# KB 1 15x 5/ 10    Bosu lunges fwd  1 15x 5/ 10   Ladder drills 1/2 speed 5'   Lateral, sideways, forward, backward 8/31   Step up with tactile cueing from SPT for quad control w/ blue TB strap 6\" 2 10 6/8   3/4 squats with TRX bands   1 10      TRX with SL squat to chair + aerex for balance  1 15x 6/8   Quarter squats with TRX bands   1 10     Squats w/ landmine    Deadlift from floor  Bar + 20# 2 10xea 8/17            Objective:  Isokinetic Test Results:  7/19/22  Bilateral Difference:  Quadricep 180 deg/sec: 55.6% [x] Deficit   [] Surplus 300 deg/sec: 37.1% [x] Deficit   [] Surplus   Hamstring 180 deg/sec: 22.5% [x] Deficit   [] Surplus 300 deg/sec: 17.3% [x] Deficit   [] Surplus     Normative Data, 180 degrees/second:  Quadricep Normal:  60% Patient: 27.1%   Hamstring Normal:   45% Patient: 33.7%     Normative Data, 300 degrees/second:  Quadricep Normal: 50% Patient: 23.8%   Hamstring Normal: 40% Patient: 25.3%       FUNCTIONAL TESTING      Y balance Test Left Right Deficit   Fwd      Retro Lateral      Retro cross-behind                  Single Leg Squat testing 1 min. Left Right  Deficit               Single Leg Hop for Distance 6yrds  Left  Right Deficit                     Triple Hop for distance  Left Right Deficit                  EXERCISES                                               Goals:      Charges:  Timed Code Treatment Minutes: 60   Total Treatment Minutes: 60     [] EVAL  [] BQ(67918) x2   [] IONTO  [] NMR (73500) x  1   [] VASO  [] Manual (49641) x       [] Other: Physical Performance Test (14273) x     [] TA x  1    [] Mech Traction (10913)  [] ES(attended) (62327)      [] ES (un) (65951): The findings of this test would result with the following summary and recommendations:  Pt tolerated isokinetic testing well.  Deficits throughout compared to uninvolved and expected ranges. Return to Play:    [x]  Stage 1: Intro to Strength   [x]  Stage 2: progress strength but no active jogging yet   []  Stage 3: Return to Jump and Strength   []  Stage 4: Dynamic Strength and Agility   []  Stage 5: Sport Specific Training     []  Ready to Return to Play, Meets All Above Stages   [x]  Not Ready for Return to Sports   Comments:   must decreased PTBWT ratios fpr Quadriceps/ hamstrings as able prior to higher level functional activities. Sincerely,  Pramod Ghosh, PTA      8/31/2022                   Therapeutic Exercise and NMR EXR  [x] (62862) Provided verbal/tactile cueing for activities related to strengthening, flexibility, endurance, ROM for improvements in LE, proximal hip, and core control with self care, mobility, lifting, ambulation.  [] (39874) Provided verbal/tactile cueing for activities related to improving balance, coordination, kinesthetic sense, posture, motor skill, proprioception  to assist with LE, proximal hip, and core control in self care, mobility, lifting, ambulation and eccentric single leg control.      NMR and Therapeutic Activities:    [] (11587 or 62909) Provided verbal/tactile cueing for activities related to improving balance, coordination, kinesthetic sense, posture, motor skill, proprioception and motor activation to allow for proper function of core, proximal hip and LE with self care and ADLs  [] (35452) Gait Re-education- Provided training and instruction to the patient for proper LE, core and proximal hip recruitment and positioning and eccentric body weight control with ambulation re-education including up and down stairs     Home Exercise Program:  see above for suggestions for GYM program 7/19  [x] (11151) Reviewed/Progressed HEP activities related to strengthening, flexibility, endurance, ROM of core, proximal hip and LE for functional self-care, mobility, lifting and ambulation/stair navigation   [] (91157)Reviewed/Progressed HEP activities related to improving balance, coordination, kinesthetic sense, posture, motor skill, proprioception of core, proximal hip and LE for self care, mobility, lifting, and ambulation/stair navigation      Manual Treatments:  PROM / STM / Oscillations-Mobs:  G-I, II, III, IV (PA's, Inf., Post.)  [x] (79261) Provided manual therapy to mobilize LE, proximal hip and/or LS spine soft tissue/joints for the purpose of modulating pain, promoting relaxation,  increasing ROM, reducing/eliminating soft tissue swelling/inflammation/restriction, improving soft tissue extensibility and allowing for proper ROM for normal function with self care, mobility, lifting and ambulation. 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 Independent in HEP and progression per patient tolerance, in order to prevent re-injury. [] Progressing: [x] Met: [] Not Met: [] Adjusted  2. Patient will have a decrease in pain to facilitate improvement in movement, function, and ADLs as indicated by Functional Deficits. [] Progressing: [x] Met: [] Not Met: [] Adjusted     Long Term Goals: To be achieved in: 8 weeks  1. Disability index score of 25% or less for the LEFS to assist with reaching prior level of function. [x] Progressing: [] Met: [] Not Met: [] Adjusted  2. Patient will demonstrate increased AROM to WNL to allow for proper joint functioning as indicated by patients Functional Deficits. [] Progressing: [x] Met: [] Not Met: [] Adjusted  3. Patient will demonstrate an increase in Strength to good proximal hip strength and control, within 5lb HHD in LE to allow for proper functional mobility as indicated by patients Functional Deficits. [x] Progressing: [] Met: [] Not Met: [] Adjusted  4.  Patient will return to 15+ minutes of ambulation without increased symptoms or restriction to return to PLOF. [] Progressing: [x] Met: [] Not Met: [] Adjusted  5. Patient will tolerate 25+ minutes of sitting without increased symptoms or restriction to return to PLOF. [] Progressing: [x] Met: [] Not Met: [] Adjusted       Progression Towards Functional goals:  [x] Patient is progressing as expected towards functional goals listed. [] Progression is slowed due to complexities listed. [] Progression has been slowed due to co-morbidities. [] Plan just implemented, too soon to assess goals progression  [] Other:       Treatment/Activity Tolerance:  [x] Patient tolerated treatment well [] Patient limited by fatique  [] Patient limited by pain  [] Patient limited by other medical complications  [x] Other: Pt tolerated exercise progressions well today w/ jumping including B broad jumps,  SL jumps/ triple hops/ crossover jumps today to prepare for functional testing. Box jumps off various heights with step ups and hop downs from B to SL landings. Pt reported some anxiety performing but was able to with stepping up and focusing on B landings versus Sl landing to initiate session. Denied pain with tendon and jumping etc today. Improved eccentric quad strength and control noted with progressions, but pt was very fatigued at end of session. Overall Progression Towards Functional goals/ Treatment Progress Update:  [x] Patient is progressing as expected towards functional goals listed. [] Progression is slowed due to complexities/Impairments listed. [] Progression has been slowed due to co-morbidities.   [] Plan just implemented, too soon to assess goals progression <30days   [] Goals require adjustment due to lack of progress  [] Patient is not progressing as expected and requires additional follow up with physician  [] Other    Prognosis for POC: [x] Good [] Fair  [] Poor    Patient requires continued skilled intervention: [x] Yes  [] No        PLAN:  [employer] on Sept 27 and [facility] Sept 9/29   [x] Continue per plan of care [] Alter current plan (see comments)  [] Plan of care initiated [] Hold pending MD visit [] Discharge    Electronically signed by: ROSSI Hooks, PT, DPT      Note: If patient does not return for scheduled/recommended follow up visits, this note will serve as a discharge from care along with the most recent update on progress.

## 2022-09-09 ENCOUNTER — OFFICE VISIT (OUTPATIENT)
Dept: PULMONOLOGY | Age: 32
End: 2022-09-09
Payer: COMMERCIAL

## 2022-09-09 VITALS
SYSTOLIC BLOOD PRESSURE: 110 MMHG | WEIGHT: 231.8 LBS | TEMPERATURE: 98.3 F | DIASTOLIC BLOOD PRESSURE: 70 MMHG | BODY MASS INDEX: 35.13 KG/M2 | OXYGEN SATURATION: 98 % | HEART RATE: 65 BPM | HEIGHT: 68 IN

## 2022-09-09 DIAGNOSIS — K21.9 GERD WITHOUT ESOPHAGITIS: Chronic | ICD-10-CM

## 2022-09-09 DIAGNOSIS — J30.9 CHRONIC ALLERGIC RHINITIS: Chronic | ICD-10-CM

## 2022-09-09 DIAGNOSIS — E66.09 CLASS 1 OBESITY DUE TO EXCESS CALORIES WITH SERIOUS COMORBIDITY AND BODY MASS INDEX (BMI) OF 32.0 TO 32.9 IN ADULT: Chronic | ICD-10-CM

## 2022-09-09 DIAGNOSIS — G47.33 OSA (OBSTRUCTIVE SLEEP APNEA): Primary | Chronic | ICD-10-CM

## 2022-09-09 PROCEDURE — G8417 CALC BMI ABV UP PARAM F/U: HCPCS | Performed by: NURSE PRACTITIONER

## 2022-09-09 PROCEDURE — 99214 OFFICE O/P EST MOD 30 MIN: CPT | Performed by: NURSE PRACTITIONER

## 2022-09-09 PROCEDURE — 1036F TOBACCO NON-USER: CPT | Performed by: NURSE PRACTITIONER

## 2022-09-09 PROCEDURE — G8427 DOCREV CUR MEDS BY ELIG CLIN: HCPCS | Performed by: NURSE PRACTITIONER

## 2022-09-09 ASSESSMENT — SLEEP AND FATIGUE QUESTIONNAIRES
HOW LIKELY ARE YOU TO NOD OFF OR FALL ASLEEP WHILE SITTING AND READING: 2
HOW LIKELY ARE YOU TO NOD OFF OR FALL ASLEEP WHILE SITTING QUIETLY AFTER LUNCH WITHOUT ALCOHOL: 1
HOW LIKELY ARE YOU TO NOD OFF OR FALL ASLEEP WHILE SITTING AND TALKING TO SOMEONE: 0
HOW LIKELY ARE YOU TO NOD OFF OR FALL ASLEEP WHILE WATCHING TV: 2
ESS TOTAL SCORE: 7
HOW LIKELY ARE YOU TO NOD OFF OR FALL ASLEEP WHILE LYING DOWN TO REST IN THE AFTERNOON WHEN CIRCUMSTANCES PERMIT: 1
HOW LIKELY ARE YOU TO NOD OFF OR FALL ASLEEP WHEN YOU ARE A PASSENGER IN A CAR FOR AN HOUR WITHOUT A BREAK: 1
HOW LIKELY ARE YOU TO NOD OFF OR FALL ASLEEP WHILE SITTING INACTIVE IN A PUBLIC PLACE: 0
HOW LIKELY ARE YOU TO NOD OFF OR FALL ASLEEP IN A CAR, WHILE STOPPED FOR A FEW MINUTES IN TRAFFIC: 0

## 2022-09-09 NOTE — PROGRESS NOTES
comorbidity and body mass index (BMI) of 32.0 to 32.9 in adult  Assessment & Plan:   Chronic-not stable:  Discussed importance of treating obstructive sleep apnea and getting sufficient sleep to assist with weight control. Encouraged him to work on weight loss through diet and exercise. Recommended DASH or Mediterranean diets. Reviewed, analyzed, and documented physiologic data from patient's PAP machine. This information was analyzed to assess complexity and medical decision making in regards to further testing and management. The primary encounter diagnosis was JULIAN (obstructive sleep apnea). Diagnoses of GERD without esophagitis, Chronic allergic rhinitis, and Class 1 obesity due to excess calories with serious comorbidity and body mass index (BMI) of 32.0 to 32.9 in adult were also pertinent to this visit. The chronic medical conditions listed are directly related to the primary diagnosis listed above. The management of the primary diagnosis affects the secondary diagnosis and vice versa. Subjective:   Subjective   Patient ID: Francisco Abdullahi is a 33 y.o. male. Chief Complaint   Patient presents with    Sleep Apnea       HPI:  Machine Modem/Download Info:  Compliance (hours/night): 6.2 hrs/night  % of nights >= 4 hrs: 87.8 %  Download AHI (/hour): 1.1 /HR  Average CPAP Pressure : 10 cmH2O      APAP - Settings  Pressure Min: 9 cmH2O  Pressure Max: 17 cmH2O                 Comfort Settings  Humidity Level (0-8): 4  Flex/EPR (0-3): 3 PAP Mask  Mask Type: Nasal mask     Francisco Abdullahi reports he is doing well with his machine. He has received his replacement machine for the  recall. He also has a travel machine that he has been waiting for the replacement. He reports he is sleeping well during the night and waking rested for the most part. He reports he could use more sleep and his life has been fairly hectic which causes him some sleepiness during the day.   The pressure on his machine is comfortable. struggles with seasonal allergies and nasal congestion and will use Flonase which seems to help. He reports if he does not use the Flonase he will take his mask off during the night without realizing. He occasionally will wake with a dry throat. He has not tried adjusting the moisture settings on his machine. he denies headaches, nosebleeds, aerophagia, or drowsiness while driving. His mask is comfortable and is fitting well. [address] license: Yes, Next physical: Unsure of when but thinks it is in 2023. 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 DME [employer]    [place] -Total score: 7    Social History     Socioeconomic History    Marital status: Single     Spouse name: Not on file    Number of children: Not on file    Years of education: Not on file    Highest education level: Not on file   Occupational History    Not on file   Tobacco Use    Smoking status: Never    Smokeless tobacco: Never   Vaping Use    Vaping Use: Never used   Substance and Sexual Activity    Alcohol use:  Yes     Alcohol/week: 2.0 standard drinks     Types: 2 Cans of beer per week     Comment: occassionally    Drug use: Never    Sexual activity: Never   Other Topics Concern    Not on file   Social History Narrative    Not on file     Social Determinants of Health     Financial Resource Strain: Low Risk     Difficulty of Paying Living Expenses: Not hard at all   Food Insecurity: No Food Insecurity    Worried About Running Out of Food in the Last Year: Never true    Ran Out of Food in the Last Year: Never true   Transportation Needs: Not on file   Physical Activity: Not on file   Stress: Not on file   Social Connections: Not on file   Intimate Partner Violence: Not on file   Housing Stability: Not on file       Current Outpatient Medications   Medication Instructions    famotidine (PEPCID) 20 mg, Oral, 2 TIMES DAILY    Fexofenadine HCl (ALLEGRA PO) Oral, DAILY PRN    fluticasone (FLONASE) 50 MCG/ACT nasal spray 2 sprays, Each Nostril, EVERY EVENING          Vitals:  Weight BMI   Wt Readings from Last 3 Encounters:   09/09/22 231 lb 12.8 oz (105.1 kg)   07/25/22 230 lb (104.3 kg)   05/31/22 228 lb 6.4 oz (103.6 kg)    Body mass index is 35.25 kg/m². BP HR SaO2   BP Readings from Last 3 Encounters:   09/09/22 110/70   07/25/22 136/72   03/04/22 (!) 140/82    Pulse Readings from Last 3 Encounters:   09/09/22 65   07/25/22 85   03/04/22 93    SpO2 Readings from Last 3 Encounters:   09/09/22 98%   07/25/22 96%   03/04/22 97%        Electronically signed by MARILEE Silva on 9/9/2022 at 4:18 PM    I have reviewed the above documentation completed by the Nurse Practitioner and agree. Compliance was reviewed, is compliant per FAA rules for obstructive sleep apnea and flying.     Alie Lau MD  Electronically signed by Alie Lau MD on 9/12/22 at 8:51 AM EDT

## 2022-09-09 NOTE — ASSESSMENT & PLAN NOTE
Chronic-Stable: Reviewed and analyzed results of physiologic download from patient's machine and reviewed with patient. Supplies and parts as needed for his machine. These are medically necessary. Limit caffeine use after 3pm. Based on the analyzed data will change following settings: P min increased to 10. Will trial pressure increase if this will help with daytime symptoms. Encouraged him to work on sleep hygiene and working to obtain more sleep each night. Discussed FAA guidelines of an average of at least 6 hours a night on his machine and that it is used at least 75% of the nights. Discussed no driving or flying when sleepy. Patient reports understanding. He continues to do well with his machine. He is complaint and getting good symptom control. He is encouraged to keep up with his machine. Will see him back in a 6 month f/u for DOT/FAA compliance check (he understands that he he needs to be seen every 6 months) unless there are issues, then he is to call for an earlier appointment.

## 2022-09-09 NOTE — LETTER
[facility]  [id number]  [place] [address] [id number]  Phone: [phone]  Fax: [phone]    September 9, 2022       Patient: Salaazr Espinoza   MR Number: [id number]   YOB: 1990   Date of Visit: 9/9/2022       Bri Nava was seen for a follow up visit today. Here is my assessment and plan as well as an attached copy of his visit today:    GERD without esophagitis   Chronic- Stable. Discussed the importance of treating obstructive sleep apnea as part of the management of this disorder. Cont any meds per PCP and other physicians. Chronic allergic rhinitis   Chronic- Stable. Discussed the importance of treating obstructive sleep apnea as part of the management of this disorder. Cont any meds per PCP and other physicians. Class 1 obesity due to excess calories with serious comorbidity and body mass index (BMI) of 32.0 to 32.9 in adult   Chronic-not stable:  Discussed importance of treating obstructive sleep apnea and getting sufficient sleep to assist with weight control. Encouraged him to work on weight loss through diet and exercise. Recommended DASH or Mediterranean diets. JULIAN (obstructive sleep apnea)  Chronic-Stable: Reviewed and analyzed results of physiologic download from patient's machine and reviewed with patient. Supplies and parts as needed for his machine. These are medically necessary. Limit caffeine use after 3pm. Based on the analyzed data will change following settings: P min increased to 10. Will trial pressure increase if this will help with daytime symptoms. Encouraged him to work on sleep hygiene and working to obtain more sleep each night. Discussed FAA guidelines of an average of at least 6 hours a night on his machine and that it is used at least 75% of the nights. Discussed no driving or flying when sleepy. Patient reports understanding. He continues to do well with his machine.   He is complaint and getting good symptom control. He is encouraged to keep up with his machine. Will see him back in a 6 month f/u for DOT/FAA compliance check (he understands that he he needs to be seen every 6 months) unless there are issues, then he is to call for an earlier appointment. If you have questions or concerns, please do not hesitate to call me. I look forward to following Edgard Meade along with you.     Sincerely,    MARILEE Cooper    CC providers:  [facility] Blankenship [phone] [facility]

## 2022-09-14 ENCOUNTER — HOSPITAL ENCOUNTER (OUTPATIENT)
Dept: PHYSICAL THERAPY | Age: 32
Setting detail: THERAPIES SERIES
Discharge: HOME | End: 2022-09-14
Payer: COMMERCIAL

## 2022-09-14 PROCEDURE — 9990000027 HC GAP GROUP: Performed by: SPECIALIST/TECHNOLOGIST

## 2022-09-14 NOTE — FLOWSHEET NOTE
(quad) 67.0 54.9   Knee Flex (HS) NT today due to issues with dynamometer NT today due to issues with dynamometer     7/19/22 LEFS  53/80    RESTRICTIONS/PRECAUTIONS: s/p L ACLR w/ quad tendon allograft and partial lateral meniscectomy (2/25/22), hx of L ACLR (10 years ago)    Exercises/Interventions:    Therapeutic Ex 25' Resistance Sets/sec Reps Notes   Elliptical/ BIKE 3' fwd/ bwd 6'    8/17   Incline calf stretch  Hamstring stretch EOB  Standing quad stretch  30\" 3x ea 7/19    MH ABD/HIP flex  B 75# 3 10x  7/6   TKE  75# 2 10  5 sec           SLR +  30\" 3x 4/ 21   Bridges SL  W/ triple threat SB  15x  7/6                 TR @ gym    5/ 10 [employer]   Squats on airex  2 10 4/ 28   Wall slides for knee flexion  5\" 15           Leg press  ECC 0/90  #   110# 2  2 10x  15    7/6   HS curls  SL  40# 2 15x  8/31^ burnout sets      Leg ext 90/30 30# 2 15 8/31 ^burn out sests    Wall sits  w/BS   1min 3/4x  almost daily     Therapeutic Activities 10' t educated with RT gym with plan for strengthening, joint protection/ ROM limitations with leg extensions etc. 7/19 BIODEX results   Forward step ups with R LE flexion 6\" 1 15    Objective measures  6'   above   Sports cord circuit  4D fwd/ rev & side SLS w/ lateral stepping at end  15\" 4x 8/17   4 square jumping B 1-2,1-4,4-2 3-1,  cw/ ccw   Scissor lunge/  jumping      Small dist 10\"  ea 9/ 14  HEP   Broad jumps  B   SL hopping/ triple hop/ crossover/ 6m hop test    SL Sit/stand for 1 min   Hop for distance  6 M  15'  x10 9/14  practice   SLS cone pick ups from 8\" step 5 cones 1 2x    Armenian split squat off high low  2 10 With mild HHA   6\" step up/ hop down   12\"/ 18' /24\" step up B down     SL hop ups and SL hop downs on smaller steps  8/31   Combinations of SL and D   anual Intervention  NMR re-education 25'       LSD   4\" 2 10 HEP    Walk/ jog  2.5 - 3.5 mph  W/ brace  9/ 14   SLS w/  3 ball toss  airex 10x in row   5/10   Wall jumps   Tucks   15\" 3x  x15 9/ proprioception of core, proximal hip and LE for self care, mobility, lifting, and ambulation/stair navigation      Manual Treatments:  PROM / STM / Oscillations-Mobs:  G-I, II, III, IV (PA's, Inf., Post.)  [x] (73200) Provided manual therapy to mobilize LE, proximal hip and/or LS spine soft tissue/joints for the purpose of modulating pain, promoting relaxation,  increasing ROM, reducing/eliminating soft tissue swelling/inflammation/restriction, improving soft tissue extensibility and allowing for proper ROM for normal function with self care, mobility, lifting and ambulation. 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 Independent in HEP and progression per patient tolerance, in order to prevent re-injury. [] Progressing: [x] Met: [] Not Met: [] Adjusted  2. Patient will have a decrease in pain to facilitate improvement in movement, function, and ADLs as indicated by Functional Deficits. [] Progressing: [x] Met: [] Not Met: [] Adjusted     Long Term Goals: To be achieved in: 8 weeks  1. Disability index score of 25% or less for the LEFS to assist with reaching prior level of function. [x] Progressing: [] Met: [] Not Met: [] Adjusted  2. Patient will demonstrate increased AROM to WNL to allow for proper joint functioning as indicated by patients Functional Deficits. [] Progressing: [x] Met: [] Not Met: [] Adjusted  3. Patient will demonstrate an increase in Strength to good proximal hip strength and control, within 5lb HHD in LE to allow for proper functional mobility as indicated by patients Functional Deficits. [x] Progressing: [] Met: [] Not Met: [] Adjusted  4. Patient will return to 15+ minutes of ambulation without increased symptoms or restriction to return to PLOF. [] Progressing: [x] Met: [] Not Met: [] Adjusted  5.  Patient will tolerate 25+ minutes of sitting without increased symptoms or restriction to return to PLOF. [] Progressing: [x] Met: [] Not Met: [] Adjusted       Progression Towards Functional goals:  [x] Patient is progressing as expected towards functional goals listed. [] Progression is slowed due to complexities listed. [] Progression has been slowed due to co-morbidities. [] Plan just implemented, too soon to assess goals progression  [] Other:       Treatment/Activity Tolerance:  [x] Patient tolerated treatment well [] Patient limited by fatique  [] Patient limited by pain  [] Patient limited by other medical complications  [x] Other: Pt tolerated exercise progressions well today w/ jumping including B broad jumps,  SL jumps/ triple hops/ crossover jumps today to prepare for functional testing. Box jumps off various heights with step ups and hop downs from B to SL landings. Pt initiated session today with walk/ jog  and denied pain but had some anxious moments on TM. Denied pain with tendon and jumping etc today. Improved eccentric quad strength and control noted with progressions, but pt was very fatigued at end of session. Good overall tolerance to walk/ jog and plyometrics today. Improved mobility with jumping mechanics and fluidity of movement. Overall Progression Towards Functional goals/ Treatment Progress Update:  [x] Patient is progressing as expected towards functional goals listed. [] Progression is slowed due to complexities/Impairments listed. [] Progression has been slowed due to co-morbidities. [] Plan just implemented, too soon to assess goals progression <30days   [] Goals require adjustment due to lack of progress  [] Patient is not progressing as expected and requires additional follow up with physician  [] Other    Prognosis for POC: [x] Good [] Fair  [] Poor    Patient requires continued skilled intervention: [x] Yes  [] No        PLAN:  Biodex on Sept 27 and [place] Sept 9/29 .  Advised to start walk/ jogging up to 15 minutes at least 2x/ week. [x] Continue per plan of care [] Alter current plan (see comments)  [] Plan of care initiated [] Hold pending MD visit [] Discharge    Electronically signed by: ROSSI Walden, PT, DPT      Note: If patient does not return for scheduled/recommended follow up visits, this note will serve as a discharge from care along with the most recent update on progress.

## 2022-09-27 ENCOUNTER — TELEPHONE (OUTPATIENT)
Dept: ORTHOPEDIC SURGERY | Age: 32
End: 2022-09-27

## 2022-09-27 ENCOUNTER — HOSPITAL ENCOUNTER (OUTPATIENT)
Dept: PHYSICAL THERAPY | Age: 32
Setting detail: THERAPIES SERIES
Discharge: HOME | End: 2022-09-27
Payer: COMMERCIAL

## 2022-09-27 PROCEDURE — 97750 PHYSICAL PERFORMANCE TEST: CPT | Performed by: SPECIALIST/TECHNOLOGIST

## 2022-09-27 NOTE — PROGRESS NOTES
[facility]    Phone: [phone]   Fax: [phone]    Isokinetic Strength Testing Results Summary  Kneeatient Name:  Angus Guillen    :  1990  MRN: [id number]  Restrictions/Precautions:   Medical/Treatment Diagnosis Information:    S/P ACL /LME Left  3/73/43     Insurance/Certification information:     Physician Information:     Pain level: 0/10    Latex Allergy:  [x]NO      []YES  Preferred Language for Healthcare:   [x]English       []other:    Visit # Insurance Allowable Auth required? Date Range   29  3 GAP 30  8/17, [x]  Yes  []  No      Pain level:  0/10     SUBJECTIVE:   7months s/p   Rolly Ramos  Pt reports that his knee has been sore recently and has been active with other things. Walked on sat @ [facility] and noticed walking prolonged period of time and noticed being sore. Is doing walk/ jog 3-4x week. 1 ' walk/ ' jog alt cycles for about  1 hour/ hour half and admits to not running/ jogging. Working out at gym regularly and only has intermittent patellar Left tendon discomfort  OBJECTIVE:  Observation:   Test measurements:    3/15: steristrips intact, no drainage or overt s/sx of infection  3/28/22: L knee ext AROM = lacking 5 at beginning of session, 0 with PROM/OP; L knee flex AAROM = 113  22: L knee flex AAROM = 124 (w/wall slides)  22: L knee ext AROM = lacking 1 at rest, 0 with QS; flex AAROM = 135 (w/wall slides)  LEFS = 46/80 = 42% disability   Less visible distal patellar swelling, fat pad swelling today compared to  session. Improved ambulation with no antalgia, has B varus alignment  5/10 Pt has mild TTP below the patella/ infrapatellar fat pad (hoffas fat pad)     MMT = R knee flexion and extension = 5/5 . MMT L knee flexion and extension = 5-/5     LEFS = 47/80 = 41% disability. Mid patella circumferencal measurements R = 39.25 cm.  L = 41.0 cm.   22:  ROM LEFT RIGHT   Knee ext 0 0 Knee Flex 140 141   Strength (measured in lbs using hand held dynamometer) LEFT RIGHT   HIP Flexors  67.4 76.2   HIP Abductors NT today due to issues with dynamometer NT today due to issues with dynamometer   Knee EXT (quad) 67.0 54.9   Knee Flex (HS) NT today due to issues with dynamometer NT today due to issues with dynamometer     7/19/22 LEFS  53/80    RESTRICTIONS/PRECAUTIONS: s/p L ACLR w/ quad tendon allograft and partial lateral meniscectomy (2/25/22), hx of L ACLR (10 years ago)    Exercises/Interventions:    Therapeutic Ex 25' Resistance Sets/sec Reps Notes   Elliptical/ BIKE 3' fwd/ bwd 6' 9/ 27   Incline calf stretch  Hamstring stretch EOB  Standing quad stretch  30\" 3x ea 9/27   MH ABD/HIP flex  B 75# 3 10x  7/6                 SLR +  30\" 3x    Bridges SL  W/ triple threat SB  15x  7/6                        Squats on airex  2 10 4/ 28   Wall slides for knee flexion  5\" 15           Leg press  ECC 0/90  #   110# 2  2 10x  15    7/6   HS curls  SL  40# 2 15x  8/31^ burnout sets      Leg ext 90/30 30# 2 15 8/31 ^burn out sests    Wall sits  w/BS   1min 3/4x  almost daily     Therapeutic Activities 10' t educated with RT gym with plan for strengthening, joint protection/ ROM limitations with leg extensions etc. 9/27   Forward step ups with R LE flexion 6\" 1 15    Objective measures  10'   9/27 biodex & functional testing   Sports cord circuit  4D fwd/ rev & side SLS w/ lateral stepping at end  15\" 4x 8/17   4 square jumping B 1-2,1-4,4-2 3-1,  cw/ ccw   Scissor lunge/  jumping      Small dist 10\"  ea 9/ 14  HEP   Broad jumps  B   SL hopping/ triple hop/ crossover/ 6m hop test    SL Sit/stand for 1 min   Hop for distance  6 M  15'  x10 9/14  practice   SLS cone pick ups from 8\" step 5 cones 1 2x    Yi split squat off high low  2 10 With mild HHA   6\" step up/ hop down   12\"/ 18' /24\" step up B down     SL hop ups and SL hop downs on smaller steps  8/31   Combinations of SL and D   anual Intervention  NMR re-education 25'       LSD   4\" 2 10 HEP    Walk/ jog  2.5 - 3.5 mph  W/ brace  9/ 14   SLS w/  3 ball toss  airex 10x in row   5/10   Wall jumps   Tucks   15\" 3x  x15 9/ 14   Lateral stepping  Monster walks Wine + blue 1 3 laps 7/6   RDL's w/ 10#KB  2 10 7/6    SIT/STAND Left only 23\" from floor  2 15x 8/17   Retro and lateral slider lunges 10# KB 1 15x 5/ 10    Bosu lunges fwd  1 15x 5/ 10   Ladder drills 1/2 speed 5'   Lateral, sideways, forward, backward  9/14   6\"/  12/ 18\"   B/ SL  Aerex SLS  5x  9/14   3/4 squats with TRX bands   1 10      TRX with SL squat to chair + aerex for balance  1 15x 6/8   Quarter squats with TRX bands   1 10     Squats w/ landmine    Deadlift from floor  Bar + 20# 2 10xea 8/17            Objective:  Isokinetic Test Results:  9/27/22  Bilateral Difference:  Quadricep 180 deg/sec: 10.2% [x] Deficit   [] Surplus 300 deg/sec: 26.4% [x] Deficit   [] Surplus   Hamstring 180 deg/sec: 20% [x] Deficit   [] Surplus 300 deg/sec: 23.7% [x] Deficit   [] Surplus     Normative Data, 180 degrees/second:  Quadricep Normal:  60% Patient: 52.6%   Hamstring Normal:  45% Patient: 39%     Normative Data, 300 degrees/second:  Quadricep Normal: 50%   Patient: 32.6%   Hamstring Normal: 40% Patient: 25.9%       FUNCTIONAL TESTING      Y balance Test Left Right Deficit   Fwd 72 63 13%   Retro Lateral 106 108 2%   Retro cross-behind 120 122 2%         6 m timed hop 2\" 1:81    Single Leg Squat testing 1 min.   Left Right  Deficit    18 22 18%         Single Leg Hop for Distance  Left  Right Deficit    1 m 50\" 1 m 60\"                Triple Hop for distance  Left Right Deficit    4 m 22\" 5m 35\"    Crossover hops  4 m 8\" 4 m 98\"    9/27/ 22 [place]leton scale of Kinesiophobia        36 final score     EXERCISES                                               Goals:      Charges:  Timed Code Treatment Minutes: 60   Total Treatment Minutes: 60     [] EVAL  [] AR(43818) x   [] IONTO  [] NMR (15880) x     [] VASO  [] Manual (26060) x       [x] Other: Physical Performance Test (02954) x   4  [] TA x      [] Mech Traction (60789)  [] ES(attended) (26672)      [] ES (un) (32404): The findings of this test would result with the following summary and recommendations:  Pt tolerated isokinetic testing well. Deficits throughout compared to uninvolved and expected ranges. Return to Play:    [x]  Stage 1: Intro to Strength   [x]  Stage 2: progress strength but no active jogging yet   []  Stage 3: Return to Jump and Strength   []  Stage 4: Dynamic Strength and Agility   []  Stage 5: Sport Specific Training     []  Ready to Return to Play, Meets All Above Stages   [x]  Not Ready for Return to Sports   Comments:         Sincerely,  You Mail, PTA/ ATC      9/27/2022                   Therapeutic Exercise and NMR EXR  [x] [phone]) Provided verbal/tactile cueing for activities related to strengthening, flexibility, endurance, ROM for improvements in LE, proximal hip, and core control with self care, mobility, lifting, ambulation.  [] (80452) Provided verbal/tactile cueing for activities related to improving balance, coordination, kinesthetic sense, posture, motor skill, proprioception  to assist with LE, proximal hip, and core control in self care, mobility, lifting, ambulation and eccentric single leg control.      NMR and Therapeutic Activities:    [] (17754 or 05929) Provided verbal/tactile cueing for activities related to improving balance, coordination, kinesthetic sense, posture, motor skill, proprioception and motor activation to allow for proper function of core, proximal hip and LE with self care and ADLs  [] (53124) Gait Re-education- Provided training and instruction to the patient for proper LE, core and proximal hip recruitment and positioning and eccentric body weight control with ambulation re-education including up and down stairs     Home Exercise Program:  see above for suggestions for GYM program 7/19  [x] (25214) Reviewed/Progressed HEP activities related to strengthening, flexibility, endurance, ROM of core, proximal hip and LE for functional self-care, mobility, lifting and ambulation/stair navigation   [] (42016)Reviewed/Progressed HEP activities related to improving balance, coordination, kinesthetic sense, posture, motor skill, proprioception of core, proximal hip and LE for self care, mobility, lifting, and ambulation/stair navigation      Manual Treatments:  PROM / STM / Oscillations-Mobs:  G-I, II, III, IV (PA's, Inf., Post.)  [x] (95739) Provided manual therapy to mobilize LE, proximal hip and/or LS spine soft tissue/joints for the purpose of modulating pain, promoting relaxation,  increasing ROM, reducing/eliminating soft tissue swelling/inflammation/restriction, improving soft tissue extensibility and allowing for proper ROM for normal function with self care, mobility, lifting and ambulation. 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 Independent in HEP and progression per patient tolerance, in order to prevent re-injury. [] Progressing: [x] Met: [] Not Met: [] Adjusted  2. Patient will have a decrease in pain to facilitate improvement in movement, function, and ADLs as indicated by Functional Deficits. [] Progressing: [x] Met: [] Not Met: [] Adjusted     Long Term Goals: To be achieved in: 8 weeks  1. Disability index score of 25% or less for the LEFS to assist with reaching prior level of function. [x] Progressing: [] Met: [] Not Met: [] Adjusted  2. Patient will demonstrate increased AROM to WNL to allow for proper joint functioning as indicated by patients Functional Deficits. [] Progressing: [x] Met: [] Not Met: [] Adjusted  3.  Patient will demonstrate an increase in Strength to good proximal hip strength and control, within 5lb HHD in LE to allow for proper functional mobility as indicated by patients Functional Deficits. [x] Progressing: [] Met: [] Not Met: [] Adjusted  4. Patient will return to 15+ minutes of ambulation without increased symptoms or restriction to return to PLOF. [] Progressing: [x] Met: [] Not Met: [] Adjusted  5. Patient will tolerate 25+ minutes of sitting without increased symptoms or restriction to return to PLOF. [] Progressing: [x] Met: [] Not Met: [] Adjusted       Progression Towards Functional goals:  [x] Patient is progressing as expected towards functional goals listed. [] Progression is slowed due to complexities listed. [] Progression has been slowed due to co-morbidities. [] Plan just implemented, too soon to assess goals progression  [] Other:       Treatment/Activity Tolerance:  [x] Patient tolerated treatment well [] Patient limited by fatique  [] Patient limited by pain  [] Patient limited by other medical complications  [x] Other:  Biodex and functional testing today performed with less deficits compared to prior session on   July 19, 2022 . Pt making good gains with quad and hamstring power  but is challenged with endurance pushing into 15 reps with his quad and hamstring ratios. Pt reported slight patellar tendon discomfort with Y balance with forward left SLS balance. Overall Progression Towards Functional goals/ Treatment Progress Update:  [x] Patient is progressing as expected towards functional goals listed. [] Progression is slowed due to complexities/Impairments listed. [] Progression has been slowed due to co-morbidities.   [] Plan just implemented, too soon to assess goals progression <30days   [] Goals require adjustment due to lack of progress  [] Patient is not progressing as expected and requires additional follow up with physician  [] Other    Prognosis for POC: [x] Good [] Fair  [] Poor    Patient requires continued skilled intervention: [x] Yes  [] No PLAN:   Romana Lively Sept 9/29 . Advised to start walk/ jogging up to 15 minutes+ at least 2x/ week. Advised to monitor duration of walk/ jog with pushing cardio vs strength training. Advised to schedule in 2 weeks for followup Gap session. Push strength as able with endurance with quads/hamstring with higher reps with walk/ jog etc. Has 1 more approved visit for insurance but will continue with G[employer]. Brace to be ordered but custom was denied. [x] Continue per plan of care [] Alter current plan (see comments)  [] Plan of care initiated [] Hold pending MD visit [] Discharge    Electronically signed by: Jolene Carrington [id number], ROSSI Byrd, PT, DPT      Note: If patient does not return for scheduled/recommended follow up visits, this note will serve as a discharge from care along with the most recent update on progress.

## 2022-09-27 NOTE — TELEPHONE ENCOUNTER
Informed patient that custom knee brace has been denied. Dr. Dyana Mitchell will do a peer to peer to see if it can be approved. I will contact patient once we know if peer to peer gets brace approved. Patient is bringing OTS brace in to his MD appointment and I will look at fit.

## 2022-09-27 NOTE — TELEPHONE ENCOUNTER
General Question     Subject: KNEE BRACE  Patient and /or Facility Request: Trung Meier  Contact Number: [phone]    PATIENT CALLED IN TO TALK TO SOMEONE ABOUT GETTING A BRACE. Please call patient back at the above number. ..

## 2022-09-29 ENCOUNTER — OFFICE VISIT (OUTPATIENT)
Dept: ORTHOPEDIC SURGERY | Age: 32
End: 2022-09-29
Payer: COMMERCIAL

## 2022-09-29 ENCOUNTER — TELEPHONE (OUTPATIENT)
Dept: ORTHOPEDIC SURGERY | Age: 32
End: 2022-09-29

## 2022-09-29 VITALS — HEIGHT: 68 IN | WEIGHT: 232 LBS | BODY MASS INDEX: 35.16 KG/M2

## 2022-09-29 DIAGNOSIS — S83.512D RUPTURE OF ANTERIOR CRUCIATE LIGAMENT OF LEFT KNEE, SUBSEQUENT ENCOUNTER: Primary | ICD-10-CM

## 2022-09-29 PROCEDURE — 1036F TOBACCO NON-USER: CPT | Performed by: ORTHOPAEDIC SURGERY

## 2022-09-29 PROCEDURE — G8417 CALC BMI ABV UP PARAM F/U: HCPCS | Performed by: ORTHOPAEDIC SURGERY

## 2022-09-29 PROCEDURE — G8427 DOCREV CUR MEDS BY ELIG CLIN: HCPCS | Performed by: ORTHOPAEDIC SURGERY

## 2022-09-29 PROCEDURE — 99213 OFFICE O/P EST LOW 20 MIN: CPT | Performed by: ORTHOPAEDIC SURGERY

## 2022-09-29 NOTE — PROGRESS NOTES
History:  Kathleen Luna is here for left knee follow up. He had left knee arthroscopic surgery on 2/26/22. Findings at surgery: Left knee anterior cruciate ligament tear and lateral meniscus tear. The patient's pain is rated at 2/10. Doing PT at the [facility] office. He still complains of anterior knee pain. He had requested a functional brace. 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 His insurance denied a custom brace. The off-the-shelf brace is digging into his medial knee and causing skin ulceration/blister. Physical Examination:  Ht 5' 8\" (1.727 m)   Wt 232 lb (105.2 kg)   BMI 35.28 kg/m²    Patient is awake, alert, and in no acute distress. No effusion  Left knee ROM 0-120  Negative Lachman test.  Mild quad atrophy. No significant quad inhibition. Isokinetic Test Results:  9/27/22  Bilateral Difference:  Quadricep 180 deg/sec: 10.2% [x] Deficit   [] Surplus 300 deg/sec: 26.4% [x] Deficit   [] Surplus   Hamstring 180 deg/sec: 20% [x] Deficit   [] Surplus 300 deg/sec: 23.7% [x] Deficit   [] Surplus      Normative Data, 180 degrees/second:  Quadricep Normal:  60% Patient: 52.6%   Hamstring Normal:  45% Patient: 39%      Normative Data, 300 degrees/second:  Quadricep Normal: 50%   Patient: 32.6%   Hamstring Normal: 40% Patient: 25.9%     FUNCTIONAL TESTING         Y balance Test Left Right Deficit   Fwd 72 63 13%   Retro Lateral 106 108 2%   Retro cross-behind 120 122 2%             6 m timed hop 2\" 1:81     Single Leg Squat testing 1 min. Left Right  Deficit     18 22 18%             Single Leg Hop for Distance  Left  Right Deficit     1 m 50\" 1 m 60\"                         Triple Hop for distance  Left Right Deficit     4 m 22\" 5m 35\"     Crossover hops  4 m 8\" 4 m 98\"           Assessment:   7 months status post left knee arthroscopy, anterior cruciate ligament reconstruction with quadriceps tendon allograft and partial lateral meniscectomy.       Plan:   Andrea Zaidi

## 2022-09-29 NOTE — TELEPHONE ENCOUNTER
Patient having issues with OTS fusion knee brace. OTS bracing is causing discomfort in knee, especially when doing functional activities. Patient returned OTS brace. Brace charge removed from account. Custom brace was denied, but MD is scheduling a peer to peer. Will contact patient once peer to peer is complete.

## 2022-10-18 DIAGNOSIS — S83.512D RUPTURE OF ANTERIOR CRUCIATE LIGAMENT OF LEFT KNEE, SUBSEQUENT ENCOUNTER: ICD-10-CM

## 2022-10-18 PROCEDURE — L1846 KO W ADJ FLEX/EXT ROTAT MOLD: HCPCS | Performed by: ORTHOPAEDIC SURGERY

## 2022-10-19 ENCOUNTER — HOSPITAL ENCOUNTER (OUTPATIENT)
Dept: PHYSICAL THERAPY | Age: 32
Setting detail: THERAPIES SERIES
Discharge: HOME | End: 2022-10-19
Payer: COMMERCIAL

## 2022-10-19 PROCEDURE — 9990000027 HC GAP GROUP: Performed by: SPECIALIST/TECHNOLOGIST

## 2022-10-19 NOTE — PROGRESS NOTES
Geovani Samantha    Phone: [phone]   Fax: [phone]    Isokinetic Strength Testing Results Summary  Kneeatient Name:  Karan Sanderson    :  1990  MRN: [id number]  Restrictions/Precautions:   Medical/Treatment Diagnosis Information:    S/P ACL /LME Left       Insurance/Certification information:     Physician Information:     Pain level: 0/10    Latex Allergy:  [x]NO      []YES  Preferred Language for Healthcare:   [x]English       []other:    Visit # Insurance Allowable Auth required? Date Range   29   30  , [x]  Yes  []  No      Pain level:  0/10     SUBJECTIVE:   7months s/p   pt workouts are doing well, and doing strength workouts and jogging with no issues. Is jogging/ walking 5 min on/ off. Walking with make his quad sore at the [facility] but denies any knee pain. This was a hilly walk. Received new brace yesterday. Wore the old brace during the 4 mile walk. OBJECTIVE:  Observation:   Test measurements:    3/15: steristrips intact, no drainage or overt s/sx of infection  3/28/22: L knee ext AROM = lacking 5 at beginning of session, 0 with PROM/OP; L knee flex AAROM = 113  22: L knee flex AAROM = 124 (w/wall slides)  22: L knee ext AROM = lacking 1 at rest, 0 with QS; flex AAROM = 135 (w/wall slides)  LEFS = 46/80 = 42% disability   Less visible distal patellar swelling, fat pad swelling today compared to  session. Improved ambulation with no antalgia, has B varus alignment  5/10 Pt has mild TTP below the patella/ infrapatellar fat pad (hoffas fat pad)     MMT = R knee flexion and extension = 5/5 . MMT L knee flexion and extension = 5-/5     LEFS = 47/80 = 41% disability. Mid patella circumferencal measurements R = 39.25 cm.  L = 41.0 cm.   22:  ROM LEFT RIGHT   Knee ext 0 0   Knee Flex 140 141   Strength (measured in lbs using hand held dynamometer) LEFT RIGHT   HIP Flexors  67.4 76.2 HIP Abductors NT today due to issues with dynamometer NT today due to issues with dynamometer   Knee EXT (quad) 67.0 54.9   Knee Flex (HS) NT today due to issues with dynamometer NT today due to issues with dynamometer     7/19/22 LEFS  53/80    RESTRICTIONS/PRECAUTIONS: s/p L ACLR w/ quad tendon allograft and partial lateral meniscectomy (2/25/22), hx of L ACLR (10 years ago)    Exercises/Interventions:    Therapeutic Ex 25' Resistance Sets/sec Reps Notes   Elliptical/ BIKE 3' fwd/ bwd 6'    10/19   Incline calf stretch  Hamstring stretch EOB  Standing quad stretch  30\" 3x ea  10/19   MH ABD/HIP flex  B 75# 3 10x  7/6                 SLR +  30\" 3x    Bridges SL  W/ triple threat SB  15x  7/6                        Squats on airex  2 10 4/ 28   Wall slides for knee flexion  5\" 15           Leg press  ECC 0/90  #   110# 2  2 10x  15    7/6   HS curls  SL  40# 2 15x  8/31^ burnout sets      Leg ext 90/30 30# 2 15 8/31 ^burn out sests    Wall sits  w/BS   1min 3/4x  almost daily     Therapeutic Activities 10' t educated with RT gym with plan for strengthening, joint protection/ ROM limitations with leg extensions etc. 9/27   Forward step ups with R LE flexion 6\" 1 15    Objective measures  10' 9/27 biodex & functional testing   Sports cord circuit  4D fwd/ rev & side SLS w/ lateral stepping at end  15\" 4x 8/17   4 square jumping B 1-2,1-4,4-2 3-1,  cw/ ccw   Scissor lunge/  jumping      Small dist 10\"  ea 9/ 14  HEP   Broad jumps  B   SL hopping/ triple hop/ crossover/ 6m hop test    SL Sit/stand for 1 min   Hop for distance  6 M  15'  x10 9/14  practice   SLS cone pick ups from 8\" step 5 cones 1 2x    Icelandic split squat off high low  2 10 With mild HHA   6\" step up/ hop down   12\"/ 18' /24\" step up B down     SL hop ups and SL hop downs on smaller steps  8/31   Combinations of SL and D   anual Intervention  NMR re-education 25'       LSD   4\" 2 10 HEP    Walk/ jog  2.5 - 3.5 mph  W/ brace  9/ 14   SLS w/ 3 ball toss  airex 10x in row   5/10   Wall jumps   Tucks   15\" 3x  x15 9/ 14   Lateral stepping  Monster walks Wine + blue 1 3 laps 7/6   RDL's w/ 10#KB  2 10 7/6    SIT/STAND Left only 23\" from floor  2 15x 8/17   Retro and lateral slider lunges 10# KB 1 15x 5/ 10    Bosu lunges fwd  1 15x 5/ 10   Ladder drills 1/2 speed 5'   Lateral, sideways, forward, backward  9/14   6\"/ 12/ 18\"   B/ SL  Aerex SLS  5x  9/14   3/4 squats with TRX bands   1 10      TRX with SL squat to chair + aerex for balance  1 15x 6/8   Quarter squats with TRX bands   1 10     Squats w/ landmine    Deadlift from floor  Bar + 20# 2 10xea 8/17            Objective:  Isokinetic Test Results:  9/27/22  Bilateral Difference:  Quadricep 180 deg/sec: 10.2% [x] Deficit   [] Surplus 300 deg/sec: 26.4% [x] Deficit   [] Surplus   Hamstring 180 deg/sec: 20% [x] Deficit   [] Surplus 300 deg/sec: 23.7% [x] Deficit   [] Surplus     Normative Data, 180 degrees/second:  Quadricep Normal:  60% Patient: 52.6%   Hamstring Normal:  45% Patient: 39%     Normative Data, 300 degrees/second:  Quadricep Normal: 50%   Patient: 32.6%   Hamstring Normal: 40% Patient: 25.9%       FUNCTIONAL TESTING      Y balance Test Left Right Deficit   Fwd 72 63 13%   Retro Lateral 106 108 2%   Retro cross-behind 120 122 2%         6 m timed hop 2\" 1:81    Single Leg Squat testing 1 min.   Left Right  Deficit    18 22 18%         Single Leg Hop for Distance  Left  Right Deficit    1 m 50\" 1 m 60\"                Triple Hop for distance  Left Right Deficit    4 m 22\" 5m 35\"    Crossover hops  4 m 8\" 4 m 98\"    9/27/ 22 Mifflin scale of Kinesiophobia        36 final score     EXERCISES   Plyometrics   6\" 2up/ 1 down 12\" + 18\"B up/down   x8   6\" SL/ SL 5x  10/19   Landmine+ 45# X1 5 B squat and lunge 10/19                                     Goals:      Charges:  Timed Code Treatment Minutes: 60   Total Treatment Minutes: 60     [] EVAL  [] OS(62685) x   [] IONTO  [] NMR (37021) x     [] VASO  [] Manual (63536) x       [] Other: Physical Performance Test (51189) x   4  [] TA x      [] Mech Traction (26448)  [] ES(attended) (02805)      [] ES (un) (71141): The findings of this test would result with the following summary and recommendations:  Pt tolerated isokinetic testing well. Deficits throughout compared to uninvolved and expected ranges. Return to Play:    [x]  Stage 1: Intro to Strength   [x]  Stage 2: progress strength but no active jogging yet   []  Stage 3: Return to Jump and Strength   []  Stage 4: Dynamic Strength and Agility   []  Stage 5: Sport Specific Training     []  Ready to Return to Play, Meets All Above Stages   [x]  Not Ready for Return to Sports   Comments:         Sincerely,  Mariana Ramesh, PTA/ ATC      10/19/2022                   Therapeutic Exercise and NMR EXR  [x] ([id number]) Provided verbal/tactile cueing for activities related to strengthening, flexibility, endurance, ROM for improvements in LE, proximal hip, and core control with self care, mobility, lifting, ambulation.  [] (21056) Provided verbal/tactile cueing for activities related to improving balance, coordination, kinesthetic sense, posture, motor skill, proprioception  to assist with LE, proximal hip, and core control in self care, mobility, lifting, ambulation and eccentric single leg control.      NMR and Therapeutic Activities:    [] (46227 or 45565) Provided verbal/tactile cueing for activities related to improving balance, coordination, kinesthetic sense, posture, motor skill, proprioception and motor activation to allow for proper function of core, proximal hip and LE with self care and ADLs  [] (58493) Gait Re-education- Provided training and instruction to the patient for proper LE, core and proximal hip recruitment and positioning and eccentric body weight control with ambulation re-education including up and down stairs     Home Exercise Program:  see above for suggestions for GYM program 7/19  [x] (61169) Reviewed/Progressed HEP activities related to strengthening, flexibility, endurance, ROM of core, proximal hip and LE for functional self-care, mobility, lifting and ambulation/stair navigation   [] (26670)Reviewed/Progressed HEP activities related to improving balance, coordination, kinesthetic sense, posture, motor skill, proprioception of core, proximal hip and LE for self care, mobility, lifting, and ambulation/stair navigation      Manual Treatments:  PROM / STM / Oscillations-Mobs:  G-I, II, III, IV (PA's, Inf., Post.)  [x] (81319) Provided manual therapy to mobilize LE, proximal hip and/or LS spine soft tissue/joints for the purpose of modulating pain, promoting relaxation,  increasing ROM, reducing/eliminating soft tissue swelling/inflammation/restriction, improving soft tissue extensibility and allowing for proper ROM for normal function with self care, mobility, lifting and ambulation. 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 Independent in HEP and progression per patient tolerance, in order to prevent re-injury. [] Progressing: [x] Met: [] Not Met: [] Adjusted  2. Patient will have a decrease in pain to facilitate improvement in movement, function, and ADLs as indicated by Functional Deficits. [] Progressing: [x] Met: [] Not Met: [] Adjusted     Long Term Goals: To be achieved in: 8 weeks  1. Disability index score of 25% or less for the LEFS to assist with reaching prior level of function. [x] Progressing: [] Met: [] Not Met: [] Adjusted  2. Patient will demonstrate increased AROM to WNL to allow for proper joint functioning as indicated by patients Functional Deficits. [] Progressing: [x] Met: [] Not Met: [] Adjusted  3.  Patient will demonstrate an increase in Strength to good proximal hip strength and control, within 5lb HHD in LE to allow for proper functional mobility as indicated by patients Functional Deficits. [x] Progressing: [] Met: [] Not Met: [] Adjusted  4. Patient will return to 15+ minutes of ambulation without increased symptoms or restriction to return to PLOF. [] Progressing: [x] Met: [] Not Met: [] Adjusted  5. Patient will tolerate 25+ minutes of sitting without increased symptoms or restriction to return to PLOF. [] Progressing: [x] Met: [] Not Met: [] Adjusted       Progression Towards Functional goals:  [x] Patient is progressing as expected towards functional goals listed. [] Progression is slowed due to complexities listed. [] Progression has been slowed due to co-morbidities. [] Plan just implemented, too soon to assess goals progression  [] Other:       Treatment/Activity Tolerance:  [x] Patient tolerated treatment well [] Patient limited by fatique  [] Patient limited by pain  [] Patient limited by other medical complications  [x] Other:  Biodex and functional testing today performed with less deficits compared to prior session on   July 19, 2022 . Pt making good gains with quad and hamstring power but is challenged with endurance pushing into 15 reps with his quad and hamstring ratios. Pt reported slight patellar tendon discomfort with Y balance with forward left SLS balance. Had 1 brief moment with SL hops coming down of 12\" step with a pop and pressure today but was able to keep working. Overall Progression Towards Functional goals/ Treatment Progress Update:  [x] Patient is progressing as expected towards functional goals listed. [] Progression is slowed due to complexities/Impairments listed. [] Progression has been slowed due to co-morbidities.   [] Plan just implemented, too soon to assess goals progression <30days   [] Goals require adjustment due to lack of progress  [] Patient is not progressing as expected and requires additional follow up with physician  [] Other    Prognosis for POC: [x] Good [] Fair  [] Poor    Patient requires continued skilled intervention: [x] Yes  [] No        PLAN:    Advised to continue w/  walk/ jogging up to 15 minutes+ at least 2x/ week. Plyometrics 2x week and only 1x week with pushing functional testing 1x/ week. Advised to monitor duration of walk/ jog with pushing cardio vs strength training. Advised to schedule in 3 weeks for followup Gap session. Push strength as able with endurance with quads/hamstring with higher reps with walk/ jog etc.  Begin pyramid setting with strengthening. Has 1 more approved visit for insurance but will continue with [employer]. Brace to be ordered but custom was denied. Recommended schedule with Laurel Wilson for [facility] w/ biodex/ functional testing. [x] Continue per plan of care [] Alter current plan (see comments)  [] Plan of care initiated [] Hold pending MD visit [] Discharge    Electronically signed by: Mitch Dakin [id number], ROSSI Caro, PT, DPT      Note: If patient does not return for scheduled/recommended follow up visits, this note will serve as a discharge from care along with the most recent update on progress.

## 2022-10-20 ENCOUNTER — TELEPHONE (OUTPATIENT)
Dept: ORTHOPEDIC SURGERY | Age: 32
End: 2022-10-20

## 2022-11-09 ENCOUNTER — HOSPITAL ENCOUNTER (OUTPATIENT)
Dept: PHYSICAL THERAPY | Age: 32
Discharge: HOME | End: 2022-11-09
Payer: COMMERCIAL

## 2022-11-09 PROCEDURE — 9990000027 HC GAP GROUP: Performed by: SPECIALIST/TECHNOLOGIST

## 2022-11-09 NOTE — PROGRESS NOTES
Geovani Samantha    Phone: [phone]   Fax: [phone]    Isokinetic Strength Testing Results Summary  Kneeatient Name:  Norm Carrington    :  1990  MRN: [id number]  Restrictions/Precautions:   Medical/Treatment Diagnosis Information:    S/P ACL /LME Left       Insurance/Certification information:     Physician Information:     Pain level: 0/10    Latex Allergy:  [x]NO      []YES  Preferred Language for Healthcare:   [x]English       []other:    Visit # Insurance Allowable Auth required? Date Range    GAP 30   [x]  Yes  []  No      Pain level:  0/10     SUBJECTIVE:   8+ months s/p   Pt reports having good workouts and his knee is doing much better. Less pain with running etc on tm. Admits to running on TM up to 10 mph but is still walking on TM. OBJECTIVE:  Observation:   Test measurements:    3/15: steristrips intact, no drainage or overt s/sx of infection  3/28/22: L knee ext AROM = lacking 5 at beginning of session, 0 with PROM/OP; L knee flex AAROM = 113  22: L knee flex AAROM = 124 (w/wall slides)  22: L knee ext AROM = lacking 1 at rest, 0 with QS; flex AAROM = 135 (w/wall slides)  LEFS = 46/80 = 42% disability   Less visible distal patellar swelling, fat pad swelling today compared to  session. Improved ambulation with no antalgia, has B varus alignment  5/10 Pt has mild TTP below the patella/ infrapatellar fat pad (hoffas fat pad)     MMT = R knee flexion and extension = 5/5 . MMT L knee flexion and extension = 5-/5     LEFS = 47/80 = 41% disability. Mid patella circumferencal measurements R = 39.25 cm.  L = 41.0 cm.   22:  ROM LEFT RIGHT   Knee ext 0 0   Knee Flex 140 141   Strength (measured in lbs using hand held dynamometer) LEFT RIGHT   HIP Flexors  67.4 76.2   HIP Abductors NT today due to issues with dynamometer NT today due to issues with dynamometer   Knee EXT (quad) 67.0 54.9   Knee Flex (HS) NT today due to issues with dynamometer NT today due to issues with dynamometer     7/19/22 LEFS  53/80    RESTRICTIONS/PRECAUTIONS: s/p L ACLR w/ quad tendon allograft and partial lateral meniscectomy (2/25/22), hx of L ACLR (10 years ago)    Exercises/Interventions:    Therapeutic Ex 25' Resistance Sets/sec Reps Notes   Elliptical/ BIKE 3' fwd/ bwd 6'    10/19   Incline calf stretch  Hamstring stretch EOB  Standing quad stretch  30\" 3x ea  10/19   MH ABD/HIP flex  B 75# 3 10x  7/6                 SLR +  30\" 3x    Bridges SL  W/ triple threat SB  15x  7/6                        Squats on airex  2 10 4/ 28   Wall slides for knee flexion  5\" 15           Leg press  ECC 0/90  #   110# 2  2 10x  15    7/6   HS curls  SL  40# 2 15x  8/31^ burnout sets      Leg ext 90/30 30# 2 15 8/31 ^burn out sests    Wall sits  w/BS   1min 3/4x  almost daily     Therapeutic Activities 10' t educated with RT gym with plan for strengthening, joint protection/ ROM limitations with leg extensions etc. 9/27   Forward step ups with R LE flexion 6\" 1 15    Objective measures  10' 9/27 biodex & functional testing   Sports cord circuit  4D fwd/ rev & side SLS w/ lateral stepping at end  15\" 4x 8/17   4 square jumping B 1-2,1-4,4-2 3-1,  cw/ ccw   Scissor lunge/  jumping      Small dist 10\"  ea 9/ 14  HEP   Broad jumps  B   SL hopping/ triple hop/ crossover/ 6m hop test    SL Sit/stand for 1 min   Hop for distance  6 M  15'  x10  11/9   SLS cone pick ups from 8\" step 5 cones 1 2x    [facility] split squat off high low  2 10 With mild HHA   6\" step up/ hop down   12\"/ 18' /24\" step up B down     SL hop ups and SL hop downs on smaller steps  8/31   Combinations of SL and D   Dynamic warmup   ladders 11/9   FWD SL / lateral hopping left   Tuck jumps   x 12   180 x 5      4\" / 6\"  SL up/ SL down   Dup/ S dowm fwd to SLS/ plyo and take off explosion fwd & side X5  3 D plane11/9   NMR re-education 25'       LSD   4\" 2 10 HEP    Walk/ jog  2.5 - 3.5 mph  W/ brace  9/ 14   SLS w/ bosu pertubations  In multi planes 10x in row   11/9   Wall jumps   Tucks   15\" 3x  x15 9/ 14   Lateral stepping  Monster walks Wine + blue 1 3 laps 7/6   RDL's w/ 10#KB  2 10 7/6    SIT/STAND Left only 23\" from floor  2 15x 8/17   Retro and lateral slider lunges 10# KB 1 15x 5/ 10    Bosu lunges fwd  1 15x 5/ 10   Ladder drills 3/4 speed 5'   All planes 11/9   6\"/ 12/ 18\"   B/ SL  Aerex SLS  5x  9/14   3/4 squats with TRX bands   1 10      TRX with SL squat to chair + aerex for balance  1 15x 6/8   Quarter squats with TRX bands   1 10     Squats w/ landmine    Deadlift from floor  Bar + 20# 2 10xea 8/17            Objective:  Isokinetic Test Results:  9/27/22  Bilateral Difference:  Quadricep 180 deg/sec: 10.2% [x] Deficit   [] Surplus 300 deg/sec: 26.4% [x] Deficit   [] Surplus   Hamstring 180 deg/sec: 20% [x] Deficit   [] Surplus 300 deg/sec: 23.7% [x] Deficit   [] Surplus     Normative Data, 180 degrees/second:  Quadricep Normal:  60% Patient: 52.6%   Hamstring Normal:  45% Patient: 39%     Normative Data, 300 degrees/second:  Quadricep Normal: 50%   Patient: 32.6%   Hamstring Normal: 40% Patient: 25.9%       FUNCTIONAL TESTING      Y balance Test Left Right Deficit   Fwd 72 63 13%   Retro Lateral 106 108 2%   Retro cross-behind 120 122 2%         6 m timed hop 2\" 1:81    Single Leg Squat testing 1 min.   Left Right  Deficit    18 22 18%         Single Leg Hop for Distance  Left  Right Deficit    1 m 50\" 1 m 60\"                Triple Hop for distance  Left Right Deficit    4 m 22\" 5m 35\"    Crossover hops  4 m 8\" 4 m 98\"    9/27/ 22 [place] scale of Kinesiophobia        36 final score     EXERCISES   Plyometrics   6\" 2up/ 1 down 12\" + 18\"B up/down   x8   6\" SL/ SL 5x  10/19   Landmine+ 45# X1 5 B squat and lunge 10/19                                     Goals:      Charges:  Timed Code Treatment Minutes: 60   Total Treatment Minutes: 60     [] UVALDO  [] ADIEL(19648) x   [] IONTO  [] NMR (52889) x     [] VASO  [] Manual (62431) x       [] Other: Physical Performance Test (23601) x   4  [] TA x      [] Mech Traction (77255)  [] ES(attended) (79490)      [] ES (un) (78235): The findings of this test would result with the following summary and recommendations:  Pt tolerated isokinetic testing well. Deficits throughout compared to uninvolved and expected ranges. Return to Play:    [x]  Stage 1: Intro to Strength   [x]  Stage 2: progress strength but no active jogging yet   []  Stage 3: Return to Jump and Strength   []  Stage 4: Dynamic Strength and Agility   []  Stage 5: Sport Specific Training     []  Ready to Return to Play, Meets All Above Stages   [x]  Not Ready for Return to Sports   Comments:         Sincerely,  Radha Broderick, PTA/ ATC      11/9/2022                   Therapeutic Exercise and NMR EXR  [x] ([id number]) Provided verbal/tactile cueing for activities related to strengthening, flexibility, endurance, ROM for improvements in LE, proximal hip, and core control with self care, mobility, lifting, ambulation.  [] (84537) Provided verbal/tactile cueing for activities related to improving balance, coordination, kinesthetic sense, posture, motor skill, proprioception  to assist with LE, proximal hip, and core control in self care, mobility, lifting, ambulation and eccentric single leg control.      NMR and Therapeutic Activities:    [] (59109 or 33237) Provided verbal/tactile cueing for activities related to improving balance, coordination, kinesthetic sense, posture, motor skill, proprioception and motor activation to allow for proper function of core, proximal hip and LE with self care and ADLs  [] (44338) Gait Re-education- Provided training and instruction to the patient for proper LE, core and proximal hip recruitment and positioning and eccentric body weight control with ambulation re-education including up and down stairs     Home Exercise Program:  see above for suggestions for GYM program 7/19  [x] (48895) Reviewed/Progressed HEP activities related to strengthening, flexibility, endurance, ROM of core, proximal hip and LE for functional self-care, mobility, lifting and ambulation/stair navigation   [] (39578)Reviewed/Progressed HEP activities related to improving balance, coordination, kinesthetic sense, posture, motor skill, proprioception of core, proximal hip and LE for self care, mobility, lifting, and ambulation/stair navigation      Manual Treatments:  PROM / STM / Oscillations-Mobs:  G-I, II, III, IV (PA's, Inf., Post.)  [x] (91159) Provided manual therapy to mobilize LE, proximal hip and/or LS spine soft tissue/joints for the purpose of modulating pain, promoting relaxation,  increasing ROM, reducing/eliminating soft tissue swelling/inflammation/restriction, improving soft tissue extensibility and allowing for proper ROM for normal function with self care, mobility, lifting and ambulation. 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 Independent in HEP and progression per patient tolerance, in order to prevent re-injury. [] Progressing: [x] Met: [] Not Met: [] Adjusted  2. Patient will have a decrease in pain to facilitate improvement in movement, function, and ADLs as indicated by Functional Deficits. [] Progressing: [x] Met: [] Not Met: [] Adjusted     Long Term Goals: To be achieved in: 8 weeks  1. Disability index score of 25% or less for the LEFS to assist with reaching prior level of function. [x] Progressing: [] Met: [] Not Met: [] Adjusted  2. Patient will demonstrate increased AROM to WNL to allow for proper joint functioning as indicated by patients Functional Deficits. [] Progressing: [x] Met: [] Not Met: [] Adjusted  3.  Patient will demonstrate an increase in Strength to good proximal hip strength and control, within 5lb HHD in LE to allow for proper functional mobility as indicated by patients Functional Deficits. [x] Progressing: [] Met: [] Not Met: [] Adjusted  4. Patient will return to 15+ minutes of ambulation without increased symptoms or restriction to return to PLOF. [] Progressing: [x] Met: [] Not Met: [] Adjusted  5. Patient will tolerate 25+ minutes of sitting without increased symptoms or restriction to return to PLOF. [] Progressing: [x] Met: [] Not Met: [] Adjusted       Progression Towards Functional goals:  [x] Patient is progressing as expected towards functional goals listed. [] Progression is slowed due to complexities listed. [] Progression has been slowed due to co-morbidities. [] Plan just implemented, too soon to assess goals progression  [] Other:       Treatment/Activity Tolerance:  [x] Patient tolerated treatment well [] Patient limited by fatique  [] Patient limited by pain  [] Patient limited by other medical complications  [x] Other:  Biodex and functional testing today performed with less deficits compared to prior session on   July 19, 2022 . Pt making good gains with quad and hamstring power but is challenged with endurance pushing into 15 reps with his quad and hamstring ratios. Pt reported slight patellar tendon discomfort with Y balance with forward left SLS balance. Had 1 brief moment with SL hops coming down of 12\" step with a pop and pressure today but was able to keep working. Overall Progression Towards Functional goals/ Treatment Progress Update:  [x] Patient is progressing as expected towards functional goals listed. [] Progression is slowed due to complexities/Impairments listed. [] Progression has been slowed due to co-morbidities.   [] Plan just implemented, too soon to assess goals progression <30days   [] Goals require adjustment due to lack of progress  [] Patient is not progressing as expected and requires additional follow up with physician  [] Other    Prognosis for POC: [x] Good [] Fair  [] Poor    Patient requires continued skilled intervention: [x] Yes  [] No        PLAN:    Advised to continue w/  walk/ jogging up to 15 minutes+ at least 2x/ week. Plyometrics 2x week and only 1x week with pushing functional testing 1x/ week. Advised to monitor duration of walk/ jog with pushing cardio vs strength training. Advised to schedule in 3 weeks for followup Gap session. Push strength as able with endurance with quads/hamstring with higher reps with walk/ jog etc.  Begin pyramid setting with strengthening. Has 1 more approved visit for insurance but will continue with GAP. Brace to be ordered but custom was denied. Recommended schedule with Brian Augustin for [facility] w/ biodex/ functional testing. NPV Nov 30 ? With functional practice testing  [x] Continue per plan of care [] Alter current plan (see comments)  [] Plan of care initiated [] Hold pending MD visit advised to reschedule DR Brian Augustin [] Discharge    Electronically signed by: Lavelle Alvarado [id number], ROSSI Tomlinson, PT, DPT      Note: If patient does not return for scheduled/recommended follow up visits, this note will serve as a discharge from care along with the most recent update on progress.

## 2022-11-29 ENCOUNTER — HOSPITAL ENCOUNTER (OUTPATIENT)
Dept: PHYSICAL THERAPY | Age: 32
Discharge: HOME | End: 2022-11-29

## 2022-11-29 PROCEDURE — 9990000027 HC GAP GROUP: Performed by: SPECIALIST/TECHNOLOGIST

## 2022-11-29 NOTE — PROGRESS NOTES
[facility]    Phone: [phone]   Fax: [phone]    Isokinetic Strength Testing Results Summary  Kneeatient Name:  Fatimah Gonzalez    :  1990  MRN: [id number]  Restrictions/Precautions:   Medical/Treatment Diagnosis Information:    S/P ACL /LME Left       Insurance/Certification information:     Physician Information:   Ginger  Pain level: 0/10    Latex Allergy:  [x]NO      []YES  Preferred Language for Healthcare:   [x]English       []other:    Visit # Insurance Allowable Auth required? Date Range   29  6 GAP 30   [x]  Yes  []  No      Pain level:  0/10     SUBJECTIVE:   9 months s/p   pt reports having some soreness in the knee related to being busy over the weekend. Hasn't been to the gym  b/c of the holiday with thanksgiving.   Mingo Livingston    Pt admits to being on a cruise for about 1 week next week but plans to return in 2-3 weeks. OBJECTIVE:  Observation:   Test measurements:    3/15: steristrips intact, no drainage or overt s/sx of infection  3/28/22: L knee ext AROM = lacking 5 at beginning of session, 0 with PROM/OP; L knee flex AAROM = 113  22: L knee flex AAROM = 124 (w/wall slides)  22: L knee ext AROM = lacking 1 at rest, 0 with QS; flex AAROM = 135 (w/wall slides)  LEFS = 46/80 = 42% disability   Less visible distal patellar swelling, fat pad swelling today compared to  session. Improved ambulation with no antalgia, has B varus alignment  5/10 Pt has mild TTP below the patella/ infrapatellar fat pad (hoffas fat pad)     MMT = R knee flexion and extension = 5/5 . MMT L knee flexion and extension = 5-/5     LEFS = 47/80 = 41% disability. Mid patella circumferencal measurements R = 39.25 cm.  L = 41.0 cm.   22:  ROM LEFT RIGHT   Knee ext 0 0   Knee Flex 140 141   Strength (measured in lbs using hand held dynamometer) LEFT RIGHT   HIP Flexors  67.4 76.2   HIP Abductors NT today due to issues with dynamometer NT today due to issues with dynamometer   Knee EXT (quad) 67.0 54.9   Knee Flex (HS) NT today due to issues with dynamometer NT today due to issues with dynamometer     7/19/22 LEFS  53/80    RESTRICTIONS/PRECAUTIONS: s/p L ACLR w/ quad tendon allograft and partial lateral meniscectomy (2/25/22), hx of L ACLR (10 years ago)    Exercises/Interventions:  functional assessment today  with plyometrics/ Y balance/ sit- stand 11/29  Therapeutic Ex  Resistance Sets/sec Reps Notes   Elliptical/ BIKE 3' fwd/ bwd 6'   11/29   Incline calf stretch  Hamstring stretch EOB  Standing quad stretch  30\" 3x ea 11/29   [facility] ABD/HIP flex  B 75# 3 10x  7/6                 SLR +  30\" 3x    Bridges SL  W/ triple threat SB  15x  7/6                        Squats on airex  2 10 4/ 28   Wall slides for knee flexion  5\" 15           Leg press  ECC 0/90  #   110# 2  2 10x  15    7/6   HS curls  SL  40# 2 15x  8/31^ burnout sets      Leg ext 90/30 30# 2 15 8/31 ^burn out sests    Wall sits  w/BS   1min 3/4x  almost daily     Therapeutic Activities 10' t educated with RT gym with plan for strengthening, joint protection/ ROM limitations with leg extensions etc. 9/27   Forward step ups with R LE flexion 6\" 1 15    Objective measures  10'   9/27 biodex & functional testing   Sports cord circuit  4D fwd/ rev & side SLS w/ lateral stepping at end  15\" 4x 8/17   4 square jumping B 1-2,1-4,4-2 3-1,  cw/ ccw   Scissor lunge/  jumping      Small dist 10\"  ea 9/ 14  HEP   Broad jumps  B   SL hopping/ triple hop/ crossover/ 6m hop test    SL Sit/stand for 1 min   Hop for distance  6 M  15'  x10  11/9   SLS cone pick ups from 8\" step 5 cones 1 2x    [place] split squat off high low  2 10 With mild HHA   6\" step up/ hop down   12\"/ 18' /24\" step up B down     SL hop ups and SL hop downs on smaller steps  8/31   Combinations of SL and D   Dynamic warmup   ladders 11/9   FWD SL / lateral hopping left   Tuck jumps   x 12   180 x 5      4\" / 6\"  SL up/ SL down   Dup/ S dowm fwd to SLS/ plyo and take off explosion fwd & side X5  3 D plane11/9   NMR re-education 25'       LSD   4\" 2 10 HEP    Walk/ jog  2.5 - 3.5 mph  W/ brace  9/ 14   SLS w/ bosu pertubations  In multi planes 10x in row   11/9   Wall jumps   Tucks   15\" 3x  x15 9/ 14   Lateral stepping  Monster walks Wine + blue 1 3 laps 7/6   RDL's w/ 10#KB  2 10 7/6    SIT/STAND Left only 23\" from floor  2 15x 8/17   Retro and lateral slider lunges 10# KB 1 15x 5/ 10    Bosu lunges fwd  1 15x 5/ 10   Ladder drills 3/4 speed 5'   All planes 11/9   6\"/  12/ 18\"   B/ SL  Aerex SLS  5x  9/14   3/4 squats with TRX bands   1 10      TRX with SL squat to chair + aerex for balance  1 15x 6/8   Quarter squats with TRX bands   1 10     Squats w/ landmine    Deadlift from floor  Bar + 20# 2 10xea 8/17            Objective:  Isokinetic Test Results:  9/27/22  Bilateral Difference:  Quadricep 180 deg/sec: 10.2% [x] Deficit   [] Surplus 300 deg/sec: 26.4% [x] Deficit   [] Surplus   Hamstring 180 deg/sec: 20% [x] Deficit   [] Surplus 300 deg/sec: 23.7% [x] Deficit   [] Surplus     Normative Data, 180 degrees/second:  Quadricep Normal:  60% Patient: 52.6%   Hamstring Normal:  45% Patient: 39%     Normative Data, 300 degrees/second:  Quadricep Normal: 50%   Patient: 32.6%   Hamstring Normal: 40% Patient: 25.9%     11/29  FUNCTIONAL TESTING      Y balance Test Left Right Deficit   Fwd 77 82 6%   Retro Lateral 108 112 6%   Retro cross-behind 124 119 +4%   90 CM RT Limb length in         6 m timed hop 1:96 1:78 9%   Single Leg Squat testing 1 min.   Left Right  Deficit    24 25 4%         Single Leg Hop for Distance  Left  Right Deficit    1 m 85\" 185 1 m 86\"  186 1%               Triple Hop for distance  Left Right Deficit    4 m 90\"  490 5m 27\"  527 7%   Crossover hops  4 m 29\"  429 4 m 89\"  489 12%   11/29/ 22 [place]land scale of Kinesiophobia        35 final score     EXERCISES   Plyometrics   6\" 2up/ 1 down 12\" + 18\"B up/down   x8   6\" SL/ SL 5x  10/19   Landmine+ 45# X1 5 B squat and lunge 10/19                                     Goals:      Charges:  Timed Code Treatment Minutes: 60   Total Treatment Minutes: 60     [] EVAL  [] TE(85975) x   [] IONTO  [] NMR (45477) x     [] VASO  [] Manual (24406) x       [] Other: Physical Performance Test (81155) x   4  [] TA x      [] Mech Traction (88036)  [] ES(attended) (79906)      [] ES (un) (13952): The findings of this test would result with the following summary and recommendations:  Pt tolerated isokinetic testing well. Deficits throughout compared to uninvolved and expected ranges. Return to Play:    [x]  Stage 1: Intro to Strength   [x]  Stage 2: progress strength but no active jogging yet   []  Stage 3: Return to Jump and Strength   []  Stage 4: Dynamic Strength and Agility   []  Stage 5: Sport Specific Training     []  Ready to Return to Play, Meets All Above Stages   [x]  Not Ready for Return to Sports   Comments:         Sincerely,  Mohinder Encarnacion, PTA/ ATC      11/29/2022                   Therapeutic Exercise and NMR EXR  [x] [phone]) Provided verbal/tactile cueing for activities related to strengthening, flexibility, endurance, ROM for improvements in LE, proximal hip, and core control with self care, mobility, lifting, ambulation.  [] (03616) Provided verbal/tactile cueing for activities related to improving balance, coordination, kinesthetic sense, posture, motor skill, proprioception  to assist with LE, proximal hip, and core control in self care, mobility, lifting, ambulation and eccentric single leg control.      NMR and Therapeutic Activities:    [] (45920 or 53279) Provided verbal/tactile cueing for activities related to improving balance, coordination, kinesthetic sense, posture, motor skill, proprioception and motor activation to allow for proper function of core, proximal hip and LE with self care and ADLs  [] (81516) Gait Re-education- Provided training and instruction to the patient for proper LE, core and proximal hip recruitment and positioning and eccentric body weight control with ambulation re-education including up and down stairs     Home Exercise Program:  see above for suggestions for GYM program 11/29 will be on cruise but advised to progress strength as able with lifting/ plyometrics and running as able  [x] (01526) Reviewed/Progressed HEP activities related to strengthening, flexibility, endurance, ROM of core, proximal hip and LE for functional self-care, mobility, lifting and ambulation/stair navigation   [] (44507)Reviewed/Progressed HEP activities related to improving balance, coordination, kinesthetic sense, posture, motor skill, proprioception of core, proximal hip and LE for self care, mobility, lifting, and ambulation/stair navigation      Manual Treatments:  PROM / STM / Oscillations-Mobs:  G-I, II, III, IV (PA's, Inf., Post.)  [] (45336) Provided manual therapy to mobilize LE, proximal hip and/or LS spine soft tissue/joints for the purpose of modulating pain, promoting relaxation,  increasing ROM, reducing/eliminating soft tissue swelling/inflammation/restriction, improving soft tissue extensibility and allowing for proper ROM for normal function with self care, mobility, lifting and ambulation. 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 Independent in HEP and progression per patient tolerance, in order to prevent re-injury. [] Progressing: [x] Met: [] Not Met: [] Adjusted  2. Patient will have a decrease in pain to facilitate improvement in movement, function, and ADLs as indicated by Functional Deficits. [] Progressing: [x] Met: [] Not Met: [] Adjusted     Long Term Goals: To be achieved in: 8 weeks  1.  Disability index score of 25% or less for the LEFS to assist with reaching prior level of function. [x] Progressing: [] Met: [] Not Met: [] Adjusted  2. Patient will demonstrate increased AROM to WNL to allow for proper joint functioning as indicated by patients Functional Deficits. [] Progressing: [x] Met: [] Not Met: [] Adjusted  3. Patient will demonstrate an increase in Strength to good proximal hip strength and control, within 5lb HHD in LE to allow for proper functional mobility as indicated by patients Functional Deficits. [x] Progressing: [] Met: [] Not Met: [] Adjusted  4. Patient will return to 15+ minutes of ambulation without increased symptoms or restriction to return to PLOF. [] Progressing: [x] Met: [] Not Met: [] Adjusted  5. Patient will tolerate 25+ minutes of sitting without increased symptoms or restriction to return to PLOF. [] Progressing: [x] Met: [] Not Met: [] Adjusted       Progression Towards Functional goals:  [x] Patient is progressing as expected towards functional goals listed. [] Progression is slowed due to complexities listed. [] Progression has been slowed due to co-morbidities. [] Plan just implemented, too soon to assess goals progression  [] Other:       Treatment/Activity Tolerance:  [x] Patient tolerated treatment well [] Patient limited by fatique  [] Patient limited by pain  [] Patient limited by other medical complications  [] Other:   functional testing today performed with less deficits compared to prior session on   9/27/ 22 . Improved landing mechanics and gains made in times and distances with hop testing today. Pt making good gains with quad and hamstring power but is challenged with endurance pushing into 15 reps with his quad and hamstring ratios. Denied any pain with hop testing or with Y balance today. Overall improvements demonstrated in all areas compared to prior testing on 9/ 27/ 22.      Overall Progression Towards Functional goals/ Treatment Progress Update:  [x] Patient is progressing as expected towards functional goals listed. [] Progression is slowed due to complexities/Impairments listed. [] Progression has been slowed due to co-morbidities. [] Plan just implemented, too soon to assess goals progression <30days   [] Goals require adjustment due to lack of progress  [] Patient is not progressing as expected and requires additional follow up with physician  [] Other    Prognosis for POC: [x] Good [] Fair  [] Poor    Patient requires continued skilled intervention: [x] Yes  [] No        PLAN:    Advised to continue w/  walk/ jogging up to 15 minutes+ at least 2x/ week. Plyometrics 2x week and only 1x week with pushing functional testing 1x/ week. Advised to monitor duration of walk/ jog with pushing cardio vs strength training. Advised to schedule in 3 weeks for followup Gap session in mid Dec. Push strength as able with endurance with quads/hamstring with higher reps with walk/ jog etc.  Begin pyramid setting with  scheduled with Belia Miranda for [facility]   w/ biodex/ functional testing on Dec 20. [x] Continue per plan of care [] Alter current plan (see comments)  [] Plan of care initiated [] Hold pending MD visit [] Discharge    Electronically signed by: Brandie Baugh, ATC   Assisted By: Mila Banegas SPT   Excell Dye, PT, DPT      Note: If patient does not return for scheduled/recommended follow up visits, this note will serve as a discharge from care along with the most recent update on progress.

## 2022-12-20 ENCOUNTER — HOSPITAL ENCOUNTER (OUTPATIENT)
Dept: PHYSICAL THERAPY | Age: 32
Discharge: HOME | End: 2022-12-20
Payer: COMMERCIAL

## 2022-12-20 PROCEDURE — 97110 THERAPEUTIC EXERCISES: CPT | Performed by: SPECIALIST/TECHNOLOGIST

## 2022-12-20 PROCEDURE — 97530 THERAPEUTIC ACTIVITIES: CPT | Performed by: SPECIALIST/TECHNOLOGIST

## 2022-12-20 PROCEDURE — 97750 PHYSICAL PERFORMANCE TEST: CPT | Performed by: SPECIALIST/TECHNOLOGIST

## 2022-12-20 NOTE — PROGRESS NOTES
[facility]    Phone: [phone]   Fax: [phone]    Isokinetic Strength Testing Results Summary  Kneeatient Name:  Laly Alvarado    :  1990  MRN: [id number]  Restrictions/Precautions:   Medical/Treatment Diagnosis Information:    S/P ACL /LME Left       Insurance/Certification information:     Physician Information:   Sherie De La Cruz  Pain level: 0/10    Latex Allergy:  [x]NO      []YES  Preferred Language for Healthcare:   [x]English       []other:    Visit # Insurance Allowable Auth required? Date Range   30  6 GAP 30   [x]  Yes  []  No      Pain level:  0/10     SUBJECTIVE:   9 months s/p   Pt reports having a good trip and only able to workout but was very active with outings etc.  Noticed some lateral knee soreness  especially with stairs, going down. Feels the grinding or compression with loading it. Jogging up to 30 minutes and getting to gym 2x week. Jogging has intermittent breaks as needed.  Nica Sky         OBJECTIVE:  Observation:   Test measurements:    3/15: steristrips intact, no drainage or overt s/sx of infection  3/28/22: L knee ext AROM = lacking 5 at beginning of session, 0 with PROM/OP; L knee flex AAROM = 113  22: L knee flex AAROM = 124 (w/wall slides)  22: L knee ext AROM = lacking 1 at rest, 0 with QS; flex AAROM = 135 (w/wall slides)  LEFS = 46/80 = 42% disability   Less visible distal patellar swelling, fat pad swelling today compared to  session. Improved ambulation with no antalgia, has B varus alignment  5/10 Pt has mild TTP below the patella/ infrapatellar fat pad (hoffas fat pad)     MMT = R knee flexion and extension = 5/5 . MMT L knee flexion and extension = 5-/5     LEFS = 47/80 = 41% disability. Mid patella circumferencal measurements R = 39.25 cm.  L = 41.0 cm.   22:  ROM LEFT RIGHT   Knee ext 0 0   Knee Flex 140 141   Strength (measured in lbs using hand held dynamometer) LEFT RIGHT   HIP Flexors  67.4 76.2   HIP Abductors NT today due to issues with dynamometer NT today due to issues with dynamometer   Knee EXT (quad) 67.0 54.9   Knee Flex (HS) NT today due to issues with dynamometer NT today due to issues with dynamometer     7/19/22 LEFS     53/80  12/20/22  LEFS  65/80  RESTRICTIONS/PRECAUTIONS: s/p L ACLR w/ quad tendon allograft and partial lateral meniscectomy (2/25/22), hx of L ACLR (10 years ago)    Exercises/Interventions:  functional assessment today  with plyometrics/ Y balance/ sit- stand 11/29  Therapeutic Ex  Resistance Sets/sec Reps Notes   Elliptical/ BIKE 3' fwd/ bwd 6' 12/20   Incline calf stretch  Hamstring stretch EOB  Standing quad stretch  30\" 3x ea 12/20   MH ABD/HIP flex  B 75# 3 10x  7/6                 SLR +  30\" 3x    Bridges SL  W/ triple threat SB  15x  7/6                        Squats on airex  2 10 4/ 28   Wall slides for knee flexion  5\" 15           Leg press  ECC 0/90  #   110# 3  2 10x  15 12/20   Burn out sets   HS curls  SL  40# 2 15x 12/20  burnout sets      Leg ext 90/30 45#35# 2 15 12/20  burnout sets   Wall sits  w/BS   1min 3/4x  almost daily     Therapeutic Activities 10' t educated with RT gym with plan for strengthening, joint protection/ ROM limitations with leg extensions etc. 9/27   Forward step ups with R LE flexion 6\" 1 15    Objective measures  10'   12/20 [employer] cord circuit  4D fwd/ rev & side SLS w/ lateral stepping at end  15\" 4x 8/17   4 square jumping B 1-2,1-4,4-2 3-1,  cw/ ccw   Scissor lunge/  jumping      Small dist 10\"  ea 9/ 14  HEP   SLS cone pick ups from 8\" step 5 cones 1 2x    [facility] off high low  2 10 With mild HHA   6\" step up/ hop down   12\"/ 18' /24\" step up B down     SL hop ups and SL hop downs on smaller steps  8/31   Combinations of SL and D   Dynamic warmup   ladders     4\" / 6\"  SL up/ SL down   Dup/ S dowm fwd to SLS/ plyo and take off explosion fwd & side X5  3 D planeNMR re-education 22'       LSD   4\" 2 10 HEP    Walk/ jog  2.5 - 3.5 mph  W/ brace  9/ 14   SLS w/ bosu pertubations  In multi planes 10x in row  Wall jumps   Tucks   15\" 3x  x15 9/ 14   Lateral stepping  Monster walks Wine + blue 1 3 laps 7/6   RDL's w/ 10#KB  2 10 7/6    SIT/STAND Left only 23\" from floor  2 15x 8/17   Retro and lateral slider lunges 10# KB 1 15x 5/ 10    Bosu lunges fwd  1 15x 5/ 10   Ladder drills 3/4 speed 5'   All planes 11/9   6\"/  12/ 18\"   B/ SL  Aerex SLS  5x  9/14   3/4 squats with TRX bands   1 10      TRX with SL squat to chair + aerex for balance  1 15x 6/8   Quarter squats with TRX bands   1 10     Squats w/ landmine    Deadlift from floor  Bar + 20# 2 10xea 8/17            Objective:12/20    Bilateral Difference:  Quadricep 180 deg/sec: 23% [x] Deficit   [] Surplus 300 deg/sec: 5.4% [x] Deficit   [] Surplus   Hamstring 180 deg/sec: 12.9% [x] Deficit   [] Surplus 300 deg/sec: % [x] Deficit   1.4 Surplus     Normative Data, 180 degrees/second:  Quadricep Normal:  65% Patient: 56%   Hamstring Normal:  45% Patient: 47.9%     Normative Data, 300 degrees/second:  Quadricep Normal: 50%   Patient: 43.4%   Hamstring Normal: 40% Patient: 36.9%     11/29  FUNCTIONAL TESTING      Y balance Test Left Right Deficit   Fwd 77 82 6%   Retro Lateral 108 112 6%   Retro cross-behind 124 119 +4%   90 CM RT Limb length in         6 m timed hop 1:96 1:78 9%   Single Leg Squat testing 1 min.   Left Right  Deficit    24 25 4%         Single Leg Hop for Distance  Left  Right Deficit    1 m 85\" 185 1 m 86\"  186 1%               Triple Hop for distance  Left Right Deficit    4 m 90\"  490 5m 27\"  527 7%   Crossover hops  4 m 29\"  429 4 m 89\"  489 12%   11/29/ 22 [place] scale of Kinesiophobia        35 final score     EXERCISES   Plyometrics   6\" 2up/ 1 down 12\" + 18\"B up/down   x8   6\" SL/ SL 5x  10/19   Landmine+ 45# X1 5 B squat and lunge 10/19 Goals:      Charges:  Timed Code Treatment Minutes: 60   Total Treatment Minutes: 60     [] EVAL  [x] LK(54493) x 1  [] IONTO  [] NMR (10011) x     [] VASO  [] Manual (09012) x       [x] Other: Physical Performance Test (85456) x   2  [x] TA x 1     [] Mech Traction (29706)  [] ES(attended) (07748)      [] ES (un) (57294): The findings of this test would result with the following summary and recommendations:  Pt tolerated isokinetic testing well. Deficits throughout compared to uninvolved and expected ranges. Return to Play:    []  Stage 1: Intro to Strength   []  Stage 2: progress strength but no active jogging yet   []  Stage 3: Return to Jump and Strength   []  Stage 4: Dynamic Strength and Agility   [x]  Stage 5: Sport Specific Training     []  Ready to Return to Play, Meets All Above Stages   [x]  Not Ready for Return to Sports   Comments:         Sincerely,  Geeta Baca, PTA/ ATC      12/20/2022                   Therapeutic Exercise and NMR EXR  [x] ([address]) Provided verbal/tactile cueing for activities related to strengthening, flexibility, endurance, ROM for improvements in LE, proximal hip, and core control with self care, mobility, lifting, ambulation.  [] (96657) Provided verbal/tactile cueing for activities related to improving balance, coordination, kinesthetic sense, posture, motor skill, proprioception  to assist with LE, proximal hip, and core control in self care, mobility, lifting, ambulation and eccentric single leg control.      NMR and Therapeutic Activities:    [] (63126 or 78526) Provided verbal/tactile cueing for activities related to improving balance, coordination, kinesthetic sense, posture, motor skill, proprioception and motor activation to allow for proper function of core, proximal hip and LE with self care and ADLs  [] (17438) Gait Re-education- Provided training and instruction to the patient for proper LE, core and proximal hip recruitment and positioning and eccentric body weight control with ambulation re-education including up and down stairs     Home Exercise Program:  12/20 see above for suggestions for GYM program   [x] ([id number]) Reviewed/Progressed HEP activities related to strengthening, flexibility, endurance, ROM of core, proximal hip and LE for functional self-care, mobility, lifting and ambulation/stair navigation   [] (29084)Reviewed/Progressed HEP activities related to improving balance, coordination, kinesthetic sense, posture, motor skill, proprioception of core, proximal hip and LE for self care, mobility, lifting, and ambulation/stair navigation      Manual Treatments:  PROM / STM / Oscillations-Mobs:  G-I, II, III, IV (PA's, Inf., Post.)  [] (64711) Provided manual therapy to mobilize LE, proximal hip and/or LS spine soft tissue/joints for the purpose of modulating pain, promoting relaxation,  increasing ROM, reducing/eliminating soft tissue swelling/inflammation/restriction, improving soft tissue extensibility and allowing for proper ROM for normal function with self care, mobility, lifting and ambulation. 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 Independent in HEP and progression per patient tolerance, in order to prevent re-injury. [] Progressing: [x] Met: [] Not Met: [] Adjusted  2. Patient will have a decrease in pain to facilitate improvement in movement, function, and ADLs as indicated by Functional Deficits. [] Progressing: [x] Met: [] Not Met: [] Adjusted     Long Term Goals: To be achieved in: 8 weeks  1. Disability index score of 25% or less for the LEFS to assist with reaching prior level of function. [x] Progressing: [] Met: [] Not Met: [] Adjusted  2.  Patient will demonstrate increased AROM to WNL to allow for proper joint functioning as indicated by patients Functional Deficits. [] Progressing: [x] Met: [] Not Met: [] Adjusted  3. Patient will demonstrate an increase in Strength to good proximal hip strength and control, within 5lb HHD in LE to allow for proper functional mobility as indicated by patients Functional Deficits. [x] Progressing: [] Met: [] Not Met: [] Adjusted  4. Patient will return to 15+ minutes of ambulation without increased symptoms or restriction to return to PLOF. [] Progressing: [x] Met: [] Not Met: [] Adjusted  5. Patient will tolerate 25+ minutes of sitting without increased symptoms or restriction to return to PLOF. [] Progressing: [x] Met: [] Not Met: [] Adjusted       Progression Towards Functional goals:  [x] Patient is progressing as expected towards functional goals listed. [] Progression is slowed due to complexities listed. [] Progression has been slowed due to co-morbidities. [] Plan just implemented, too soon to assess goals progression  [] Other:       Treatment/Activity Tolerance:  [x] Patient tolerated treatment well [x] Patient limited by fatique  [] Patient limited by pain  [] Patient limited by other medical complications  [] Other:  Pt tested with Provadeex today and results posted above. Overall Progression Towards Functional goals/ Treatment Progress Update:  [x] Patient is progressing as expected towards functional goals listed. [] Progression is slowed due to complexities/Impairments listed. [] Progression has been slowed due to co-morbidities. [] Plan just implemented, too soon to assess goals progression <30days   [] Goals require adjustment due to lack of progress  [] Patient is not progressing as expected and requires additional follow up with physician  [] Other    Prognosis for POC: [x] Good [] Fair  [] Poor    Advised to continue w/ pt workouts, push cardio and left quad power while monitoring lateral joint discomfort.    [x] Continue per plan of care [] Alter current plan (see comments)  [] Plan of care initiated [] Hold pending MD visit [] Discharge    Electronically signed by: Shweta Perez [id number], ATC    Luis De, PT, DPT      Note: If patient does not return for scheduled/recommended follow up visits, this note will serve as a discharge from care along with the most recent update on progress.

## 2022-12-29 ENCOUNTER — OFFICE VISIT (OUTPATIENT)
Dept: ORTHOPEDIC SURGERY | Age: 32
End: 2022-12-29
Payer: COMMERCIAL

## 2022-12-29 VITALS — WEIGHT: 232.4 LBS | BODY MASS INDEX: 35.22 KG/M2 | RESPIRATION RATE: 16 BRPM | HEIGHT: 68 IN

## 2022-12-29 DIAGNOSIS — S83.512D RUPTURE OF ANTERIOR CRUCIATE LIGAMENT OF LEFT KNEE, SUBSEQUENT ENCOUNTER: Primary | ICD-10-CM

## 2022-12-29 PROCEDURE — G8417 CALC BMI ABV UP PARAM F/U: HCPCS | Performed by: STUDENT IN AN ORGANIZED HEALTH CARE EDUCATION/TRAINING PROGRAM

## 2022-12-29 PROCEDURE — G8427 DOCREV CUR MEDS BY ELIG CLIN: HCPCS | Performed by: STUDENT IN AN ORGANIZED HEALTH CARE EDUCATION/TRAINING PROGRAM

## 2022-12-29 PROCEDURE — 1036F TOBACCO NON-USER: CPT | Performed by: STUDENT IN AN ORGANIZED HEALTH CARE EDUCATION/TRAINING PROGRAM

## 2022-12-29 PROCEDURE — G8484 FLU IMMUNIZE NO ADMIN: HCPCS | Performed by: STUDENT IN AN ORGANIZED HEALTH CARE EDUCATION/TRAINING PROGRAM

## 2022-12-29 PROCEDURE — 99213 OFFICE O/P EST LOW 20 MIN: CPT | Performed by: STUDENT IN AN ORGANIZED HEALTH CARE EDUCATION/TRAINING PROGRAM

## 2022-12-29 NOTE — PROGRESS NOTES
History:  Dar Balderrama is here for left knee follow up. He had left knee arthroscopic surgery on 2/26/22. Findings at surgery: Left knee anterior cruciate ligament tear and lateral meniscus tear. The patient's pain is rated at 2/10. Doing PT at the [facility] office. He still complains of anterior knee pain with going down stairs or prolonged walking. 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 Physical Examination:  Resp 16   Ht 5' 8\" (1.727 m)   Wt 232 lb 6.4 oz (105.4 kg)   BMI 35.34 kg/m²    Patient is awake, alert, and in no acute distress. No effusion  Left knee ROM 0-120  Negative Lachman test.  Little to no quad atrophy. No significant quad inhibition. Isokinetic Test Results:  12/20/22  Objective:12/20     Bilateral Difference:  Quadricep 180 deg/sec: 23% [x] Deficit   [] Surplus 300 deg/sec: 5.4% [x] Deficit   [] Surplus   Hamstring 180 deg/sec: 12.9% [x] Deficit   [] Surplus 300 deg/sec: % [x] Deficit   1.4 Surplus      Normative Data, 180 degrees/second:  Quadricep Normal:  65% Patient: 56%   Hamstring Normal:  45% Patient: 47.9%      Normative Data, 300 degrees/second:  Quadricep Normal: 50%   Patient: 43.4%   Hamstring Normal: 40% Patient: 36.9%      11/29  FUNCTIONAL TESTING         Y balance Test Left Right Deficit   Fwd 77 82 6%   Retro Lateral 108 112 6%   Retro cross-behind 124 119 +4%   90 CM RT Limb length in            6 m timed hop 1:96 1:78 9%   Single Leg Squat testing 1 min.   Left Right  Deficit     24 25 4%             Single Leg Hop for Distance  Left  Right Deficit     1 m 85\" 185 1 m 86\"  186 1%                       Triple Hop for distance  Left Right Deficit     4 m 90\"  490 5m 27\"  527 7%   Crossover hops  4 m 29\"  429 4 m 89\"  489 12%   11/29/ 22 [place] scale of Kinesiophobia        35 final score    Assessment:   10 months status post left knee arthroscopy, anterior cruciate ligament reconstruction with quadriceps tendon allograft and partial lateral meniscectomy. Plan:   Finish PT. Continue HEP and strengthening to progress back to sport specific exercise and wrestling. Ice as needed. NSAIDs as needed. Follow-up in 2 months for evaluation of progress or prn if problems. Will repeat biodex prior to this visit. Jasmin Greer PA-C  Board Certified by the [employer] on Certification of [address] and [address] [place] [address]        This note was generated with use of a verbal recognition program [facility]) and was checked for errors. It is possible that there are still dictated errors within this office note. If so, please bring any errors to my attention for an addendum. All efforts were made to ensure that this office note is accurate.

## 2022-12-30 ENCOUNTER — OFFICE VISIT (OUTPATIENT)
Dept: INTERNAL MEDICINE CLINIC | Age: 32
End: 2022-12-30
Payer: COMMERCIAL

## 2022-12-30 ENCOUNTER — TELEPHONE (OUTPATIENT)
Dept: INTERNAL MEDICINE CLINIC | Age: 32
End: 2022-12-30

## 2022-12-30 VITALS
WEIGHT: 229 LBS | DIASTOLIC BLOOD PRESSURE: 72 MMHG | HEART RATE: 65 BPM | TEMPERATURE: 99 F | BODY MASS INDEX: 34.71 KG/M2 | SYSTOLIC BLOOD PRESSURE: 126 MMHG | OXYGEN SATURATION: 98 % | HEIGHT: 68 IN

## 2022-12-30 DIAGNOSIS — H60.391 OTHER INFECTIVE ACUTE OTITIS EXTERNA OF RIGHT EAR: ICD-10-CM

## 2022-12-30 DIAGNOSIS — H66.001 NON-RECURRENT ACUTE SUPPURATIVE OTITIS MEDIA OF RIGHT EAR WITHOUT SPONTANEOUS RUPTURE OF TYMPANIC MEMBRANE: Primary | ICD-10-CM

## 2022-12-30 PROCEDURE — G8427 DOCREV CUR MEDS BY ELIG CLIN: HCPCS | Performed by: INTERNAL MEDICINE

## 2022-12-30 PROCEDURE — 99213 OFFICE O/P EST LOW 20 MIN: CPT | Performed by: INTERNAL MEDICINE

## 2022-12-30 PROCEDURE — 1036F TOBACCO NON-USER: CPT | Performed by: INTERNAL MEDICINE

## 2022-12-30 PROCEDURE — G8417 CALC BMI ABV UP PARAM F/U: HCPCS | Performed by: INTERNAL MEDICINE

## 2022-12-30 PROCEDURE — 4130F TOPICAL PREP RX AOE: CPT | Performed by: INTERNAL MEDICINE

## 2022-12-30 PROCEDURE — G8484 FLU IMMUNIZE NO ADMIN: HCPCS | Performed by: INTERNAL MEDICINE

## 2022-12-30 RX ORDER — CIPROFLOXACIN 0.5 MG/.25ML
0.25 SOLUTION/ DROPS AURICULAR (OTIC) 2 TIMES DAILY
COMMUNITY
End: 2022-12-30

## 2022-12-30 RX ORDER — AMOXICILLIN AND CLAVULANATE POTASSIUM 875; 125 MG/1; MG/1
1 TABLET, FILM COATED ORAL 2 TIMES DAILY
Qty: 20 TABLET | Refills: 0 | Status: SHIPPED | OUTPATIENT
Start: 2022-12-30 | End: 2023-01-09

## 2022-12-30 NOTE — TELEPHONE ENCOUNTER
Patient is calling back to state his COVID test was negative. Please advise on how patient should be scheduled to address.

## 2022-12-30 NOTE — TELEPHONE ENCOUNTER
Patient is calling with concerns of an ear infection that begin 3 weeks ago after getting water in his ear while on a cruise. Patient went to the [address] [place] [address] for this on 12/13 and they gave him ear drops for this. He states those have not helped and that now he is still experiencing a pressure in his right ear in addition to developing cold sxs this past week. Patient is unsure if the ear infection has turned into an upper respiration infection as his sxs this week include:  Ear ache  Cough  Congestion    Patient will call back with COVID test results today. He is aware Dr. Ana Bentley is out of office but should patient complete e-visit or be scheduled for VV? Please advise.

## 2022-12-30 NOTE — PROGRESS NOTES
Date of Visit:  2022    CC: Bertha Archer (: 1990) is a 33 y.o. male, established patient, here for evaluation/re-evaluation of the following medical concerns:    ASSESSMENT/PLAN:  Non-recurrent acute suppurative otitis media of right ear without spontaneous rupture of tympanic membrane  Likely complicated of viral URI. Supportive care guidelines discussed. Patient was advised to take probiotic, such as DanActiv yogurt drink, Florastor or Culturelle, twice daily while on antibiotics and for 1 week after.    -     amoxicillin-clavulanate (AUGMENTIN) 875-125 MG per tablet; Take 1 tablet by mouth 2 times daily for 10 days Take with meals. , Disp-20 tablet, R-0Normal  Other infective acute otitis externa of right ear   Persistent despite long course of Cipro/dex drops. Will try treating systemically with Augmentin. If no significant improvement after 1 week, he will call for ENT referral.    Vitals:    22 1510   BP: 126/72   Pulse: 65   Temp: 99 °F (37.2 °C)   SpO2: 98%   Weight: 229 lb (103.9 kg)   Height: 5' 8\" (1.727 m)      Estimated body mass index is 34.82 kg/m² as calculated from the following:    Height as of this encounter: 5' 8\" (1.727 m). Weight as of this encounter: 229 lb (103.9 kg). Wt Readings from Last 3 Encounters:   22 229 lb (103.9 kg)   22 232 lb 6.4 oz (105.4 kg)   22 232 lb (105.2 kg)     BP Readings from Last 3 Encounters:   22 126/72   22 110/70   22 136/72     HPI  Respiratory Symptoms:  Patient complains of 8 day(s) history of fever: low grade, myalgias/arthralgias, headache, ear congestion: on the right, clear nasal discharge, nasal congestion, sore throat, productive cough: Sputum is white and yellow, and cervical adenopathy. Symptoms have been unchanged with time. He denies any other symptoms . Relevant PMH: No pertinent PMH. Smoking history:  He  reports that he has never smoked.  He has never used smokeless tobacco. He has had no known ill contacts. Treatment to date: Theraflu. Prescribed Cipro/dex drops 12/13 for otitis externa- still using. Home COVID x2. UTD on COVID and flu vaccines. ROS  As documented above    Physical Exam  Constitutional:       General: He is not in acute distress. Appearance: He is well-developed. HENT:      Right Ear: Swelling (proximal ear canal, with mild erythema, no significant debris) present. No tenderness. A middle ear effusion is present. Tympanic membrane is injected. Left Ear: Tympanic membrane, ear canal and external ear normal.      Mouth/Throat:      Pharynx: Posterior oropharyngeal erythema present. No oropharyngeal exudate. Eyes:      Conjunctiva/sclera: Conjunctivae normal.   Pulmonary:      Effort: Pulmonary effort is normal. No respiratory distress. Breath sounds: Normal breath sounds. No wheezing, rhonchi or rales. Musculoskeletal:      Cervical back: Neck supple. Lymphadenopathy:      Cervical: Cervical adenopathy (submandibular) present. Skin:     General: Skin is warm and dry. Findings: No erythema or rash. Neurological:      Mental Status: He is alert and oriented to person, place, and time. Psychiatric:         Mood and Affect: Mood normal.         Speech: Speech normal.         Behavior: Behavior normal.         --Rukhsana Gutierrez MD on 12/30/2022 at 3:52 PM    An electronic signature was used to authenticate this note.

## 2022-12-30 NOTE — TELEPHONE ENCOUNTER
Does anyone have any availability to see patient via vv today? If not please have patient submit an e-visit to me. Thank you.

## 2023-01-16 ENCOUNTER — OFFICE VISIT (OUTPATIENT)
Dept: INTERNAL MEDICINE CLINIC | Age: 33
End: 2023-01-16
Payer: COMMERCIAL

## 2023-01-16 VITALS
TEMPERATURE: 97.4 F | SYSTOLIC BLOOD PRESSURE: 136 MMHG | HEART RATE: 86 BPM | WEIGHT: 234.2 LBS | BODY MASS INDEX: 35.61 KG/M2 | OXYGEN SATURATION: 98 % | DIASTOLIC BLOOD PRESSURE: 74 MMHG

## 2023-01-16 DIAGNOSIS — H69.81 DYSFUNCTION OF RIGHT EUSTACHIAN TUBE: Primary | ICD-10-CM

## 2023-01-16 PROCEDURE — G8427 DOCREV CUR MEDS BY ELIG CLIN: HCPCS | Performed by: INTERNAL MEDICINE

## 2023-01-16 PROCEDURE — G8484 FLU IMMUNIZE NO ADMIN: HCPCS | Performed by: INTERNAL MEDICINE

## 2023-01-16 PROCEDURE — 99213 OFFICE O/P EST LOW 20 MIN: CPT | Performed by: INTERNAL MEDICINE

## 2023-01-16 PROCEDURE — G8417 CALC BMI ABV UP PARAM F/U: HCPCS | Performed by: INTERNAL MEDICINE

## 2023-01-16 PROCEDURE — 1036F TOBACCO NON-USER: CPT | Performed by: INTERNAL MEDICINE

## 2023-01-16 RX ORDER — METHYLPREDNISOLONE 4 MG/1
TABLET ORAL
Qty: 1 KIT | Refills: 0 | Status: SHIPPED | OUTPATIENT
Start: 2023-01-16 | End: 2023-01-22

## 2023-01-16 RX ORDER — CETIRIZINE HYDROCHLORIDE 10 MG/1
10 TABLET ORAL DAILY
Qty: 30 TABLET | Refills: 5 | Status: SHIPPED | OUTPATIENT
Start: 2023-01-16 | End: 2023-01-16

## 2023-01-16 ASSESSMENT — PATIENT HEALTH QUESTIONNAIRE - PHQ9
SUM OF ALL RESPONSES TO PHQ QUESTIONS 1-9: 0
SUM OF ALL RESPONSES TO PHQ QUESTIONS 1-9: 0
SUM OF ALL RESPONSES TO PHQ9 QUESTIONS 1 & 2: 0
1. LITTLE INTEREST OR PLEASURE IN DOING THINGS: 0
SUM OF ALL RESPONSES TO PHQ QUESTIONS 1-9: 0
SUM OF ALL RESPONSES TO PHQ QUESTIONS 1-9: 0
2. FEELING DOWN, DEPRESSED OR HOPELESS: 0

## 2023-01-16 NOTE — PROGRESS NOTES
Genesis Amador (:  1990) is a 33 y.o. male,Established patient, here for evaluation of the following chief complaint(s):  Follow-up (Ear infection right)    ASSESSMENT/PLAN:  1. Dysfunction of right eustachian tube  Patient with ear pressure and crackling sensation in the ear. On exam TM does show some very minimal redness at the top however I am able to see all structures. I do not feel the patient has an ongoing otitis media. I will however treat with Flonase, Allegra and Medrol Dosepak. If no improvement will give another round of antibiotics and referral to ENT. Patient to notify me within the next few days if if symptoms are worsening or not improving. Patient to keep prior recommended follow up this was an acute visit. SUBJECTIVE/OBJECTIVE:  HPI    Patient is a 44-year-old male with past medical history of GERD, JULIAN and obesity who presents secondary to ear pressure. Went on cruise and got water in his ear. When got back went to [facility] clinic  and was given some drops for ongoing ear discomfort and pain. Then 1 week later was not getting better and came in on  because ear was still not feeling good and also felt sick with congestion runny nose and cough. Patient saw one of my partners who treated him with Augmentin for otitis media. Since taking an antibiotic he notes still feels pressure in his ear. Pain has resolved completely. Feels muted sound. Some tenderness behind the ear. Denies any pain in the ear. The rest of his upper respiratory symptoms are improved. Not feeling sick any more. Denies any fevers. Review of Systems ROS negative except for those noted in the HPI above. Vitals:    23 1137   BP: 136/74   Pulse: 86   Temp: 97.4 °F (36.3 °C)   SpO2: 98%         Physical Exam  Constitutional:       General: He is not in acute distress. Appearance: Normal appearance. He is not ill-appearing. HENT:      Head: Normocephalic and atraumatic. Right Ear: Ear canal and external ear normal.      Left Ear: Tympanic membrane, ear canal and external ear normal.      Ears:      Comments: Right TM with very minimal redness at the top of the TM. Good cone of light and other anatomy noted on exam.   Eyes:      Extraocular Movements: Extraocular movements intact. Conjunctiva/sclera: Conjunctivae normal.   Cardiovascular:      Rate and Rhythm: Normal rate and regular rhythm. Heart sounds: No murmur heard. No friction rub. No gallop. Pulmonary:      Effort: Pulmonary effort is normal. No respiratory distress. Breath sounds: Normal breath sounds. No wheezing, rhonchi or rales. Abdominal:      General: Bowel sounds are normal. There is no distension. Palpations: Abdomen is soft. Tenderness: There is no abdominal tenderness. There is no rebound. Musculoskeletal:      Right lower leg: No edema. Left lower leg: No edema. Skin:     General: Skin is warm. Findings: No erythema or rash. Neurological:      General: No focal deficit present. Mental Status: He is alert and oriented to person, place, and time. Psychiatric:         Mood and Affect: Mood normal.         Behavior: Behavior normal.         An electronic signature was used to authenticate this note.     --Kiara Rsos DO

## 2023-01-17 ENCOUNTER — HOSPITAL ENCOUNTER (OUTPATIENT)
Dept: PHYSICAL THERAPY | Age: 33
Discharge: HOME | End: 2023-01-17

## 2023-01-17 NOTE — PROGRESS NOTES
[place], [address]    Phone: [phone]   Fax: [phone]    Isokinetic Strength Testing Results Summary  Kneeatient Name:  Miriam Benoit    :  1990  MRN: [id number]  Restrictions/Precautions:   Medical/Treatment Diagnosis Information:    S/P ACL /LME Left  88     Insurance/Certification information:     Physician Information:   Nunu Spear  Pain level: 0/10    Latex Allergy:  [x]NO      []YES  Preferred Language for Healthcare:   [x]English       []other:    Visit # Insurance Allowable Auth required? Date Range   30  6 GAP 30   [x]  Yes  []  No      Pain level:  0/10     SUBJECTIVE:   9 months s/p   Pt reports having some moments without his sleeve where he has noticed some grinding is his knee. Pt purchased some collagen/ glucasomine but hasn't used much yet. Jogging up to 1 mile w/ no issues and lifting. Jogging on TM with speed up to 7. 5mph         OBJECTIVE:  Observation:   Test measurements:    3/15: steristrips intact, no drainage or overt s/sx of infection  3/28/22: L knee ext AROM = lacking 5 at beginning of session, 0 with PROM/OP; L knee flex AAROM = 113  22: L knee flex AAROM = 124 (w/wall slides)  22: L knee ext AROM = lacking 1 at rest, 0 with QS; flex AAROM = 135 (w/wall slides)  LEFS = 46/80 = 42% disability   Less visible distal patellar swelling, fat pad swelling today compared to  session. Improved ambulation with no antalgia, has B varus alignment  5/10 Pt has mild TTP below the patella/ infrapatellar fat pad (hoffas fat pad)     MMT = R knee flexion and extension = 5/5 . MMT L knee flexion and extension = 5-/5     LEFS = 47/80 = 41% disability. Mid patella circumferencal measurements R = 39.25 cm.  L = 41.0 cm.   22:  ROM LEFT RIGHT   Knee ext 0 0   Knee Flex 140 141   Strength (measured in lbs using hand held dynamometer) LEFT RIGHT   HIP Flexors  67.4 76.2   HIP Abductors NT today due to issues with dynamometer NT today due to issues with dynamometer   Knee EXT (quad) 67.0 54.9   Knee Flex (HS) NT today due to issues with dynamometer NT today due to issues with dynamometer     7/19/22 LEFS     53/80  12/20/22  LEFS  65/80  RESTRICTIONS/PRECAUTIONS: s/p L ACLR w/ quad tendon allograft and partial lateral meniscectomy (2/25/22), hx of L ACLR (10 years ago)    Exercises/Interventions:  functional assessment today  with plyometrics/ Y balance/ sit- stand 11/29  Therapeutic Ex  Resistance Sets/sec Reps Notes   Elliptical/ BIKE    TM in reverse 2.4 MPH/ 2% inc 3' fwd/ bwd 6'  5'       1/17   Incline calf stretch  Hamstring stretch EOB  Standing quad stretch  30\" 3x ea 12/20   MH ABD/HIP flex  B 75# 3 10x  7/6                 SLR +  30\" 3x    Bridges SL  W/ triple threat SB  15x  7/6                        Squats on airex  2 10 4/ 28                 Leg press  ECC 0/90  SL    110# 3  2 10x  15 1/17   HS curls  SL  60# 2 15x  1/17  burnout sets      Leg ext 90/30 45#35# 2 15 12/20  burnout sets   Wall sits  w/BS   1min 3/4x  almost daily     Therapeutic Activities 10' t educated with RT gym with plan for strengthening, joint protection/ ROM limitations with leg extensions etc. 9/27   Forward step ups with R LE flexion 6\" 1 15    Objective measures  10'   12/20 [employer] cord circuit  4D fwd/ rev & side SLS w/ lateral stepping at end  15\" 4x 8/17   4 square jumping B 1-2,1-4,4-2 3-1,  cw/ ccw   Scissor lunge/  jumping      Small dist 10\"  ea 9/ 14  HEP   x10 1/17   SLS cone pick ups from 8\" step 5 cones 1 2x    [facility] high low  2 10    6\" step up/ hop down   12\"/ /24\" step up B down     SL hop ups and SL hop downs on smaller steps  x10 1/17   Dynamic warmup   ladders     4\" / 6\"  SL up/ SL down   Dup/ S dowm fwd to SLS/ plyo and take off explosion fwd & side X5  3 D planeSled push/ pull 135# 2 laps   Pull 1 lap 1/17   Scissor jumps 2x 10 1/17   opping vo DL/SL FWD 2x  Lateral x 10 SL 1/17 NMR re-education 25'       LSD   4\" 2 10 HEP    Walk/ jog  2.5 - 3.5 mph  W/ brace  9/ 14   SLS w/ bosu pertubations  In multi planes 10x in row  Wall jumps   Tucks jumps  15\" 3x  x15   1/17   Lateral stepping  Monster walks Wine + blue 1 3 laps 7/6   RDL's w/ 10#KB  2 10 7/6    SIT/STAND Left only 23\" from floor  2 15x 8/17   Retro and lateral slider lunges 10# KB 1 15x 5/ 10    Bosu lunges fwd  1 15x 5/ 10   Ladder drills 3/4 speed 5'   All planes 11/9   6\"/  12/ 18\"   B/ SL  Aerex SLS  5x  9/14   3/4 squats with TRX bands   1 10      TRX with SL squat to chair + aerex for balance  1 15x 6/8   Quarter squats with TRX bands   1 10     Squats w/ landmine    Deadlift from floor  Bar + 20# 2 10xea 8/17            Objective:12/20    Bilateral Difference:  Quadricep 180 deg/sec: 23% [x] Deficit   [] Surplus 300 deg/sec: 5.4% [x] Deficit   [] Surplus   Hamstring 180 deg/sec: 12.9% [x] Deficit   [] Surplus 300 deg/sec: % [x] Deficit   1.4 Surplus     Normative Data, 180 degrees/second:  Quadricep Normal:  65% Patient: 56%   Hamstring Normal:  45% Patient: 47.9%     Normative Data, 300 degrees/second:  Quadricep Normal: 50%   Patient: 43.4%   Hamstring Normal: 40% Patient: 36.9%     11/29  FUNCTIONAL TESTING      Y balance Test Left Right Deficit   Fwd 77 82 6%   Retro Lateral 108 112 6%   Retro cross-behind 124 119 +4%   90 CM RT Limb length in         6 m timed hop 1:96 1:78 9%   Single Leg Squat testing 1 min.   Left Right  Deficit    24 25 4%         Single Leg Hop for Distance  Left  Right Deficit    1 m 85\" 185 1 m 86\"  186 1%               Triple Hop for distance  Left Right Deficit    4 m 90\"  490 5m 27\"  527 7%   Crossover hops  4 m 29\"  429 4 m 89\"  489 12%   11/29/ 22 [place] scale of Kinesiophobia        35 final score     EXERCISES   Plyometrics   6\" 2up/ 1 down 12\" + 18\"B up/down   x8   6\" SL/ SL 5x  10/19   Landmine+ 45# X1 5 B squat and lunge 10/19 Goals:      Charges:  Timed Code Treatment Minutes: 60   Total Treatment Minutes: 60     [] EVAL  [x] JK(21538) x 1  [] IONTO  [] NMR (00979) x     [] VASO  [] Manual (82477) x       [x] Other: Physical Performance Test (43096) x   2  [x] TA x 1     [] Mech Traction (39552)  [] ES(attended) (28646)      [] ES (un) (28171): The findings of this test would result with the following summary and recommendations:  Pt tolerated isokinetic testing well. Deficits throughout compared to uninvolved and expected ranges. Return to Play:    []  Stage 1: Intro to Strength   []  Stage 2: progress strength but no active jogging yet   []  Stage 3: Return to Jump and Strength   []  Stage 4: Dynamic Strength and Agility   [x]  Stage 5: Sport Specific Training     []  Ready to Return to Play, Meets All Above Stages   [x]  Not Ready for Return to Sports   Comments:         Sincerely,  Basil Galvez, PTA/ ATC      1/17/2023                   Therapeutic Exercise and NMR EXR  [x] [phone]) Provided verbal/tactile cueing for activities related to strengthening, flexibility, endurance, ROM for improvements in LE, proximal hip, and core control with self care, mobility, lifting, ambulation.  [] (59902) Provided verbal/tactile cueing for activities related to improving balance, coordination, kinesthetic sense, posture, motor skill, proprioception  to assist with LE, proximal hip, and core control in self care, mobility, lifting, ambulation and eccentric single leg control.      NMR and Therapeutic Activities:    [] (27692 or 21504) Provided verbal/tactile cueing for activities related to improving balance, coordination, kinesthetic sense, posture, motor skill, proprioception and motor activation to allow for proper function of core, proximal hip and LE with self care and ADLs  [] (45056) Gait Re-education- Provided training and instruction to the patient for proper LE, core and proximal hip recruitment and positioning and eccentric body weight control with ambulation re-education including up and down stairs     Home Exercise Program:  12/20 see above for suggestions for GYM program   [x] [phone]) Reviewed/Progressed HEP activities related to strengthening, flexibility, endurance, ROM of core, proximal hip and LE for functional self-care, mobility, lifting and ambulation/stair navigation   [] (69488)Reviewed/Progressed HEP activities related to improving balance, coordination, kinesthetic sense, posture, motor skill, proprioception of core, proximal hip and LE for self care, mobility, lifting, and ambulation/stair navigation      Manual Treatments:  PROM / STM / Oscillations-Mobs:  G-I, II, III, IV (PA's, Inf., Post.)  [] (03558) Provided manual therapy to mobilize LE, proximal hip and/or LS spine soft tissue/joints for the purpose of modulating pain, promoting relaxation,  increasing ROM, reducing/eliminating soft tissue swelling/inflammation/restriction, improving soft tissue extensibility and allowing for proper ROM for normal function with self care, mobility, lifting and ambulation. 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 Independent in HEP and progression per patient tolerance, in order to prevent re-injury. [] Progressing: [x] Met: [] Not Met: [] Adjusted  2. Patient will have a decrease in pain to facilitate improvement in movement, function, and ADLs as indicated by Functional Deficits. [] Progressing: [x] Met: [] Not Met: [] Adjusted     Long Term Goals: To be achieved in: 8 weeks  1. Disability index score of 25% or less for the LEFS to assist with reaching prior level of function. [x] Progressing: [] Met: [] Not Met: [] Adjusted  2.  Patient will demonstrate increased AROM to WNL to allow for proper joint functioning as indicated by patients Functional Deficits. [] Progressing: [x] Met: [] Not Met: [] Adjusted  3. Patient will demonstrate an increase in Strength to good proximal hip strength and control, within 5lb HHD in LE to allow for proper functional mobility as indicated by patients Functional Deficits. [x] Progressing: [] Met: [] Not Met: [] Adjusted  4. Patient will return to 15+ minutes of ambulation without increased symptoms or restriction to return to PLOF. [] Progressing: [x] Met: [] Not Met: [] Adjusted  5. Patient will tolerate 25+ minutes of sitting without increased symptoms or restriction to return to PLOF. [] Progressing: [x] Met: [] Not Met: [] Adjusted       Progression Towards Functional goals:  [x] Patient is progressing as expected towards functional goals listed. [] Progression is slowed due to complexities listed. [] Progression has been slowed due to co-morbidities. [] Plan just implemented, too soon to assess goals progression  [] Other:       Treatment/Activity Tolerance:  [x] Patient tolerated treatment well [x] Patient limited by fatique  [] Patient limited by pain  [] Patient limited by other medical complications  [x] Other:   challenged with plyometrics/ and landings today related to  decreased CV fitness and left sided weakness with SL lands. Having difficulty with shock absorption on left side creating some pain. Rev TM for 5 minutes increased posterior chain weakness/ hamstring lactic acid moments. Overall Progression Towards Functional goals/ Treatment Progress Update:  [x] Patient is progressing as expected towards functional goals listed. [] Progression is slowed due to complexities/Impairments listed. [] Progression has been slowed due to co-morbidities.   [] Plan just implemented, too soon to assess goals progression <30days   [] Goals require adjustment due to lack of progress  [] Patient is not progressing as expected and requires additional follow up with physician  [] Other    Prognosis for POC: [x] Good [] Fair  [] Poor    Advised to continue w/ pt workouts, push cardio and left quad power while monitoring lateral joint discomfort. [x] Continue per plan of care [] Alter current plan (see comments)  [] Plan of care initiated [] Hold pending MD visit [] Discharge    Electronically signed by: Doug Charlton, [id number], ATC    Note: If patient does not return for scheduled/recommended follow up visits, this note will serve as a discharge from care along with the most recent update on progress.

## 2023-01-19 DIAGNOSIS — H66.90 ACUTE OTITIS MEDIA, UNSPECIFIED OTITIS MEDIA TYPE: Primary | ICD-10-CM

## 2023-01-19 DIAGNOSIS — H92.03 EAR PAIN, BILATERAL: ICD-10-CM

## 2023-01-19 RX ORDER — AMOXICILLIN AND CLAVULANATE POTASSIUM 875; 125 MG/1; MG/1
1 TABLET, FILM COATED ORAL 2 TIMES DAILY
Qty: 14 TABLET | Refills: 0 | Status: SHIPPED | OUTPATIENT
Start: 2023-01-19 | End: 2023-01-26

## 2023-01-27 ENCOUNTER — OFFICE VISIT (OUTPATIENT)
Dept: ENT CLINIC | Age: 33
End: 2023-01-27

## 2023-01-27 VITALS
SYSTOLIC BLOOD PRESSURE: 137 MMHG | DIASTOLIC BLOOD PRESSURE: 81 MMHG | TEMPERATURE: 98.1 F | HEART RATE: 87 BPM | OXYGEN SATURATION: 97 % | BODY MASS INDEX: 35.46 KG/M2 | WEIGHT: 234 LBS | HEIGHT: 68 IN

## 2023-01-27 DIAGNOSIS — H91.91 HEARING LOSS OF RIGHT EAR, UNSPECIFIED HEARING LOSS TYPE: Primary | ICD-10-CM

## 2023-01-27 DIAGNOSIS — H92.01 OTALGIA, RIGHT: ICD-10-CM

## 2023-01-27 ASSESSMENT — ENCOUNTER SYMPTOMS
STRIDOR: 0
BLOOD IN STOOL: 0
CONSTIPATION: 0
FACIAL SWELLING: 0
TROUBLE SWALLOWING: 0
VOICE CHANGE: 0
CHOKING: 0
VOMITING: 0
EYE ITCHING: 0
SINUS PRESSURE: 0
NAUSEA: 0
WHEEZING: 0
SORE THROAT: 0
COUGH: 0
EYE DISCHARGE: 0
SINUS PAIN: 0
COLOR CHANGE: 0
SHORTNESS OF BREATH: 0
PHOTOPHOBIA: 0
BACK PAIN: 0
RHINORRHEA: 0
DIARRHEA: 0

## 2023-01-27 NOTE — PROGRESS NOTES
[facility]  [address], [address] [facility], [address]  P: [phone]  F: [phone]       Patient     Anu Mckinney  1990    ChiefComplaint     Chief Complaint   Patient presents with    New Patient     Patient is here today because 2 months ago he was on vacation got water in his right ear and since then his right ear has not been the same, it has a muffled sound, he has been on 2 rounds of antibiotics and ear drops from the [facility]       History of Present Illness     Anu Mckinney is a pleasant 33 y.o. male who presents as a new patient for right-sided muffled hearing, ear pressure and pain. This began while swimming on a cruise in early December. Once he returned, he went to the urgent care and his PCP. He was placed on multiple rounds of otic topical antibiotic drops, amoxicillin and a oral course of prednisone. He states that over this time, the symptoms have somewhat improved, but he still has some persistent sensation of the hearing being off in the right ear as well as pressure in the right ear. Denies any difficulty equalizing the pressure of the right ear. Denies tinnitus, room spinning sensation. Denies otorrhea. Denies prior history of ear infections or ear surgeries. Denies family history of ear problems.     Past Medical History     Past Medical History:   Diagnosis Date    Chronic allergic rhinitis 5/30/2018    COVID-19     12/2020    GERD without esophagitis 5/30/2018    Sleep apnea     uses cpap machine       Past Surgical History     Past Surgical History:   Procedure Laterality Date    ARM SURGERY  2005    ELBOW SURGERY Right     KNEE SURGERY Left     KNEE SURGERY Left 2/25/2022    LEFT KNEE ARTHROSCOPY, ANTERIOR CRUCIATE LIGAMENT RECONSTRUCTION WITH QUADRICEPS TENDON ALLOGRAFT, PARTIAL LATERAL MENISCECTOMY performed by Mi Rodriguez MD at [address]  11/17/2016    bx       Family History Family History   Problem Relation Age of Onset    Heart Disease Father     High Blood Pressure Father     Glaucoma Sister     Cancer Maternal Grandfather        Social History     Social History     Socioeconomic History    Marital status: Single     Spouse name: Not on file    Number of children: Not on file    Years of education: Not on file    Highest education level: Not on file   Occupational History    Not on file   Tobacco Use    Smoking status: Never    Smokeless tobacco: Never   Vaping Use    Vaping Use: Never used   Substance and Sexual Activity    Alcohol use: Yes     Alcohol/week: 2.0 standard drinks     Types: 2 Cans of beer per week     Comment: occassionally    Drug use: Never    Sexual activity: Never   Other Topics Concern    Not on file   Social History Narrative    Not on file     Social Determinants of Health     Financial Resource Strain: Low Risk     Difficulty of Paying Living Expenses: Not hard at all   Food Insecurity: No Food Insecurity    Worried About Running Out of Food in the Last Year: Never true    Ran Out of Food in the Last Year: Never true   Transportation Needs: Not on file   Physical Activity: Not on file   Stress: Not on file   Social Connections: Not on file   Intimate Partner Violence: Not on file   Housing Stability: Not on file       Allergies     Allergies   Allergen Reactions    Seasonal        Medications     Current Outpatient Medications   Medication Sig Dispense Refill    fluticasone (FLONASE) 50 MCG/ACT nasal spray 2 sprays by Each Nostril route every evening      Fexofenadine HCl (ALLEGRA PO) Take by mouth daily as needed       famotidine (PEPCID) 20 MG tablet Take 20 mg by mouth 2 times daily       No current facility-administered medications for this visit. Review of Systems     Review of Systems   Constitutional:  Negative for activity change, appetite change, chills, diaphoresis, fatigue, fever and unexpected weight change.    HENT:  Positive for ear pain and hearing loss. Negative for congestion, dental problem, drooling, ear discharge, facial swelling, mouth sores, nosebleeds, postnasal drip, rhinorrhea, sinus pressure, sinus pain, sneezing, sore throat, tinnitus, trouble swallowing and voice change. Eyes:  Negative for photophobia, discharge, itching and visual disturbance. Respiratory:  Negative for cough, choking, shortness of breath, wheezing and stridor. Gastrointestinal:  Negative for blood in stool, constipation, diarrhea, nausea and vomiting. Endocrine: Negative for cold intolerance, heat intolerance, polyphagia and polyuria. Musculoskeletal:  Negative for back pain, gait problem, neck pain and neck stiffness. Skin:  Negative for color change, pallor, rash and wound. Neurological:  Negative for dizziness, syncope, facial asymmetry, speech difficulty, light-headedness, numbness and headaches. Hematological:  Negative for adenopathy. Does not bruise/bleed easily. Psychiatric/Behavioral:  Negative for agitation, confusion and sleep disturbance. PhysicalExam     Vitals:    01/27/23 1002   BP: 137/81   Pulse: 87   Temp: 98.1 °F (36.7 °C)   SpO2: 97%       Physical Exam  Constitutional:       Appearance: He is well-developed. HENT:      Head: Normocephalic and atraumatic. Not macrocephalic and not microcephalic. No raccoon eyes, Romero's sign, abrasion, contusion, right periorbital erythema, left periorbital erythema or laceration. Hair is normal.      Jaw: No trismus. Right Ear: Hearing, tympanic membrane and external ear normal. No decreased hearing noted. No drainage, swelling or tenderness. No middle ear effusion. No mastoid tenderness. Tympanic membrane is not perforated, retracted or bulging. Tympanic membrane has normal mobility. Left Ear: Hearing, tympanic membrane and external ear normal. No decreased hearing noted. No drainage, swelling or tenderness. No middle ear effusion. No mastoid tenderness.  Tympanic membrane is not perforated, retracted or bulging. Tympanic membrane has normal mobility. Ears:      Chavarria exam findings: Does not lateralize. Right Rinne: AC > BC. Left Rinne: AC > BC. Nose: No nasal deformity, septal deviation, laceration, mucosal edema or rhinorrhea. Right Sinus: No maxillary sinus tenderness or frontal sinus tenderness. Left Sinus: No maxillary sinus tenderness or frontal sinus tenderness. Mouth/Throat:      Mouth: Mucous membranes are not pale, not dry and not cyanotic. No lacerations or oral lesions. Dentition: Normal dentition. No dental caries or dental abscesses. Pharynx: Uvula midline. No oropharyngeal exudate, posterior oropharyngeal erythema or uvula swelling. Tonsils: No tonsillar abscesses. Eyes:      General: Lids are normal.         Right eye: No discharge. Left eye: No discharge. Extraocular Movements:      Right eye: Normal extraocular motion and no nystagmus. Left eye: Normal extraocular motion and no nystagmus. Conjunctiva/sclera:      Right eye: No chemosis or exudate. Left eye: No chemosis or exudate. Neck:      Thyroid: No thyroid mass or thyromegaly. Vascular: Normal carotid pulses. Trachea: No tracheal tenderness or tracheal deviation. Cardiovascular:      Rate and Rhythm: Normal rate and regular rhythm. Pulmonary:      Effort: No tachypnea, bradypnea or respiratory distress. Breath sounds: No stridor. Musculoskeletal:      Right shoulder: Normal range of motion. Cervical back: Neck supple. Lymphadenopathy:      Head:      Right side of head: No submental, submandibular, tonsillar, preauricular, posterior auricular or occipital adenopathy. Left side of head: No submental, submandibular, tonsillar, preauricular, posterior auricular or occipital adenopathy. Cervical: No cervical adenopathy.       Right cervical: No superficial, deep or posterior cervical adenopathy. Left cervical: No superficial, deep or posterior cervical adenopathy. Skin:     General: Skin is warm and dry. Findings: No bruising, erythema, laceration, lesion or rash. Neurological:      Mental Status: He is alert and oriented to person, place, and time. Cranial Nerves: No cranial nerve deficit. Psychiatric:         Speech: Speech normal.         Behavior: Behavior normal.         Procedure     Otomicroscopy    An operating microscope was utilized to visualize the external auditory canals using a 4mm speculum. The external auditory canals are clear. The tympanic membrane is intact. There is an area on the right tympanic membrane that appears to be a healing perforation. Ossicles appear intact. No fluid visualized in the middle ear. Assessment and Plan     1. Hearing loss of right ear, unspecified hearing loss type  Etiology the patient's symptoms is not entirely clear. He may have had a right-sided tympanic membrane perforation that is now healing. There could have been some middle ear effusion that is now resolving. I do not appreciate any effusion on exam.  I recommend an audiogram to evaluate the nature of the symptoms. We will contact him with results. - [id number] [employer] [facility],[address], [place]. D., Audiology, [facility]    2. Otalgia, right      Return for audiogram.      [ ] Review/order radiology tests   [ ] Independent interpretation of diagnostic test by another provider  [ ] Discussed case with another provider  [ ] High risk of loss of major body function  [ ] Elective major surgery with risk factors    Portions of this note were dictated using Dragon.  There may be linguistic errors secondary to the use of this program.

## 2023-02-09 ENCOUNTER — HOSPITAL ENCOUNTER (OUTPATIENT)
Dept: PHYSICAL THERAPY | Age: 33
Discharge: HOME | End: 2023-02-09

## 2023-02-09 NOTE — PROGRESS NOTES
Physical Therapy    [place], [facility]    Physical Therapy  Cancellation/No-show Note  Patient Name:  Namita Jade  :  1990   Date:  2023  Cancelled visits to date: 1    Gap session  No-shows to date: 0    For today's appointment patient:  [x]  Cancelled  []  Rescheduled appointment  []  No-show     Reason given by patient:  []  Patient ill  []  Conflicting appointment  []  No transportation    [x]  Conflict with work  []  No reason given  []  Other:     Comments:      Phone call information:   []  Phone call made today to patient at _ time at number provided:      []  Patient answered, conversation as follows:    []  Patient did not answer, message left as follows:  []  Phone call not made today  [x]  Phone call not needed - pt contacted us to cancel and provided reason for cancellation.      Electronically signed by:  Fabiola Payne, [id number]

## 2023-02-14 ENCOUNTER — HOSPITAL ENCOUNTER (OUTPATIENT)
Dept: PHYSICAL THERAPY | Age: 33
Setting detail: THERAPIES SERIES
Discharge: HOME | End: 2023-02-14

## 2023-02-14 PROCEDURE — 9990000027 HC GAP GROUP: Performed by: SPECIALIST/TECHNOLOGIST

## 2023-02-14 NOTE — PROGRESS NOTES
Samantha Olivo    Phone: [phone]   Fax: [phone]    Isokinetic Strength Testing Results Summary  Kneeatient Name:  Kelli Verma    :  1990  MRN: [id number]  Restrictions/Precautions:   Medical/Treatment Diagnosis Information:    S/P ACL /LME Left  98     Insurance/Certification information:     Physician Information:   Jennifer Paulino  Pain level: 0/10    Latex Allergy:  [x]NO      []YES  Preferred Language for Healthcare:   [x]English       []other:    Visit # Insurance Allowable Auth required? Date Range   30  7 GAP 30   [x]  Yes  []  No      Pain level:  0/10     SUBJECTIVE:   9 months s/p   sees Hernando Galvan on . Ran on sun am for about 1 mile. Knees had some soreness  from being in [place] from - Friday from walking concrete floor. Knee is getting stronger with stairs but knows he needs to push himself in the gym. OBJECTIVE:  Observation:   Test measurements:    3/15: steristrips intact, no drainage or overt s/sx of infection  3/28/22: L knee ext AROM = lacking 5 at beginning of session, 0 with PROM/OP; L knee flex AAROM = 113  22: L knee flex AAROM = 124 (w/wall slides)  22: L knee ext AROM = lacking 1 at rest, 0 with QS; flex AAROM = 135 (w/wall slides)  LEFS = 46/80 = 42% disability   Less visible distal patellar swelling, fat pad swelling today compared to  session. Improved ambulation with no antalgia, has B varus alignment  5/10 Pt has mild TTP below the patella/ infrapatellar fat pad (hoffas fat pad)     MMT = R knee flexion and extension = 5/5 . MMT L knee flexion and extension = 5-/5     LEFS = 47/80 = 41% disability. Mid patella circumferencal measurements R = 39.25 cm.  L = 41.0 cm.   22:  ROM LEFT RIGHT   Knee ext 0 0   Knee Flex 140 141   Strength (measured in lbs using hand held dynamometer) LEFT RIGHT   HIP Flexors  67.4 76.2   HIP Abductors NT today due to issues with dynamometer NT today due to issues with dynamometer   Knee EXT (quad) 67.0 54.9   Knee Flex (HS) NT today due to issues with dynamometer NT today due to issues with dynamometer     7/19/22 LEFS     53/80  12/20/22  LEFS  65/80  RESTRICTIONS/PRECAUTIONS: s/p L ACLR w/ quad tendon allograft and partial lateral meniscectomy (2/25/22), hx of L ACLR (10 years ago)    Exercises/Interventions:  functional assessment today  with plyometrics/ Y balance/ sit- stand 11/29  Therapeutic Ex  Resistance Sets/sec Reps Notes   Elliptical/ BIKE    TM in reverse 2.4 MPH/ 2% inc 3' fwd/ bwd 6'  5' 1/17   Incline calf stretch  Hamstring stretch EOB  Standing quad stretch  30\" 3x ea 12/20   MH ABD/HIP flex  B 75# 3 10x  7/6                 SLR +  30\" 3x    Bridges SL  W/ triple threat SB  15x  7/6                        Squats on airex  2 10 4/ 28                 Leg press  ECC 0/90  SL    110# 3  2 10x  15 1/17   HS curls  SL  60# 2 15x  1/17  burnout sets      Leg ext 90/30 45#35# 2 15 12/20  burnout sets   Wall sits  w/BS   1min 3/4x  almost daily     Therapeutic Activities 10' t educated with RT gym with plan for strengthening, joint protection/ ROM limitations with leg extensions etc. 9/27   Forward step ups with R LE flexion 6\" 1 15    Objective measures  10'   12/20 [employer] cord circuit  4D fwd/ rev & side SLS w/ lateral stepping at end  15\" 4x 8/17   4 square jumping B 1-2,1-4,4-2 3-1,  cw/ ccw   Scissor lunge/  jumping      Small dist 10\"  ea 9/ 14  HEP   x10 1/17   SLS cone pick ups from 8\" step 5 cones 1 2x    [facility] squat off high low  2 10    6\" step up/ hop down   12\"/ /24\" step up B down     SL hop ups and SL hop downs on smaller steps  x10 1/17   Dynamic warmup   ladders     4\" / 6\"  SL up/ SL down   Dup/ S dowm fwd to SLS/ plyo and take off explosion fwd & side X5  3 D planeSled push/ pull 135# 2 laps   Pull 1 lap 1/17   Scissor jumps 2x 10 1/17   opping vo DL/SL FWD 2x  Lateral x 10 SL 1/17   NMR re-education 25'       LSD   4\" 2 10 HEP    Walk/ jog  2.5 - 3.5 mph  W/ brace  9/ 14   SLS w/ bosu pertubations  In multi planes 10x in row  Wall jumps   Tucks jumps  15\" 3x  x15   1/17   Lateral stepping  Monster walks Wine + blue 1 3 laps 7/6   RDL's w/ 10#KB  2 10 7/6    SIT/STAND Left only 23\" from floor  2 15x 8/17   Retro and lateral slider lunges 10# KB 1 15x 5/ 10    Bosu lunges fwd  1 15x 5/ 10   Ladder drills 3/4 speed 5'   All planes 11/9   6\"/  12/ 18\"   B/ SL  Aerex SLS  5x  9/14   3/4 squats with TRX bands   1 10      TRX with SL squat to chair + aerex for balance  1 15x 6/8   Quarter squats with TRX bands   1 10     Squats w/ landmine    Deadlift from floor  Bar + 20# 2 10xea 8/17            Objective:12/20    Bilateral Difference:  Quadricep 180 deg/sec: 23% [x] Deficit   [] Surplus 300 deg/sec: 5.4% [x] Deficit   [] Surplus   Hamstring 180 deg/sec: 12.9% [x] Deficit   [] Surplus 300 deg/sec: % [x] Deficit   1.4 Surplus     Normative Data, 180 degrees/second:  Quadricep Normal:  65% Patient: 56%   Hamstring Normal:  45% Patient: 47.9%     Normative Data, 300 degrees/second:  Quadricep Normal: 50%   Patient: 43.4%   Hamstring Normal: 40% Patient: 36.9%     11/29  FUNCTIONAL TESTING      Y balance Test Left Right Deficit   Fwd 77 82 6%   Retro Lateral 108 112 6%   Retro cross-behind 124 119 +4%   90 CM RT Limb length in         6 m timed hop 1:96 1:78 9%   Single Leg Squat testing 1 min.   Left Right  Deficit    24 25 4%         Single Leg Hop for Distance  Left  Right Deficit    1 m 85\" 185 1 m 86\"  186 1%               Triple Hop for distance  Left Right Deficit    4 m 90\"  490 5m 27\"  527 7%   Crossover hops  4 m 29\"  429 4 m 89\"  489 12%   11/29/ 22 [place] scale of Kinesiophobia        35 final score     EXERCISES   Plyometrics   6\" 2up/ 1 down 12\" + 18\"B up/down   x8   6\" SL/ SL 5x  10/19   Landmine+ 45# X1 5 B squat and lunge 10/19                                     Goals:      Charges:  Timed Code Treatment Minutes: 60   Total Treatment Minutes: 60     [] EVAL  [x] RD(28643) x 1  [] IONTO  [] NMR (36147) x     [] VASO  [] Manual (33756) x       [x] Other: Physical Performance Test (20455) x   2  [x] TA x 1     [] Mech Traction (70123)  [] ES(attended) (89713)      [] ES (un) (34108): The findings of this test would result with the following summary and recommendations:  Pt tolerated isokinetic testing well. Deficits throughout compared to uninvolved and expected ranges. Return to Play:    []  Stage 1: Intro to Strength   []  Stage 2: progress strength but no active jogging yet   []  Stage 3: Return to Jump and Strength   []  Stage 4: Dynamic Strength and Agility   [x]  Stage 5: Sport Specific Training     []  Ready to Return to Play, Meets All Above Stages   [x]  Not Ready for Return to Sports   Comments:         Sincerely,  Rachel Graves, PTA/ ATC      2/14/2023                   Therapeutic Exercise and NMR EXR  [x] [phone]) Provided verbal/tactile cueing for activities related to strengthening, flexibility, endurance, ROM for improvements in LE, proximal hip, and core control with self care, mobility, lifting, ambulation.  [] (21762) Provided verbal/tactile cueing for activities related to improving balance, coordination, kinesthetic sense, posture, motor skill, proprioception  to assist with LE, proximal hip, and core control in self care, mobility, lifting, ambulation and eccentric single leg control.      NMR and Therapeutic Activities:    [] (69923 or 71915) Provided verbal/tactile cueing for activities related to improving balance, coordination, kinesthetic sense, posture, motor skill, proprioception and motor activation to allow for proper function of core, proximal hip and LE with self care and ADLs  [] (50392) Gait Re-education- Provided training and instruction to the patient for proper LE, core and proximal hip recruitment and positioning and eccentric body weight control with ambulation re-education including up and down stairs     Home Exercise Program:  12/20 see above for suggestions for GYM program   [x] ([id number]) Reviewed/Progressed HEP activities related to strengthening, flexibility, endurance, ROM of core, proximal hip and LE for functional self-care, mobility, lifting and ambulation/stair navigation   [] (69995)Reviewed/Progressed HEP activities related to improving balance, coordination, kinesthetic sense, posture, motor skill, proprioception of core, proximal hip and LE for self care, mobility, lifting, and ambulation/stair navigation      Manual Treatments:  PROM / STM / Oscillations-Mobs:  G-I, II, III, IV (PA's, Inf., Post.)  [] (54888) Provided manual therapy to mobilize LE, proximal hip and/or LS spine soft tissue/joints for the purpose of modulating pain, promoting relaxation,  increasing ROM, reducing/eliminating soft tissue swelling/inflammation/restriction, improving soft tissue extensibility and allowing for proper ROM for normal function with self care, mobility, lifting and ambulation. 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 Independent in HEP and progression per patient tolerance, in order to prevent re-injury. [] Progressing: [x] Met: [] Not Met: [] Adjusted  2. Patient will have a decrease in pain to facilitate improvement in movement, function, and ADLs as indicated by Functional Deficits. [] Progressing: [x] Met: [] Not Met: [] Adjusted     Long Term Goals: To be achieved in: 8 weeks  1. Disability index score of 25% or less for the LEFS to assist with reaching prior level of function. [x] Progressing: [] Met: [] Not Met: [] Adjusted  2. Patient will demonstrate increased AROM to WNL to allow for proper joint functioning as indicated by patients Functional Deficits.    [] Progressing: [x] Met: [] Not Met: [] Adjusted  3. Patient will demonstrate an increase in Strength to good proximal hip strength and control, within 5lb HHD in LE to allow for proper functional mobility as indicated by patients Functional Deficits. [x] Progressing: [] Met: [] Not Met: [] Adjusted  4. Patient will return to 15+ minutes of ambulation without increased symptoms or restriction to return to PLOF. [] Progressing: [x] Met: [] Not Met: [] Adjusted  5. Patient will tolerate 25+ minutes of sitting without increased symptoms or restriction to return to PLOF. [] Progressing: [x] Met: [] Not Met: [] Adjusted       Progression Towards Functional goals:  [x] Patient is progressing as expected towards functional goals listed. [] Progression is slowed due to complexities listed. [] Progression has been slowed due to co-morbidities. [] Plan just implemented, too soon to assess goals progression  [] Other:       Treatment/Activity Tolerance:  [x] Patient tolerated treatment well [x] Patient limited by fatique  [] Patient limited by pain  [] Patient limited by other medical complications  [x] Other:   challenged with plyometrics/ and landings today related to  decreased CV fitness and left sided weakness with SL lands. Supersetting program  today with squatting/ lunges/ FSU up 18\" step down. Wanted to drive off his Rt leg to assist. Still having issues with foot/arch pain. Overall Progression Towards Functional goals/ Treatment Progress Update:  [x] Patient is progressing as expected towards functional goals listed. [] Progression is slowed due to complexities/Impairments listed. [] Progression has been slowed due to co-morbidities.   [] Plan just implemented, too soon to assess goals progression <30days   [] Goals require adjustment due to lack of progress  [] Patient is not progressing as expected and requires additional follow up with physician  [] Other    Prognosis for POC: [x] Good [] Fair  [] Poor    Advised to continue w/ pt workouts, push cardio and left quad power while monitoring lateral joint discomfort. [x] Continue per plan of care [] Alter current plan (see comments)  [] Plan of care initiated [] Hold pending MD visit [] Discharge    Electronically signed by: Yaya Park, [id number], ATC    Note: If patient does not return for scheduled/recommended follow up visits, this note will serve as a discharge from care along with the most recent update on progress.

## 2023-02-17 ENCOUNTER — OFFICE VISIT (OUTPATIENT)
Dept: ENT CLINIC | Age: 33
End: 2023-02-17

## 2023-02-17 ENCOUNTER — PROCEDURE VISIT (OUTPATIENT)
Dept: AUDIOLOGY | Age: 33
End: 2023-02-17
Payer: COMMERCIAL

## 2023-02-17 VITALS
SYSTOLIC BLOOD PRESSURE: 135 MMHG | TEMPERATURE: 98.8 F | OXYGEN SATURATION: 96 % | DIASTOLIC BLOOD PRESSURE: 93 MMHG | BODY MASS INDEX: 36.37 KG/M2 | HEIGHT: 68 IN | HEART RATE: 82 BPM | WEIGHT: 240 LBS

## 2023-02-17 DIAGNOSIS — H69.81 DYSFUNCTION OF RIGHT EUSTACHIAN TUBE: ICD-10-CM

## 2023-02-17 DIAGNOSIS — H92.01 OTALGIA, RIGHT EAR: Primary | ICD-10-CM

## 2023-02-17 DIAGNOSIS — Z01.10 ENCOUNTER FOR EXAMINATION OF EARS AND HEARING WITHOUT ABNORMAL FINDINGS: ICD-10-CM

## 2023-02-17 DIAGNOSIS — H93.8X1 EAR PRESSURE, RIGHT: ICD-10-CM

## 2023-02-17 DIAGNOSIS — Z01.10 NORMAL HEARING TEST OF BOTH EARS: Primary | ICD-10-CM

## 2023-02-17 DIAGNOSIS — H92.01 OTALGIA, RIGHT: ICD-10-CM

## 2023-02-17 PROCEDURE — 92557 COMPREHENSIVE HEARING TEST: CPT | Performed by: AUDIOLOGIST

## 2023-02-17 PROCEDURE — 92567 TYMPANOMETRY: CPT | Performed by: AUDIOLOGIST

## 2023-02-17 ASSESSMENT — ENCOUNTER SYMPTOMS
EYE REDNESS: 0
EYE PAIN: 0
EYE ITCHING: 0
SINUS PRESSURE: 0
PHOTOPHOBIA: 0
STRIDOR: 0
COUGH: 0
SORE THROAT: 0
NAUSEA: 0
DIARRHEA: 0
CHOKING: 0
RHINORRHEA: 0
SINUS PAIN: 0
TROUBLE SWALLOWING: 0
VOICE CHANGE: 0
SHORTNESS OF BREATH: 0
FACIAL SWELLING: 0
COLOR CHANGE: 0

## 2023-02-17 NOTE — PROGRESS NOTES
[facility]  [address] [address] [facility], [address] [place] [address]  P: [phone]  F: [phone]       Patient     Heather Redmond  1990    ChiefComplaint     Chief Complaint   Patient presents with    Follow-up     Patient is here today for his follow up on his right ear, it seems to have gotten better but there is still a feeling of fullness and a low grade of pain in his right ear       History of Present Illness     Heather Redmond is a pleasant 29 y.o. male here for follow-up for right-sided ear pain and muffled hearing. Audiogram performed the office today reveals essentially normal hearing bilaterally. Slightly worse hearing in the low frequencies on the right. Type a tympanograms. Excellent word recognition scores. Today complains that there is still some mild discomfort in the right ear. He flew last week which did exacerbate the discomfort of the right ear.     Past Medical History     Past Medical History:   Diagnosis Date    Chronic allergic rhinitis 5/30/2018    COVID-19     12/2020    GERD without esophagitis 5/30/2018    Sleep apnea     uses cpap machine       Past Surgical History     Past Surgical History:   Procedure Laterality Date    ARM SURGERY  2005    ELBOW SURGERY Right     KNEE SURGERY Left     KNEE SURGERY Left 2/25/2022    LEFT KNEE ARTHROSCOPY, ANTERIOR CRUCIATE LIGAMENT RECONSTRUCTION WITH QUADRICEPS TENDON ALLOGRAFT, PARTIAL LATERAL MENISCECTOMY performed by Karri Elizabeth MD at [address] [place] [address]  11/17/2016    bx       Family History     Family History   Problem Relation Age of Onset    Heart Disease Father     High Blood Pressure Father     Glaucoma Sister     Cancer Maternal Grandfather        Social History     Social History     Socioeconomic History    Marital status: Single     Spouse name: Not on file    Number of children: Not on file    Years of education: Not on file    Highest education level: Not on file Occupational History    Not on file   Tobacco Use    Smoking status: Never    Smokeless tobacco: Never   Vaping Use    Vaping Use: Never used   Substance and Sexual Activity    Alcohol use: Yes     Alcohol/week: 2.0 standard drinks     Types: 2 Cans of beer per week     Comment: occassionally    Drug use: Never    Sexual activity: Never   Other Topics Concern    Not on file   Social History Narrative    Not on file     Social Determinants of Health     Financial Resource Strain: Low Risk     Difficulty of Paying Living Expenses: Not hard at all   Food Insecurity: No Food Insecurity    Worried About Running Out of Food in the Last Year: Never true    Ran Out of Food in the Last Year: Never true   Transportation Needs: Not on file   Physical Activity: Not on file   Stress: Not on file   Social Connections: Not on file   Intimate Partner Violence: Not on file   Housing Stability: Not on file       Allergies     Allergies   Allergen Reactions    Seasonal        Medications     Current Outpatient Medications   Medication Sig Dispense Refill    fluticasone (FLONASE) 50 MCG/ACT nasal spray 2 sprays by Each Nostril route every evening      Fexofenadine HCl (ALLEGRA PO) Take by mouth daily as needed       famotidine (PEPCID) 20 MG tablet Take 20 mg by mouth 2 times daily       No current facility-administered medications for this visit. Review of Systems     Review of Systems   Constitutional:  Negative for chills, fatigue and fever. HENT:  Positive for ear pain. Negative for congestion, ear discharge, facial swelling, hearing loss, nosebleeds, postnasal drip, rhinorrhea, sinus pressure, sinus pain, sneezing, sore throat, tinnitus, trouble swallowing and voice change. Eyes:  Negative for photophobia, pain, redness, itching and visual disturbance. Respiratory:  Negative for cough, choking, shortness of breath and stridor. Gastrointestinal:  Negative for diarrhea and nausea.    Musculoskeletal:  Negative for neck pain and neck stiffness. Skin:  Negative for color change and rash. Neurological:  Negative for dizziness, facial asymmetry and light-headedness. Hematological:  Negative for adenopathy. Psychiatric/Behavioral:  Negative for agitation and confusion. PhysicalExam     Vitals:    02/17/23 0953   BP: (!) 135/93   Pulse:    Temp:    SpO2:        Physical Exam  Constitutional:       Appearance: He is well-developed. HENT:      Head: Normocephalic and atraumatic. Jaw: No trismus. Right Ear: Tympanic membrane, ear canal and external ear normal. No drainage. No middle ear effusion. Tympanic membrane is not perforated. Left Ear: Tympanic membrane, ear canal and external ear normal. No drainage. No middle ear effusion. Tympanic membrane is not perforated. Nose: No septal deviation, mucosal edema or rhinorrhea. Mouth/Throat:      Dentition: Normal dentition. Pharynx: Uvula midline. No oropharyngeal exudate. Eyes:      General: No scleral icterus. Right eye: No discharge. Left eye: No discharge. Pupils: Pupils are equal, round, and reactive to light. Neck:      Thyroid: No thyromegaly. Trachea: Phonation normal. No tracheal deviation. Pulmonary:      Effort: Pulmonary effort is normal. No respiratory distress. Breath sounds: No stridor. Musculoskeletal:      Cervical back: Neck supple. Lymphadenopathy:      Cervical: No cervical adenopathy. Skin:     General: Skin is warm and dry. Neurological:      Mental Status: He is alert and oriented to person, place, and time. Cranial Nerves: No cranial nerve deficit. Psychiatric:         Behavior: Behavior normal.         Procedure           Assessment and Plan     1. Normal hearing test of both ears  Audiogram reveals essentially normal hearing, type a tympanograms and excellent word recognition scores. The tympanogram of the right ear is slightly more negative than the left.   This could be the origin of his persistent symptoms. I recommend continuing Flonase for 1 month. Try Afrin for couple days. If symptoms persist or not improving after 1 month, recommend following up for right PE tube placement. 2. Otalgia, right      3. Dysfunction of right eustachian tube      Return if symptoms worsen or fail to improve. [ ] Review/order radiology tests   [ ] Independent interpretation of diagnostic test by another provider  [ ] Discussed case with another provider  [ ] High risk of loss of major body function  [ ] Elective major surgery with risk factors    Portions of this note were dictated using Dragon.  There may be linguistic errors secondary to the use of this program.

## 2023-02-17 NOTE — PROGRESS NOTES
Leopoldo Duke   1990, 29 y.o. male   [id number]       Referring Provider: Solange Garner DO  Referral Type: In an order in [facility]    Reason for Visit: Evaluation of suspected change in hearing, tinnitus, or balance. ADULT AUDIOLOGIC EVALUATION      Leopoldo Duke is a 29 y.o. male seen today, 2/17/2023, for an initial audiologic evaluation. AUDIOLOGIC AND OTHER PERTINENT MEDICAL HISTORY:        Leopoldo Duke noted right ear decreased hearing, ear pressure, and ear pain; air travel last week with pain after descent upon return to [place], still has some baseline pain in right ear. Leopoldo Duke denied otorrhea, tinnitus, and dizziness. IMPRESSIONS:       Today's results are consistent with hearing sensitivity within normal limits with normal middle ear function and excellent word recognition for soft conversational speech in both ears. Discussed good communication strategies; follow medicla recommendatoins from Dr. Yissel Dunn. ASSESSMENT AND FINDINGS:       Otoscopy revealed: Clear ear canals bilaterally      RIGHT EAR:  Hearing Sensitivity: Within normal limits. Speech Recognition Threshold: 15 dBHL  Word Recognition: Excellent (100%), based on NU-6 25-word list at 45 dBHL using recorded speech stimuli. Tympanometry: Normal peak pressure and compliance, Type A tympanogram, consistent with normal middle ear function. LEFT EAR:  Hearing Sensitivity: Within normal limits. Speech Recognition Threshold: 10 dBHL  Word Recognition: Excellent (100%), based on NU-6 25-word list at 45 dBHL using recorded speech stimuli. Tympanometry: Normal peak pressure and compliance, Type A tympanogram, consistent with normal middle ear function. Reliability: Good  Transducer: Inserts    See scanned audiogram dated 2/17/2023 for results.       PATIENT EDUCATION:       The following items were discussed with the patient:   - Good Communication Strategies  - Noise-Induced Hearing Loss and use of Hearing Protection Devices (HPDs)     Educational information was shared in the After Visit Summary. RECOMMENDATIONS:                                                                                                                                                                                                                                                                      The following items are recommended based on patient report and results from today's appointment:  - Continue medical follow-up with Raza Underwood DO.  - Retest hearing as medically indicated and/or sooner if a change in hearing is noted. - Utilize \"Good Communication Strategies\" as discussed to assist in speech understanding with communication partners. - Use hearing protection devices (HPDs), such as protective ear muffs and ear plugs, when exposed to dangerous sound levels. [facility] [place], [place]  Audiologist       Chart CC'd to:  Raza Underwood DO      Degree of   Hearing Sensitivity dB Range   Within Normal Limits (WNL) 0 - 20   Mild 20 - 40   Moderate 40 - 55   Moderately-Severe 55 - 70   Severe 70 - 90   Profound 90 +

## 2023-02-17 NOTE — PATIENT INSTRUCTIONS
Good Communication Strategies    Communication can be challenging for anyone, but can be especially difficult for those with some degree of hearing loss. While we may not be able to control every factor that may lead to difficulty with communication, there are Good Communication Strategies that we can all use in our day-to-day lives. Communication takes both parties working together for it to be successful. Tips as a Listener:   Control your environment. It is important to limit the amount of background noise in the room when possible. You should also consider having a good light source in the room to best see the other person. Ask for clarification. Instead of saying \"What?\", you can use parts of what you heard to make a new question. For example, if you heard the word \"Thursday\" but not the rest of the week, you may ask \"What was that about Thursday? \" or \"What did you want to do Thursday? \". This shows the person talking that you are listening and will help them better explain what they are saying. Be an advocate for yourself. If you are hearing but not understanding, tell the other person \"I can hear you, but I need you to slow down when you speak. \"  Or if someone is facing the other direction, say \"I cannot hear you when you are not looking at me when we talk. \"       Tips as a Talker:   - Sit or stand 3 to 6 feet away to maximize audibility         -- It is unrealistic to believe someone else will fully hear your message if you are speaking from across the room or in a different room in the house   - Stay at eye level to help with visual cues   - Make sure you have the persons attention before speaking   - Use facial expressions and gestures to accentuate your message   - Raise your voice slightly (do not scream)   - Speak slowly and distinctly   - Use short, simple sentences   - Rephrase your words if the person is having a hard time understanding you    - To avoid distortion, dont speak directly into a persons ear      Some additional items that may be helpful:   - Remain patient - this is important for both parties   - Write down items that still cannot be heard/understood. You may write with pen/paper or consider typing/texting on a cell phone or smart device. - If background noise is unavoidable, try to keep yourself in a good position in the room. By sitting at a ferrera on the side of the restaurant (preferably a corner), it will be easier to communicate than if you were sitting at a table in the middle with background noise surrounding you. Keep yourself positioned away from music speakers or heavy foot traffic.   - If you have difficulty with the television, consider these options:      -- Use closed-captioning, which is a setting you can turn on that displays the spoken words in a written form on the screen. There may be a slight delay, but this can help fill in missing information. This can be especially helpful when watching programs with accented speech. -- Consider use of a sound bar or speakers that come from the front of the TV. With modern flat screen TVs, many of them have speakers that come out of the back of the device, which makes sound bounce off the wall behind it, then go into the room. Sound bars can allow the sound to go straight in your direction and can improve sound quality. -- Consider ear level devices to help improve the volume and/or sound quality of the program.  There are devices that work like headphones that you can adjust the volume for your ears while others can have the volume at a more comfortable level, such as \"TV Ears\". Most hearing aids have devices that allow them to connect directly to the TV and improve sound quality. Noise-Induced Hearing Loss  What it is, and what you can do to prevent it    Exposure to loud sounds, in an occupational setting or recreational, can cause permanent hearing loss. Sound is measured in decibels (dB). Noise-induced hearing loss is the ONLY type of preventable hearing loss. Hearing loss related to noise exposure can occur at any age. There are small sensory cells, called inner and outer hair cells, within the inner ear (cochlea). These cells process the loudness (intensity) and pitch (frequency) of sound and send the signal to the brain via our auditory nerve (vestibulocochlear nerve, cranial nerve VIII). When these cells are damaged, they can result in permanent hearing loss and/or tinnitus. The hair cells responsible for high frequency sounds, like birds chirping, are most likely to be damaged due to loud sounds. The high frequency sounds are also very important for our clarity and understanding of speech. OCCUPATIONAL NOISE EXPOSURE RECREATIONAL NOISE EXPOSURE   Some jobs may have exposure to loud sounds in the workplace. These jobs may include but are not limited to:  [facility] [address] settings  Manufacturing  Construction  [place]  Landscaping  Hairdressing/hairstyling  [facility]   . .. And more! Many activities outside of work may cause permanent hearing loss. These activities may include but are not limited to:  Lawnmowers, leaf blowers  Farming equipment and animals (such as pigs squealing)  Chainsaws and other power tools  Playing musical instruments and/or singing  Listening to music too loudly - at concerts, through stereo, through ear buds or headphones  Attending sporting events  Attending fireworks shows or using fireworks at home  Use of firearms  . .. And more! REDUCE OR PROTECT YOUR EARS FROM NOISE EXPOSURE    To do your best to avoid noise-induced hearing loss, here are some tips:  Limit exposure to loud sounds. 85 dB (decibels) is safe for 8 hours. As sounds are louder, the length of time the sound is safe lessens. These numbers are cumulative across a 24-hour period.   (NIOSH and CDC, 2002)  85 dB is safe for 8 hours  88 dB is safe for 4 hours  91 dB is safe for 2 hours  94 dB is safe for 1 hour  97 dB is safe for 30 minutes  100 dB is safe for 15 minutes  103 dB is safe for 7.5 minutes  106 dB is safe for 3.75 minutes  109 dB is safe for LESS THAN 2 minutes  112 dB is safe for LESS THAN 1 minute  115 dB is safe for ~ 30 seconds  130 dB can cause IMMEDIATE hearing loss  If you are unsure if a sound is too loud, consider checking the sound level with a \"sound level meter\". There are apps on smart devices, such as \"Decibel X\", that can measure the loudness of the sound. They are not as accurate as expensive equipment used by scientists, but it will give you a guesstimate of how loud the sound is, and if it may be damaging to your hearing. If you cannot avoid loud sounds, here are ways to reduce your exposure:  1. Wear hearing protection  Ear plugs and protective ear muffs can be used to reduce the intensity of the sound. The higher the NRR (noise reduction rating), the better reduction of the intensity of the sound   2. Turn the volume down  When listening to music, turn the volume down, especially when wearing ear buds or headphones. A good rule of thumb is to not go beyond the middle setting on your device. If you can't hear someone talking to you from arm's length away, your music may be at a level that it can cause damage. If someone else can hear your music from 3 feet away, it may also be at a level that it can cause damage. 3. Walk away from the sound  If you do not have the ability to wear hearing protection or turn down the volume of the sound, you should do your best to move away from the source of the sound. Sound decreases in intensity as we move further from the source. The sound will decrease by 6 dB for every doubling of distance from the sound source. TYPES OF HEARING PROTECTION    The most common types of hearing protection are protective ear muffs and ear plugs.   Protective ear muffs are commonly found at home improvement or sporting good stores, they can be worn time and time again and are great if you need to take your hearing protection off frequently. Ear plugs are often made of foam or soft silicone. The foam ones are designed for one-time use, while silicone ear plugs may be used multiple times. There are also \"filtered\" ear plugs that help provide even attenuation of the sound across all frequencies. These are great for listening to music or going to concerts, and allow for better understanding of speech in louder environments. They can be purchased at music stores or online retailers (search \"Ety Plugs\" or \"filtered ear plugs\"), or custom earmolds can be made with an audiologist.    There are \"custom\" hearing protection devices that you can further discuss with your audiologist based on your specific needs, if desired. Exposure to these sounds may cause permanent damage to your hearing.   If you suspect your hearing has changed, it is recommended that you have your hearing tested by your audiologist.

## 2023-02-17 NOTE — Clinical Note
Dr. Mariluz Zuñiga,  Please see note from this patient's audiogram.  Please let me know if there is anything further you need.    Lexi Lowe [address] [place] Audiologist

## 2023-02-23 ENCOUNTER — OFFICE VISIT (OUTPATIENT)
Dept: ORTHOPEDIC SURGERY | Age: 33
End: 2023-02-23
Payer: COMMERCIAL

## 2023-02-23 VITALS — RESPIRATION RATE: 16 BRPM | HEIGHT: 68 IN | WEIGHT: 235.4 LBS | BODY MASS INDEX: 35.68 KG/M2

## 2023-02-23 DIAGNOSIS — S83.512D RUPTURE OF ANTERIOR CRUCIATE LIGAMENT OF LEFT KNEE, SUBSEQUENT ENCOUNTER: Primary | ICD-10-CM

## 2023-02-23 PROCEDURE — G8417 CALC BMI ABV UP PARAM F/U: HCPCS | Performed by: STUDENT IN AN ORGANIZED HEALTH CARE EDUCATION/TRAINING PROGRAM

## 2023-02-23 PROCEDURE — 99213 OFFICE O/P EST LOW 20 MIN: CPT | Performed by: STUDENT IN AN ORGANIZED HEALTH CARE EDUCATION/TRAINING PROGRAM

## 2023-02-23 PROCEDURE — G8427 DOCREV CUR MEDS BY ELIG CLIN: HCPCS | Performed by: STUDENT IN AN ORGANIZED HEALTH CARE EDUCATION/TRAINING PROGRAM

## 2023-02-23 PROCEDURE — G8484 FLU IMMUNIZE NO ADMIN: HCPCS | Performed by: STUDENT IN AN ORGANIZED HEALTH CARE EDUCATION/TRAINING PROGRAM

## 2023-02-23 PROCEDURE — 1036F TOBACCO NON-USER: CPT | Performed by: STUDENT IN AN ORGANIZED HEALTH CARE EDUCATION/TRAINING PROGRAM

## 2023-02-23 NOTE — PROGRESS NOTES
History:  Leopoldo Duke is here for left knee follow up. He had left knee arthroscopic surgery on 2/26/22. Findings at surgery: Left knee anterior cruciate ligament tear and lateral meniscus tear. The patient's pain is rated at 0-1/10. Pain increases to 3-4 after being on it all day. Doing PT at the [facility] office every 2-3 weeks. He still complains of anterior knee pain with going down stairs or prolonged walking. He has noticed non painful crepitus. 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 Physical Examination:  Resp 16   Ht 5' 8\" (1.727 m)   Wt 235 lb 6.4 oz (106.8 kg)   BMI 35.79 kg/m²    Patient is awake, alert, and in no acute distress. No effusion  Left knee ROM 0-120  Negative Lachman test.  No quad atrophy. No significant quad inhibition. Strength is 5/5 in knee flexion and extension         Assessment:   1 year status post left knee arthroscopy, anterior cruciate ligament reconstruction with quadriceps tendon allograft and partial lateral meniscectomy. Patellar tendinitis       Plan:   Continue HEP and strengthening to progress back to sport specific exercise and wrestling. I do think he is having some patellar tendinitis so we discussed a patellar tendon strap and activity modification. Discussed that some of the feeling instability may be fear based since this is his second ACL tear. He states after his last ACL tear it took 2 years to feel better. Ice as needed. NSAIDs as needed. Follow-up in 3 months with Dr. Quentin Baez for evaluation of progress or prn if problems. Imelda Olson PA-C  Board Certified by the [employer] on Certification of [address] and [address] [address]        This note was generated with use of a verbal recognition program [facility]) and was checked for errors. It is possible that there are still dictated errors within this office note.   If so, please bring any errors to my attention for an addendum. All efforts were made to ensure that this office note is accurate.

## 2023-03-07 ENCOUNTER — HOSPITAL ENCOUNTER (OUTPATIENT)
Dept: PHYSICAL THERAPY | Age: 33
Discharge: HOME | End: 2023-03-07

## 2023-03-07 NOTE — PROGRESS NOTES
[facility]    Phone: [phone]   Fax: [phone]    Isokinetic Strength Testing Results Summary  Kneeatient Name:  Johnathan Virk    :  1990  MRN: [id number]  Restrictions/Precautions:   Medical/Treatment Diagnosis Information:    S/P ACL /LME Left       Insurance/Certification information:     Physician Information:   Mitzy Hill  Pain level: 0/10    Latex Allergy:  [x]NO      []YES  Preferred Language for Healthcare:   [x]English       []other:    Visit # Insurance Allowable Auth required? Date Range   30  7 GAP 30   [x]  Yes  []  No      Pain level:  0/10     SUBJECTIVE:   1 year  s/p   saw Emily Eubanks on  but has been traveling a lot for work and didn't have time to get tested for a BIODEX prior. Pt has used a patellar tendon strap which has helped with walking etc. Workouts have been inconsistent but still trying to run or get some strength as able. Has been on his feet a lot recently and due to work demands, has not had the opportunity to do much strength training    Observation:   Test measurements:    3/15: steristrips intact, no drainage or overt s/sx of infection  3/28/22: L knee ext AROM = lacking 5 at beginning of session, 0 with PROM/OP; L knee flex AAROM = 113  22: L knee flex AAROM = 124 (w/wall slides)  22: L knee ext AROM = lacking 1 at rest, 0 with QS; flex AAROM = 135 (w/wall slides)  LEFS = 46/80 = 42% disability   Less visible distal patellar swelling, fat pad swelling today compared to  session. Improved ambulation with no antalgia, has B varus alignment  5/10 Pt has mild TTP below the patella/ infrapatellar fat pad (hoffas fat pad)     MMT = R knee flexion and extension = 5/5 . MMT L knee flexion and extension = 5-/5     LEFS = 47/80 = 41% disability. Mid patella circumferencal measurements R = 39.25 cm.  L = 41.0 cm.   22:  ROM LEFT RIGHT   Knee ext 0 0   Knee Flex 140 141   Strength (measured in lbs using hand held dynamometer) LEFT RIGHT   HIP Flexors  67.4 76.2   HIP Abductors NT today due to issues with dynamometer NT today due to issues with dynamometer   Knee EXT (quad) 67.0 54.9   Knee Flex (HS) NT today due to issues with dynamometer NT today due to issues with dynamometer     7/19/22 LEFS     53/80  12/20/22  LEFS  65/80  RESTRICTIONS/PRECAUTIONS: s/p L ACLR w/ quad tendon allograft and partial lateral meniscectomy (2/25/22), hx of L ACLR (10 years ago)    Exercises/Interventions:  functional assessment today  with plyometrics/ Y balance/ sit- stand 11/29  Therapeutic Ex  Resistance Sets/sec Reps Notes   Elliptical/ BIKE    TM in reverse 2.4 MPH/ 2% inc 3' fwd/ bwd 6'  5'       1/17   Incline calf stretch  Hamstring stretch EOB  Standing quad stretch  30\" 3x ea 12/20   MH ABD/HIP flex  B 75# 3 10x  7/6                 SLR +  30\" 3x    Bridges SL  W/ triple threat SB  15x  7/6                        Squats on airex  2 10 4/ 28                 Leg press  ECC 0/90  SL    110# 3  2 10x  15 1/17   HS curls  SL  60# 2 15x  1/17  burnout sets      Leg ext 90/30 45#35# 2 15 12/20  burnout sets   Wall sits  w/BS   1min 3/4x  almost daily     Therapeutic Activities 10' t educated with RT gym with plan for strengthening, joint protection/ ROM limitations with leg extensions etc. 9/27   Forward step ups with R LE flexion 6\" 1 15    Objective measures  10'   12/20 FittingRoom Sports cord circuit  4D fwd/ rev & side SLS w/ lateral stepping at end  15\" 4x 8/17   4 square jumping B 1-2,1-4,4-2 3-1,  cw/ ccw   Scissor lunge/  jumping      Small dist 10\"  ea 9/ 14  HEP   x10 1/17   SLS cone pick ups from 8\" step 5 cones 1 2x    [place] split squat off high low  2 10    6\" step up/ hop down   12\"/ /24\" step up B down     SL hop ups and SL hop downs on smaller steps  x10 1/17   Dynamic warmup   ladders     4\" / 6\"  SL up/ SL down   Dup/ S dowm fwd to SLS/ plyo and take off explosion fwd & side X5  3 D planeSled push/ pull 135# 2 laps   Pull 1 lap 1/17   Scissor jumps 2x 10 1/17   opping vo DL/SL FWD 2x  Lateral x 10 SL 1/17   NMR re-education 25'       LSD   4\" 2 10 HEP    Walk/ jog  2.5 - 3.5 mph  W/ brace  9/ 14   SLS w/ bosu pertubations  In multi planes 10x in row  Wall jumps   Tucks jumps  15\" 3x  x15   1/17   Lateral stepping  Monster walks Wine + blue 1 3 laps 7/6   RDL's w/ 10#KB  2 10 7/6    SIT/STAND Left only 23\" from floor  2 15x 8/17   Retro and lateral slider lunges 10# KB 1 15x 5/ 10    Bosu lunges fwd  1 15x 5/ 10   Ladder drills 3/4 speed 5'   All planes 11/9   6\"/  12/ 18\"   B/ SL  Aerex SLS  5x  9/14   3/4 squats with TRX bands   1 10      TRX with SL squat to chair + aerex for balance  1 15x 6/8   Quarter squats with TRX bands   1 10     Squats w/ landmine    Deadlift from floor  Bar + 20# 2 10xea 8/17            Objective: BIODEX EXAM    Bilateral Difference:  Quadricep 180 deg/sec: 7.8% [x] Deficit   [] Surplus 300 deg/sec: 12.8% [x] Deficit   [] Surplus   Hamstring 180 deg/sec: 6.9% [x] Deficit   [] Surplus 300 deg/sec:0 % [] Deficit   Surplus     Normative Data, 180 degrees/second:  Quadricep Normal:  65% Patient: 64%   Hamstring Normal:  45% Patient: 49.3%     Normative Data, 300 degrees/second:  Quadricep Normal: 50%   Patient: 41.8%   Hamstring Normal: 40% Patient: 34.5%     11/29  FUNCTIONAL TESTING      Y balance Test Left Right Deficit   Fwd 77 82 6%   Retro Lateral 108 112 6%   Retro cross-behind 124 119 +4%   90 CM RT Limb length in         6 m timed hop 1:96 1:78 9%   Single Leg Squat testing 1 min.  Left Right  Deficit    24 25 4%         Single Leg Hop for Distance  Left  Right Deficit    1 m 85\" 185 1 m 86\"  186 1%               Triple Hop for distance  Left Right Deficit    4 m 90\"  490 5m 27\"  527 7%   Crossover hops  4 m 29\"  429 4 m 89\"  489 12%   11/29/ 22 [place] scale of Kinesiophobia        35 final score     EXERCISES   Plyometrics   6\" 2up/ 1 down 12\" + 18\"B up/down   x8   6\" SL/ SL 5x  10/19   Landmine+ 45# X1 5 B squat and lunge 10/19                                     Goals:  Pt would like to return to jogging and running a marathon or 1/2 marathon if able but would like to return to soccer if able. Pt plans to continue to at gym and jogging on his own with ACL brace/ patellar tendon strap. Will return to [facility] treatment in about 2 weeks and will monitor his patellar tendon discomfort and minimize jumping as a result. [] EVAL  [] PA(07887) x 1  [] IONTO  [] NMR (28979) x     [] VASO  [] Manual (43178) x       [x] Other:   GAP session 4/6   [] TA x 1     [] Mech Traction (41168)  [] ES(attended) (74681)      [] ES (un) (58188): The findings of this test would result with the following summary and recommendations:  Pt tolerated isokinetic testing tolerated well. Gains made since last  Biodex examination since Dec 2022 from last test. No jump test performed due to continued patellar tendon discomfort and his last testing demonstrated less than a 10% deficit with all jump tests including Y balance. Noted patellar tendon pain/discomfort in last 20-30 of left knee TKE with descending stairs and knee flexion. Loading the tendon increases discomfort but also has bilateral various alignment. Return to Play:    []  Stage 1: Intro to Strength   []  Stage 2: progress strength but no active jogging yet   []  Stage 3: Return to Jump and Strength   []  Stage 4: Dynamic Strength and Agility   []  Stage 5: Sport Specific Training     []  Ready to Return to Play, Meets All Above Stages   [x]  Not Ready for Return to Sports   Comments:         Sincerely,  Luz Maria Blood, PTA/ ATC      3/7/2023        Treatment/Activity Tolerance:  [] Patient tolerated treatment well [] Patient limited by fatique  [] Patient limited by pain  [] Patient limited by other medical complications  [] Other:   BIODEX test performed today. Continue with HEP/ gym workouts while jogging as able. Monitor patellar tendon pain and continue using patellar tendon strap and ice cup PRN. Contact DR if symptoms continue. GAP session 4/6. Information from [employer] to be sent to Kirk Dowling for review    Electronically signed by: Suzette Eddy ATC    Note: If patient does not return for scheduled/recommended follow up visits, this note will serve as a discharge from care along with the most recent update on progress.

## 2023-03-28 ENCOUNTER — HOSPITAL ENCOUNTER (OUTPATIENT)
Dept: PHYSICAL THERAPY | Age: 33
Discharge: HOME | End: 2023-03-28

## 2023-03-28 NOTE — PROGRESS NOTES
[facility]    Phone: [phone]   Fax: [phone]    Isokinetic Strength Testing Results Summary  Kneeatient Name:  Salazar Espinoza    :  1990  MRN: [id number]  Restrictions/Precautions:   Medical/Treatment Diagnosis Information:    S/P ACL /LME Left  66     Insurance/Certification information:     Physician Information:   Regis Lubin  Pain level: 0/10    Latex Allergy:  [x]NO      []YES  Preferred Language for Healthcare:   [x]English       []other:    Visit #      46  GAP PKG. Pain level:  0/10     SUBJECTIVE:   1 year  s/p   and knee has been doing well thus far. Missing some workouts with traveling out of town. Has run up to 3 miles/45' w/ walk/ jog.  w/  ACL brace and will wear patellar tendon brace on flat/ level ground. Observation:   Test measurements:    3/15: steristrips intact, no drainage or overt s/sx of infection  3/28/22: L knee ext AROM = lacking 5 at beginning of session, 0 with PROM/OP; L knee flex AAROM = 113  22: L knee flex AAROM = 124 (w/wall slides)  22: L knee ext AROM = lacking 1 at rest, 0 with QS; flex AAROM = 135 (w/wall slides)  LEFS = 46/80 = 42% disability   Less visible distal patellar swelling, fat pad swelling today compared to  session. Improved ambulation with no antalgia, has B varus alignment  10 Pt has mild TTP below the patella/ infrapatellar fat pad (hoffas fat pad)     MMT = R knee flexion and extension = 5/5 . MMT L knee flexion and extension = 5-/5     LEFS = 47/80 = 41% disability. Mid patella circumferencal measurements R = 39.25 cm.  L = 41.0 cm.   22:  ROM LEFT RIGHT   Knee ext 0 0   Knee Flex 140 141   Strength (measured in lbs using hand held dynamometer) LEFT RIGHT   HIP Flexors  67.4 76.2   HIP Abductors NT today due to issues with dynamometer NT today due to issues with dynamometer   Knee EXT (quad) 67.0 54.9   Knee Flex (HS) NT today due to

## 2023-03-31 ENCOUNTER — PROCEDURE VISIT (OUTPATIENT)
Dept: ENT CLINIC | Age: 33
End: 2023-03-31

## 2023-03-31 VITALS
TEMPERATURE: 97.9 F | BODY MASS INDEX: 35.61 KG/M2 | DIASTOLIC BLOOD PRESSURE: 88 MMHG | HEART RATE: 64 BPM | WEIGHT: 235 LBS | HEIGHT: 68 IN | SYSTOLIC BLOOD PRESSURE: 137 MMHG | OXYGEN SATURATION: 96 %

## 2023-03-31 DIAGNOSIS — H69.81 DYSFUNCTION OF RIGHT EUSTACHIAN TUBE: Primary | ICD-10-CM

## 2023-03-31 NOTE — PROGRESS NOTES
PE Tube    Pre op Dx: right-sided ETD  Post Op: Same  Procedure: right-sided Myringotomy with tympanostomy tube placement  Consent: written obtained  Surgeon: Priya Latif DO  Description:  With the use of an operating microscopy and a 4mm speculum, the external auditory canals were examined bilaterally. Any cerumen was removed using instrumentation. The tympanic membranes were visualize bilaterally. Topical phenol was applied to the anterior-inferior quadrant of right-sided tympanic membrane(s). A myringotomy knife was used to make a radial incision in the tympanic membrane(s). A 3-suction was used to suction out fluid from the middle ear space. An alligator forceps was used to place a paparella PE tube in the myringotomy. The patient tolerated the procedure well. There were no complications with the procedure.       Post procedure instructions discussed  Follow-up in 3 weeks

## 2023-04-18 ENCOUNTER — HOSPITAL ENCOUNTER (OUTPATIENT)
Dept: PHYSICAL THERAPY | Age: 33
Discharge: HOME | End: 2023-04-18

## 2023-04-18 NOTE — PROGRESS NOTES
[facility]    Phone: [phone]   Fax: [phone]    Isokinetic Strength Testing Results Summary  Kneeatient Name:  Gage De Santiago    :  1990  MRN: [id number]  Restrictions/Precautions:   Medical/Treatment Diagnosis Information:    S/P ACL /LME Left       Insurance/Certification information:     Physician Information:   Careye Loop  Pain level: 0/10    Latex Allergy:  [x]NO      []YES  Preferred Language for Healthcare:   [x]English       []other:    Visit #      5/6  GAP PKG. Pain level:  0/10     SUBJECTIVE:   14 months  s/p   and knee has been doing well thus far. 3 miles is the furthest he has walked/run. This weekend he jogged for a mile and did some 100 yard sprints w/ ACL brace. He noticed one instance of knee pain when decelerating. He wears a patellar tendon brace with specific activities like lifting. He tried [place] split squats on his own Thursday. He is working out 2-3 days a week. He uses ice PRN, mostly on days he has worked out. Observation:   Test measurements:    3/15: steristrips intact, no drainage or overt s/sx of infection  3/28/22: L knee ext AROM = lacking 5 at beginning of session, 0 with PROM/OP; L knee flex AAROM = 113  22: L knee flex AAROM = 124 (w/wall slides)  22: L knee ext AROM = lacking 1 at rest, 0 with QS; flex AAROM = 135 (w/wall slides)  LEFS = 46/80 = 42% disability   Less visible distal patellar swelling, fat pad swelling today compared to  session. Improved ambulation with no antalgia, has B varus alignment  5/10 Pt has mild TTP below the patella/ infrapatellar fat pad (hoffas fat pad)     MMT = R knee flexion and extension = 5/5 . MMT L knee flexion and extension = 5-/5     LEFS = 47/80 = 41% disability. Mid patella circumferencal measurements R = 39.25 cm.  L = 41.0 cm.   22:  ROM LEFT RIGHT   Knee ext 0 0   Knee Flex 140 141   Strength (measured in lbs

## 2023-05-05 ENCOUNTER — OFFICE VISIT (OUTPATIENT)
Dept: ENT CLINIC | Age: 33
End: 2023-05-05
Payer: COMMERCIAL

## 2023-05-05 VITALS
WEIGHT: 225 LBS | BODY MASS INDEX: 34.1 KG/M2 | DIASTOLIC BLOOD PRESSURE: 77 MMHG | HEART RATE: 73 BPM | HEIGHT: 68 IN | SYSTOLIC BLOOD PRESSURE: 132 MMHG

## 2023-05-05 DIAGNOSIS — Z96.22 STATUS POST MYRINGOTOMY WITH INSERTION OF TUBE: ICD-10-CM

## 2023-05-05 DIAGNOSIS — H69.81 DYSFUNCTION OF RIGHT EUSTACHIAN TUBE: Primary | ICD-10-CM

## 2023-05-05 PROCEDURE — 1036F TOBACCO NON-USER: CPT | Performed by: OTOLARYNGOLOGY

## 2023-05-05 PROCEDURE — G8417 CALC BMI ABV UP PARAM F/U: HCPCS | Performed by: OTOLARYNGOLOGY

## 2023-05-05 PROCEDURE — G8427 DOCREV CUR MEDS BY ELIG CLIN: HCPCS | Performed by: OTOLARYNGOLOGY

## 2023-05-05 PROCEDURE — 99212 OFFICE O/P EST SF 10 MIN: CPT | Performed by: OTOLARYNGOLOGY

## 2023-05-06 NOTE — PROGRESS NOTES
6 weeks following placement of PE tube on the right. Hearing is much improved. No otalgia. No otorrhea  Right tube is in good position. The middle ear space is well aerated. Patient reassured. Follow-up: 4 months.

## 2023-05-09 ENCOUNTER — HOSPITAL ENCOUNTER (OUTPATIENT)
Dept: PHYSICAL THERAPY | Age: 33
Discharge: HOME | End: 2023-05-09

## 2023-05-09 NOTE — PROGRESS NOTES
Samantha Olivo    Phone: [phone]   Fax: [phone]    Isokinetic Strength Testing Results Summary  Kneeatient Name:  John Rodríguez    :  1990  MRN: [id number]  Restrictions/Precautions:   Medical/Treatment Diagnosis Information:    S/P ACL /LME Left  3/35/80     Insurance/Certification information:     Physician Information:   Nehemiah Kemp    Pain level: 0/10    Latex Allergy:  [x]NO      []YES  Preferred Language for Healthcare:   [x]English       []other:    Visit #        GAP PKG. Pain level:  0/10     SUBJECTIVE:   14 months  s/p   and knee has been doing well thus far. Ran the 10K on sat of pig weekend and has been going to gym regularly and lifting legs with some intermittent jogging etc.   OBSERVATION:  Test measurements:    3/15: steristrips intact, no drainage or overt s/sx of infection  3/28/22: L knee ext AROM = lacking 5 at beginning of session, 0 with PROM/OP; L knee flex AAROM = 113  22: L knee flex AAROM = 124 (w/wall slides)  22: L knee ext AROM = lacking 1 at rest, 0 with QS; flex AAROM = 135 (w/wall slides)  LEFS = 46/80 = 42% disability   Less visible distal patellar swelling, fat pad swelling today compared to  session. Improved ambulation with no antalgia, has B varus alignment  10 Pt has mild TTP below the patella/ infrapatellar fat pad (hoffas fat pad)     MMT = R knee flexion and extension = 5/5 . MMT L knee flexion and extension = 5-/5     LEFS = 47/80 = 41% disability. Mid patella circumferencal measurements R = 39.25 cm.  L = 41.0 cm.   22:  ROM LEFT RIGHT   Knee ext 0 0   Knee Flex 140 141   Strength (measured in lbs using hand held dynamometer) LEFT RIGHT   HIP Flexors  67.4 76.2   HIP Abductors NT today due to issues with dynamometer NT today due to issues with dynamometer   Knee EXT (quad) 67.0 54.9   Knee Flex (HS) NT today due to issues with dynamometer NT today due to

## 2023-05-19 ENCOUNTER — HOSPITAL ENCOUNTER (OUTPATIENT)
Dept: PHYSICAL THERAPY | Age: 33
Setting detail: THERAPIES SERIES
Discharge: HOME | End: 2023-05-19
Payer: COMMERCIAL

## 2023-05-19 PROCEDURE — 9990000029 HC GAP PACKAGE: Performed by: SPECIALIST/TECHNOLOGIST

## 2023-05-19 NOTE — PROGRESS NOTES
Samantha Olivo    Phone: [phone]   Fax: [phone]    Isokinetic Strength Testing Results Summary  Kneeatient Name:  Laly Alvarado    :  1990  MRN: [id number]  Restrictions/Precautions:   Medical/Treatment Diagnosis Information:    S/P ACL /LME Left  22     Insurance/Certification information:     Physician Information:   Sherie De La Cruz    Pain level: 0/10    Latex Allergy:  [x]NO      []YES  Preferred Language for Healthcare:   [x]English       []other:    Visit #        GAP PKG.  Plans to purchase new GAP pkg          Pain level:  0/10     SUBJECTIVE:   14 months  s/p   Left knee doing well, sees Dr Sherie De La Cruz . Getting plyometrics and strength training and running also. Left shin pain recently but not sure what its related to. Has noticed since running the pig, has been using the Ice cup     OBSERVATION:  Test measurements:    3/15: steristrips intact, no drainage or overt s/sx of infection  3/28/22: L knee ext AROM = lacking 5 at beginning of session, 0 with PROM/OP; L knee flex AAROM = 113  22: L knee flex AAROM = 124 (w/wall slides)  22: L knee ext AROM = lacking 1 at rest, 0 with QS; flex AAROM = 135 (w/wall slides)  LEFS = 46/80 = 42% disability   Less visible distal patellar swelling, fat pad swelling today compared to  session. Improved ambulation with no antalgia, has B varus alignment  5/10 Pt has mild TTP below the patella/ infrapatellar fat pad (hoffas fat pad)     MMT = R knee flexion and extension = 5/5 . MMT L knee flexion and extension = 5-/5     LEFS = 47/80 = 41% disability. Mid patella circumferencal measurements R = 39.25 cm.  L = 41.0 cm.   22:  ROM LEFT RIGHT   Knee ext 0 0   Knee Flex 140 141   Strength (measured in lbs using hand held dynamometer) LEFT RIGHT   HIP Flexors  67.4 76.2   HIP Abductors NT today due to issues with dynamometer NT today due to issues with dynamometer

## 2023-05-30 ENCOUNTER — OFFICE VISIT (OUTPATIENT)
Dept: ORTHOPEDIC SURGERY | Age: 33
End: 2023-05-30
Payer: COMMERCIAL

## 2023-05-30 VITALS — BODY MASS INDEX: 35.61 KG/M2 | HEIGHT: 68 IN | WEIGHT: 235 LBS | RESPIRATION RATE: 16 BRPM

## 2023-05-30 DIAGNOSIS — S83.512D RUPTURE OF ANTERIOR CRUCIATE LIGAMENT OF LEFT KNEE, SUBSEQUENT ENCOUNTER: Primary | ICD-10-CM

## 2023-05-30 PROCEDURE — G8427 DOCREV CUR MEDS BY ELIG CLIN: HCPCS | Performed by: ORTHOPAEDIC SURGERY

## 2023-05-30 PROCEDURE — 99213 OFFICE O/P EST LOW 20 MIN: CPT | Performed by: ORTHOPAEDIC SURGERY

## 2023-05-30 PROCEDURE — G8417 CALC BMI ABV UP PARAM F/U: HCPCS | Performed by: ORTHOPAEDIC SURGERY

## 2023-05-30 PROCEDURE — 1036F TOBACCO NON-USER: CPT | Performed by: ORTHOPAEDIC SURGERY

## 2023-06-19 ENCOUNTER — OFFICE VISIT (OUTPATIENT)
Dept: PULMONOLOGY | Age: 33
End: 2023-06-19
Payer: COMMERCIAL

## 2023-06-19 VITALS
DIASTOLIC BLOOD PRESSURE: 98 MMHG | HEIGHT: 68 IN | SYSTOLIC BLOOD PRESSURE: 148 MMHG | HEART RATE: 67 BPM | BODY MASS INDEX: 35.61 KG/M2 | OXYGEN SATURATION: 98 % | WEIGHT: 235 LBS

## 2023-06-19 DIAGNOSIS — E66.01 CLASS 2 SEVERE OBESITY DUE TO EXCESS CALORIES WITH SERIOUS COMORBIDITY AND BODY MASS INDEX (BMI) OF 35.0 TO 35.9 IN ADULT (HCC): Chronic | ICD-10-CM

## 2023-06-19 DIAGNOSIS — G47.33 OSA (OBSTRUCTIVE SLEEP APNEA): Chronic | ICD-10-CM

## 2023-06-19 PROBLEM — E66.812 CLASS 2 SEVERE OBESITY DUE TO EXCESS CALORIES WITH SERIOUS COMORBIDITY AND BODY MASS INDEX (BMI) OF 35.0 TO 35.9 IN ADULT: Chronic | Status: ACTIVE | Noted: 2018-05-30

## 2023-06-19 PROCEDURE — G8417 CALC BMI ABV UP PARAM F/U: HCPCS | Performed by: INTERNAL MEDICINE

## 2023-06-19 PROCEDURE — 1036F TOBACCO NON-USER: CPT | Performed by: INTERNAL MEDICINE

## 2023-06-19 PROCEDURE — G8427 DOCREV CUR MEDS BY ELIG CLIN: HCPCS | Performed by: INTERNAL MEDICINE

## 2023-06-19 PROCEDURE — 99214 OFFICE O/P EST MOD 30 MIN: CPT | Performed by: INTERNAL MEDICINE

## 2023-06-19 ASSESSMENT — SLEEP AND FATIGUE QUESTIONNAIRES
HOW LIKELY ARE YOU TO NOD OFF OR FALL ASLEEP WHILE SITTING AND TALKING TO SOMEONE: 0
HOW LIKELY ARE YOU TO NOD OFF OR FALL ASLEEP WHILE LYING DOWN TO REST IN THE AFTERNOON WHEN CIRCUMSTANCES PERMIT: 1
HOW LIKELY ARE YOU TO NOD OFF OR FALL ASLEEP WHILE SITTING QUIETLY AFTER LUNCH WITHOUT ALCOHOL: 1
HOW LIKELY ARE YOU TO NOD OFF OR FALL ASLEEP WHILE SITTING AND READING: 2
HOW LIKELY ARE YOU TO NOD OFF OR FALL ASLEEP WHILE SITTING INACTIVE IN A PUBLIC PLACE: 0
HOW LIKELY ARE YOU TO NOD OFF OR FALL ASLEEP WHILE WATCHING TV: 2
HOW LIKELY ARE YOU TO NOD OFF OR FALL ASLEEP IN A CAR, WHILE STOPPED FOR A FEW MINUTES IN TRAFFIC: 0
ESS TOTAL SCORE: 7
HOW LIKELY ARE YOU TO NOD OFF OR FALL ASLEEP WHEN YOU ARE A PASSENGER IN A CAR FOR AN HOUR WITHOUT A BREAK: 1

## 2023-06-19 NOTE — ASSESSMENT & PLAN NOTE
Chronic-Stable: Reviewed and analyzed results of physiologic download from patient's machine and reviewed with patient. Supplies and parts as needed for his machine. These are medically necessary.   Limit caffeine use after 3pm. Based on the analyzed data will continue with current settings;

## 2023-06-19 NOTE — PROGRESS NOTES
Samia Mai CNP  Noa Rosalexa STONE [address] [facility], [address] [facility]- [phone]   [place] [id number] [address] [phone]     [facility] [address] [address]  Dept: [phone]  Dept Fax: [phone]  Loc: [phone]      Assessment/Plan:      1. JULIAN (obstructive sleep apnea)  Assessment & Plan:  Chronic-Stable: Reviewed and analyzed results of physiologic download from patient's machine and reviewed with patient. Supplies and parts as needed for his machine. These are medically necessary. Limit caffeine use after 3pm. Based on the analyzed data will continue with current settings;   2. Class 2 severe obesity due to excess calories with serious comorbidity and body mass index (BMI) of 35.0 to 35.9 in [facility])  Assessment & Plan:  Chronic-not stable:  Discussed importance of treating obstructive sleep apnea and getting sufficient sleep to assist with weight control. Encouraged him to work on weight loss through diet and exercise. Recommended DASH or Mediterranean diets. Reviewed, analyzed, and documented physiologic data from patient's PAP machine. This information was analyzed to assess complexity and medical decision making in regards to further testing and management. Diagnoses of JULIAN (obstructive sleep apnea) and Class 2 severe obesity due to excess calories with serious comorbidity and body mass index (BMI) of 35.0 to 35.9 in [facility]) were pertinent to this visit. The chronic medical conditions listed are directly related to the primary diagnosis listed above. The management of the primary diagnosis affects the secondary diagnosis and vice versa. Subjective:   Subjective   Patient ID: Sherin Redmond is a 29 y.o. male.     Chief Complaint   Patient presents with    Sleep Apnea

## 2023-07-05 ENCOUNTER — HOSPITAL ENCOUNTER (OUTPATIENT)
Dept: PHYSICAL THERAPY | Age: 33
Discharge: HOME | End: 2023-07-05

## 2023-07-05 DIAGNOSIS — H92.11 OTORRHEA OF RIGHT EAR: Primary | ICD-10-CM

## 2023-07-05 RX ORDER — OFLOXACIN 3 MG/ML
SOLUTION/ DROPS OPHTHALMIC
Qty: 1 EACH | Refills: 0 | Status: SHIPPED | OUTPATIENT
Start: 2023-07-05

## 2023-07-05 NOTE — FLOWSHEET NOTE
plyometrics/  w/ 18\" SL lands. Has some anxiety and weakness with feeling confident with left knee stabilizing himself but also struggles with  ^forward trunk flexion positions at landings and increased knee flexion also vs more upright posture. Continue with HEP/ gym workouts while jogging as able. Monitor  left shin pain and continue need for orthotics for varus alignment issues  GAP session 3/6 and has been hiking etc w/ no issues. He's pleased with his progress and his current status. Pt performed his entire session w/ no brace today and only had 1 moment of instability in his left knee after plyometrics. With brace 1 one moment of instability with 18\" Left leg hop downs. Electronically signed by: Karey Lemus ATC    Note: If patient does not return for scheduled/recommended follow up visits, this note will serve as a discharge from care along with the most recent update on progress.

## 2023-07-21 NOTE — PROGRESS NOTES
Knee Arthroscopy Follow-up  Genesis Amador is here for follow up after left knee arthroscopic surgery. Surgery date was 2/26/22. Findings at surgery: Left knee anterior cruciate ligament tear and lateral meniscus tear. Pain is controlled with current analgesics. Medication(s) being used: acetaminophen PRN. The patient's pain is rated at 1/10. Doing PT at the [employer] office. Physical Examination:  Resp 16   Ht 5' 8\" (1.727 m)   Wt 219 lb (99.3 kg)   BMI 33.30 kg/m²    Patient is awake, alert, and in no acute distress. The incisions are healed. Left knee ROM 0-120      Assessment:   6 weeks status post left knee arthroscopy, anterior cruciate ligament reconstruction with quadriceps tendon allograft and partial lateral meniscectomy. Plan:   Continue PT    Weightbearing as tolerated    Refill pain medications as needed. I discussed the importance of brace wear. Brace until patient has normal gait and adequate quad strength. Brace can be discontinued by physical therapist when patient achieves that. No NSAIDs. Follow-up in 2 months for evaluation of progress or prn if problems. [place]. Beba Baca MD  Orthopaedic Surgery and Sports Medicine       This note was generated with use of a verbal recognition program [facility]) and was checked for errors. It is possible that there are still dictated errors within this office note. If so, please bring any errors to my attention for an addendum. All efforts were made to ensure that this office note is accurate.
Statement Selected

## 2023-07-25 ENCOUNTER — HOSPITAL ENCOUNTER (OUTPATIENT)
Dept: PHYSICAL THERAPY | Age: 33
Setting detail: THERAPIES SERIES
Discharge: HOME | End: 2023-07-25

## 2023-07-25 NOTE — FLOWSHEET NOTE
[address]    Phone: [phone]   Fax: [phone]   [facility], [place]      Kneeatient Name:  Rene Dowling    :  1990  MRN: [id number]  Restrictions/Precautions:   Medical/Treatment Diagnosis Information:    S/P ACL /LME Left       Insurance/Certification information:     Physician Information:   [employer]    Pain level: 0/10    Latex Allergy:  [x]NO      []YES  Preferred Language for Healthcare:   [x]English       []other:    Visit #        G[employer] PKG.  Plans to purchase new GAP pkg            Pain level:  0/10     SUBJECTIVE:   14 months  s/p  Pt has had some lateral knee soreness recently noted with prolonged walking. OBSERVATION:  Test measurements:    3/15: steristrips intact, no drainage or overt s/sx of infection  3/28/22: L knee ext AROM = lacking 5 at beginning of session, 0 with PROM/OP; L knee flex AAROM = 113  22: L knee flex AAROM = 124 (w/wall slides)  22: L knee ext AROM = lacking 1 at rest, 0 with QS; flex AAROM = 135 (w/wall slides)  LEFS = 46/80 = 42% disability   Less visible distal patellar swelling, fat pad swelling today compared to  session. Improved ambulation with no antalgia, has B varus alignment  5/10 Pt has mild TTP below the patella/ infrapatellar fat pad (hoffas fat pad)     MMT = R knee flexion and extension = 5/5 . MMT L knee flexion and extension = 5-/5     LEFS = 47/80 = 41% disability. Mid patella circumferencal measurements R = 39.25 cm.  L = 41.0 cm.   22:  ROM LEFT RIGHT   Knee ext 0 0   Knee Flex 140 141   Strength (measured in lbs using hand held dynamometer) LEFT RIGHT   HIP Flexors  67.4 76.2   HIP Abductors NT today due to issues with dynamometer NT today due to issues with dynamometer   Knee EXT (quad) 67.0 54.9   Knee Flex (HS) NT today due to issues with dynamometer NT today due to issues with dynamometer       RESTRICTIONS/PRECAUTIONS: s/p L ACLR

## 2023-08-11 ENCOUNTER — OFFICE VISIT (OUTPATIENT)
Dept: ENT CLINIC | Age: 33
End: 2023-08-11
Payer: COMMERCIAL

## 2023-08-11 VITALS
TEMPERATURE: 97.5 F | SYSTOLIC BLOOD PRESSURE: 136 MMHG | DIASTOLIC BLOOD PRESSURE: 73 MMHG | HEIGHT: 68 IN | BODY MASS INDEX: 35.77 KG/M2 | HEART RATE: 80 BPM | WEIGHT: 236 LBS

## 2023-08-11 DIAGNOSIS — H69.81 DYSFUNCTION OF RIGHT EUSTACHIAN TUBE: Primary | ICD-10-CM

## 2023-08-11 DIAGNOSIS — Z96.22 PATENT TYMPANOSTOMY TUBE: ICD-10-CM

## 2023-08-11 DIAGNOSIS — H92.01 RIGHT EAR PAIN: ICD-10-CM

## 2023-08-11 PROCEDURE — 99213 OFFICE O/P EST LOW 20 MIN: CPT | Performed by: OTOLARYNGOLOGY

## 2023-08-11 PROCEDURE — 1036F TOBACCO NON-USER: CPT | Performed by: OTOLARYNGOLOGY

## 2023-08-11 PROCEDURE — G8417 CALC BMI ABV UP PARAM F/U: HCPCS | Performed by: OTOLARYNGOLOGY

## 2023-08-11 PROCEDURE — G8427 DOCREV CUR MEDS BY ELIG CLIN: HCPCS | Performed by: OTOLARYNGOLOGY

## 2023-08-11 ASSESSMENT — ENCOUNTER SYMPTOMS
COLOR CHANGE: 0
EYE ITCHING: 0
DIARRHEA: 0
PHOTOPHOBIA: 0
SINUS PRESSURE: 0
RHINORRHEA: 0
SORE THROAT: 0
EYE PAIN: 0
FACIAL SWELLING: 0
STRIDOR: 0
CHOKING: 0
NAUSEA: 0
VOICE CHANGE: 0
COUGH: 0
EYE REDNESS: 0
TROUBLE SWALLOWING: 0
SHORTNESS OF BREATH: 0
SINUS PAIN: 0

## 2023-08-11 NOTE — PROGRESS NOTES
[facility]  [address], [address] [facility], [address] [place] [address]  P: [phone]  F: [phone]       Patient     Michael Montoya  1990    ChiefComplaint     Chief Complaint   Patient presents with    Ear Problem     He is here for a follow up on tubes. He feels pressure and discomfort in his right ear and he feels like his hearing is decreased. History of Present Illness     Michael Montoya is a pleasant 29 y.o. male here for follow-up for tube check. Right-sided tube placed in March 2023. In July, patient had water in the ear which caused a few days of pain and clogged sensation. Antibiotic drops were prescribed. He states currently not having any otorrhea. About once every 1 to 2 weeks he will experience some discomfort behind the angle of the right mandible. It is very mild. Additionally he still feels like the hearing in the right ear is slightly off.     Past Medical History     Past Medical History:   Diagnosis Date    Chronic allergic rhinitis 05/30/2018    COVID-19     12/2020    GERD without esophagitis 05/30/2018    Hypertension     Sleep apnea     uses cpap machine       Past Surgical History     Past Surgical History:   Procedure Laterality Date    ARM SURGERY  2005    ELBOW SURGERY Right     KNEE SURGERY Left     KNEE SURGERY Left 02/25/2022    LEFT KNEE ARTHROSCOPY, ANTERIOR CRUCIATE LIGAMENT RECONSTRUCTION WITH QUADRICEPS TENDON ALLOGRAFT, PARTIAL LATERAL MENISCECTOMY performed by Cheryl Koch MD at [address] [place] [address]  11/17/2016    bx       Family History     Family History   Problem Relation Age of Onset    Heart Disease Father     High Blood Pressure Father     Glaucoma Sister     Cancer Maternal Grandfather        Social History     Social History     Socioeconomic History    Marital status: Single     Spouse name: Not on file    Number of children: Not on file    Years of education: Not on file    Highest education

## 2023-08-22 ENCOUNTER — HOSPITAL ENCOUNTER (OUTPATIENT)
Dept: PHYSICAL THERAPY | Age: 33
Discharge: HOME | End: 2023-08-22

## 2023-08-22 NOTE — FLOWSHEET NOTE
[address]    Phone: [phone]   Fax: [phone]   [facility]      Kneeatient Name:  Melanee Gosselin    :  1990  MRN: [id number]  Restrictions/Precautions:   Medical/Treatment Diagnosis Information:    S/P ACL /LME Left  20     Insurance/Certification information:     Physician Information:   Tani Wallowa    Pain level: 0/10    Latex Allergy:  [x]NO      []YES  Preferred Language for Healthcare:   [x]English       []other:    Visit #        GAP PKG.  Plans to purchase new GAP pkg            Pain level:  0/10     SUBJECTIVE:   15 months  s/p  PT. Reports having some medial knee soreness in the last week. 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 [employer] has more opportunity to focus on free wts and heavier lifting. Pt knows when he's on unstable surfaces IE grass, his knee doesn't feel as good. Ran outside yesterday and had no issues. Ran up to 2 1/2 miles at [employer]. OBSERVATION:  Test measurements:     - TTP across the MJL or   3/15: steristrips intact, no drainage or overt s/sx of infection  3/28/22: L knee ext AROM = lacking 5 at beginning of session, 0 with PROM/OP; L knee flex AAROM = 113  22: L knee flex AAROM = 124 (w/wall slides)  22: L knee ext AROM = lacking 1 at rest, 0 with QS; flex AAROM = 135 (w/wall slides)  LEFS = 46/80 = 42% disability   Less visible distal patellar swelling, fat pad swelling today compared to  session. Improved ambulation with no antalgia, has B varus alignment  5/10 Pt has mild TTP below the patella/ infrapatellar fat pad (hoffas fat pad)     MMT = R k\  flexion and extension = 5/5 . MMT L knee flexion and extension = 5-/5     LEFS = 47/80 = 41% disability. Mid patella circumferencal measurements R = 39.25 cm.  L = 41.0 cm.   22:  ROM LEFT RIGHT   Knee ext 0 0

## 2023-10-13 SDOH — HEALTH STABILITY: PHYSICAL HEALTH: ON AVERAGE, HOW MANY MINUTES DO YOU ENGAGE IN EXERCISE AT THIS LEVEL?: 60 MIN

## 2023-10-13 SDOH — HEALTH STABILITY: PHYSICAL HEALTH: ON AVERAGE, HOW MANY DAYS PER WEEK DO YOU ENGAGE IN MODERATE TO STRENUOUS EXERCISE (LIKE A BRISK WALK)?: 3 DAYS

## 2023-10-16 ENCOUNTER — OFFICE VISIT (OUTPATIENT)
Dept: FAMILY MEDICINE CLINIC | Age: 33
End: 2023-10-16
Payer: COMMERCIAL

## 2023-10-16 VITALS
SYSTOLIC BLOOD PRESSURE: 126 MMHG | BODY MASS INDEX: 35.16 KG/M2 | HEIGHT: 68 IN | WEIGHT: 232 LBS | OXYGEN SATURATION: 98 % | HEART RATE: 62 BPM | DIASTOLIC BLOOD PRESSURE: 78 MMHG

## 2023-10-16 DIAGNOSIS — Z00.00 WELL ADULT EXAM: Primary | ICD-10-CM

## 2023-10-16 PROCEDURE — G8484 FLU IMMUNIZE NO ADMIN: HCPCS | Performed by: FAMILY MEDICINE

## 2023-10-16 PROCEDURE — 99395 PREV VISIT EST AGE 18-39: CPT | Performed by: FAMILY MEDICINE

## 2023-10-16 ASSESSMENT — ENCOUNTER SYMPTOMS: RESPIRATORY NEGATIVE: 1

## 2023-10-16 NOTE — PROGRESS NOTES
Lindsey Burton (:  1990) is a 29 y.o. male,Established patient, here for evaluation of the following chief complaint(s):  Established New Doctor (Looking into family history for possible brain aneurism )         ASSESSMENT/PLAN:  Tacos Flores was seen today for established new doctor. Diagnoses and all orders for this visit:    Well adult exam    Reviewed diet and exercise  Will try to get more details on family history to see if mri needed for screening of aneurysm     No follow-ups on file. Subjective   SUBJECTIVE/OBJECTIVE:  HPI  Pt is a of 29 y.o. male comes in today with   Chief Complaint   Patient presents with    Established New Doctor     Looking into family history for possible brain aneurism      Compliant with cpap. Dad passed 3 years ago. Had heart valve problems.  of a brain aneurysm    Exercises regularly. Working on diet. Vitals:    10/16/23 1324   BP: 126/78   Pulse: 62   SpO2: 98%   Weight: 232 lb (105.2 kg)   Height: 5' 8\" (1.727 m)     Review of Systems   Constitutional: Negative. Respiratory: Negative. Cardiovascular: Negative. Objective   Physical Exam  Constitutional:       Appearance: Normal appearance. Cardiovascular:      Rate and Rhythm: Normal rate and regular rhythm. Heart sounds: No murmur heard. Pulmonary:      Effort: Pulmonary effort is normal.      Breath sounds: Normal breath sounds. Musculoskeletal:      Cervical back: No rigidity. Lymphadenopathy:      Cervical: No cervical adenopathy. Skin:     General: Skin is warm and dry. Capillary Refill: Capillary refill takes less than 2 seconds. Neurological:      Mental Status: He is alert. An electronic signature was used to authenticate this note.     --Summer Loya MD

## 2023-11-07 ENCOUNTER — PATIENT MESSAGE (OUTPATIENT)
Dept: FAMILY MEDICINE CLINIC | Age: 33
End: 2023-11-07

## 2023-11-08 SDOH — ECONOMIC STABILITY: INCOME INSECURITY: HOW HARD IS IT FOR YOU TO PAY FOR THE VERY BASICS LIKE FOOD, HOUSING, MEDICAL CARE, AND HEATING?: NOT HARD AT ALL

## 2023-11-08 SDOH — ECONOMIC STABILITY: FOOD INSECURITY: WITHIN THE PAST 12 MONTHS, YOU WORRIED THAT YOUR FOOD WOULD RUN OUT BEFORE YOU GOT MONEY TO BUY MORE.: NEVER TRUE

## 2023-11-08 SDOH — ECONOMIC STABILITY: HOUSING INSECURITY
IN THE LAST 12 MONTHS, WAS THERE A TIME WHEN YOU DID NOT HAVE A STEADY PLACE TO SLEEP OR SLEPT IN A SHELTER (INCLUDING NOW)?: NO

## 2023-11-08 SDOH — ECONOMIC STABILITY: TRANSPORTATION INSECURITY
IN THE PAST 12 MONTHS, HAS LACK OF TRANSPORTATION KEPT YOU FROM MEETINGS, WORK, OR FROM GETTING THINGS NEEDED FOR DAILY LIVING?: NO

## 2023-11-08 SDOH — ECONOMIC STABILITY: FOOD INSECURITY: WITHIN THE PAST 12 MONTHS, THE FOOD YOU BOUGHT JUST DIDN'T LAST AND YOU DIDN'T HAVE MONEY TO GET MORE.: NEVER TRUE
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
## 2023-11-08 NOTE — TELEPHONE ENCOUNTER
Pt is scheduled for this Friday 11/10 @ 11:20am. He is wondering if you are wanting him to get any kind of xray prior to seeing him?

## 2023-11-10 ENCOUNTER — OFFICE VISIT (OUTPATIENT)
Dept: FAMILY MEDICINE CLINIC | Age: 33
End: 2023-11-10

## 2023-11-10 ENCOUNTER — HOSPITAL ENCOUNTER (OUTPATIENT)
Dept: GENERAL RADIOLOGY | Age: 33
Discharge: HOME | End: 2023-11-10

## 2023-11-10 VITALS
OXYGEN SATURATION: 100 % | HEIGHT: 68 IN | HEART RATE: 74 BPM | RESPIRATION RATE: 16 BRPM | SYSTOLIC BLOOD PRESSURE: 138 MMHG | BODY MASS INDEX: 35.31 KG/M2 | DIASTOLIC BLOOD PRESSURE: 78 MMHG | WEIGHT: 233 LBS

## 2023-11-10 DIAGNOSIS — M25.531 RIGHT WRIST PAIN: ICD-10-CM

## 2023-11-10 DIAGNOSIS — M25.531 RIGHT WRIST PAIN: Primary | ICD-10-CM

## 2023-11-10 PROCEDURE — 73110 X-RAY EXAM OF WRIST: CPT

## 2023-11-10 PROCEDURE — 99213 OFFICE O/P EST LOW 20 MIN: CPT | Performed by: FAMILY MEDICINE
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
## 2023-11-17 ENCOUNTER — HOSPITAL ENCOUNTER (OUTPATIENT)
Dept: GENERAL RADIOLOGY | Age: 33
Discharge: HOME | End: 2023-11-17

## 2023-11-17 DIAGNOSIS — M25.531 RIGHT WRIST PAIN: Primary | ICD-10-CM

## 2023-11-17 DIAGNOSIS — R93.89 ABNORMAL X-RAY: ICD-10-CM

## 2023-11-17 DIAGNOSIS — M25.531 RIGHT WRIST PAIN: ICD-10-CM

## 2023-11-17 PROCEDURE — 73110 X-RAY EXAM OF WRIST: CPT

## 2023-11-20 ENCOUNTER — TELEPHONE (OUTPATIENT)
Dept: FAMILY MEDICINE CLINIC | Age: 33
End: 2023-11-20

## 2023-11-20 NOTE — TELEPHONE ENCOUNTER
Patient returned call from [facility] message from [id number] [facility], patient will schedule ordered CT per dr lira

## 2023-12-01 ENCOUNTER — HOSPITAL ENCOUNTER (OUTPATIENT)
Dept: CT IMAGING | Age: 33
Discharge: HOME | End: 2023-12-01
Payer: OTHER MISCELLANEOUS

## 2023-12-01 DIAGNOSIS — R93.89 ABNORMAL X-RAY: ICD-10-CM

## 2023-12-01 DIAGNOSIS — M25.531 RIGHT WRIST PAIN: ICD-10-CM

## 2023-12-01 PROCEDURE — 73200 CT UPPER EXTREMITY W/O DYE: CPT

## 2023-12-03 DIAGNOSIS — S62.001G CLOSED NONDISPLACED FRACTURE OF SCAPHOID OF RIGHT WRIST WITH DELAYED HEALING, UNSPECIFIED PORTION OF SCAPHOID, SUBSEQUENT ENCOUNTER: Primary | ICD-10-CM

## 2023-12-08 ENCOUNTER — OFFICE VISIT (OUTPATIENT)
Dept: FAMILY MEDICINE CLINIC | Age: 33
End: 2023-12-08
Payer: OTHER MISCELLANEOUS

## 2023-12-08 ENCOUNTER — OFFICE VISIT (OUTPATIENT)
Dept: ENT CLINIC | Age: 33
End: 2023-12-08
Payer: COMMERCIAL

## 2023-12-08 VITALS
BODY MASS INDEX: 34.56 KG/M2 | HEART RATE: 85 BPM | DIASTOLIC BLOOD PRESSURE: 62 MMHG | WEIGHT: 228 LBS | SYSTOLIC BLOOD PRESSURE: 104 MMHG | HEIGHT: 68 IN | OXYGEN SATURATION: 96 %

## 2023-12-08 VITALS
HEIGHT: 68 IN | DIASTOLIC BLOOD PRESSURE: 89 MMHG | TEMPERATURE: 97.5 F | SYSTOLIC BLOOD PRESSURE: 141 MMHG | RESPIRATION RATE: 16 BRPM | WEIGHT: 227.2 LBS | HEART RATE: 72 BPM | BODY MASS INDEX: 34.43 KG/M2

## 2023-12-08 DIAGNOSIS — J30.2 SEASONAL ALLERGIES: ICD-10-CM

## 2023-12-08 DIAGNOSIS — Z01.818 PREOP GENERAL PHYSICAL EXAM: Primary | ICD-10-CM

## 2023-12-08 DIAGNOSIS — K21.9 GERD WITHOUT ESOPHAGITIS: Chronic | ICD-10-CM

## 2023-12-08 DIAGNOSIS — Z96.22 PATENT TYMPANOSTOMY TUBE: ICD-10-CM

## 2023-12-08 DIAGNOSIS — S62.034G: ICD-10-CM

## 2023-12-08 DIAGNOSIS — G47.33 OSA (OBSTRUCTIVE SLEEP APNEA): Chronic | ICD-10-CM

## 2023-12-08 DIAGNOSIS — H69.91 DYSFUNCTION OF RIGHT EUSTACHIAN TUBE: Primary | ICD-10-CM

## 2023-12-08 PROCEDURE — G8427 DOCREV CUR MEDS BY ELIG CLIN: HCPCS | Performed by: NURSE PRACTITIONER

## 2023-12-08 PROCEDURE — G8417 CALC BMI ABV UP PARAM F/U: HCPCS | Performed by: OTOLARYNGOLOGY

## 2023-12-08 PROCEDURE — 99213 OFFICE O/P EST LOW 20 MIN: CPT | Performed by: NURSE PRACTITIONER

## 2023-12-08 PROCEDURE — 99212 OFFICE O/P EST SF 10 MIN: CPT | Performed by: OTOLARYNGOLOGY

## 2023-12-08 PROCEDURE — 1036F TOBACCO NON-USER: CPT | Performed by: OTOLARYNGOLOGY

## 2023-12-08 PROCEDURE — G8417 CALC BMI ABV UP PARAM F/U: HCPCS | Performed by: NURSE PRACTITIONER

## 2023-12-08 PROCEDURE — G8484 FLU IMMUNIZE NO ADMIN: HCPCS | Performed by: OTOLARYNGOLOGY

## 2023-12-08 PROCEDURE — G8484 FLU IMMUNIZE NO ADMIN: HCPCS | Performed by: NURSE PRACTITIONER

## 2023-12-08 PROCEDURE — 1036F TOBACCO NON-USER: CPT | Performed by: NURSE PRACTITIONER

## 2023-12-08 PROCEDURE — G8427 DOCREV CUR MEDS BY ELIG CLIN: HCPCS | Performed by: OTOLARYNGOLOGY

## 2023-12-08 ASSESSMENT — ENCOUNTER SYMPTOMS
NAUSEA: 0
COUGH: 0
DIARRHEA: 0
VOMITING: 0
ABDOMINAL PAIN: 0
CHEST TIGHTNESS: 0
WHEEZING: 0
SHORTNESS OF BREATH: 0
SORE THROAT: 0
RHINORRHEA: 0
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
## 2024-04-05 ENCOUNTER — OFFICE VISIT (OUTPATIENT)
Dept: ENT CLINIC | Age: 34
End: 2024-04-05
Payer: COMMERCIAL

## 2024-04-05 VITALS
WEIGHT: 240.2 LBS | BODY MASS INDEX: 36.4 KG/M2 | HEIGHT: 68 IN | HEART RATE: 71 BPM | TEMPERATURE: 96.9 F | RESPIRATION RATE: 16 BRPM | DIASTOLIC BLOOD PRESSURE: 81 MMHG | SYSTOLIC BLOOD PRESSURE: 133 MMHG

## 2024-04-05 DIAGNOSIS — Z96.22 PATENT TYMPANOSTOMY TUBE: ICD-10-CM

## 2024-04-05 DIAGNOSIS — H69.91 DYSFUNCTION OF RIGHT EUSTACHIAN TUBE: Primary | ICD-10-CM

## 2024-04-05 PROCEDURE — G8417 CALC BMI ABV UP PARAM F/U: HCPCS | Performed by: OTOLARYNGOLOGY

## 2024-04-05 PROCEDURE — G8427 DOCREV CUR MEDS BY ELIG CLIN: HCPCS | Performed by: OTOLARYNGOLOGY

## 2024-04-05 PROCEDURE — 1036F TOBACCO NON-USER: CPT | Performed by: OTOLARYNGOLOGY

## 2024-04-05 PROCEDURE — 99212 OFFICE O/P EST SF 10 MIN: CPT | Performed by: OTOLARYNGOLOGY

## 2024-04-05 NOTE — PROGRESS NOTES
of alcohol     Types: 1 Cans of beer per week     Comment: occassionally    Drug use: Never    Sexual activity: Never   Other Topics Concern    Not on file   Social History Narrative    Not on file     Social Determinants of Health     Financial Resource Strain: Low Risk  (7/25/2022)    Overall Financial Resource Strain (CARDIA)     Difficulty of Paying Living Expenses: Not hard at all   Food Insecurity: Not on file (11/8/2023)   Transportation Needs: Not on file   Physical Activity: Sufficiently Active (10/13/2023)    Exercise Vital Sign     Days of Exercise per Week: 3 days     Minutes of Exercise per Session: 60 min   Stress: Not on file   Social Connections: Not on file   Intimate Partner Violence: Not At Risk (10/13/2023)    Humiliation, Afraid, Rape, and Kick questionnaire     Fear of Current or Ex-Partner: No     Emotionally Abused: No     Physically Abused: No     Sexually Abused: No   Housing Stability: Not on file       Allergies     Allergies   Allergen Reactions    Seasonal        Medications     Current Outpatient Medications   Medication Sig Dispense Refill    fluticasone (FLONASE) 50 MCG/ACT nasal spray 2 sprays by Each Nostril route every evening      Fexofenadine HCl (ALLEGRA PO) Take by mouth daily as needed       famotidine (PEPCID) 20 MG tablet Take 1 tablet by mouth 2 times daily       No current facility-administered medications for this visit.       Review of Systems     Review of Systems   All other systems reviewed and are negative.        PhysicalExam     Vitals:    04/05/24 1445   BP: 133/81   Pulse: 71   Resp: 16   Temp: 96.9 °F (36.1 °C)       Physical Exam  Constitutional:       Appearance: He is well-developed.   HENT:      Head: Normocephalic and atraumatic.      Jaw: No trismus.      Right Ear: Tympanic membrane, ear canal and external ear normal. No drainage. No middle ear effusion. A PE tube is present. Tympanic membrane is not perforated.      Left Ear: Tympanic membrane, ear

## 2024-05-21 ENCOUNTER — OFFICE VISIT (OUTPATIENT)
Dept: ORTHOPEDIC SURGERY | Age: 34
End: 2024-05-21
Payer: COMMERCIAL

## 2024-05-21 VITALS — HEIGHT: 68 IN | BODY MASS INDEX: 35.16 KG/M2 | WEIGHT: 232 LBS

## 2024-05-21 DIAGNOSIS — M79.672 LEFT FOOT PAIN: Primary | ICD-10-CM
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
## 2024-05-21 PROCEDURE — G8417 CALC BMI ABV UP PARAM F/U: HCPCS | Performed by: NURSE PRACTITIONER

## 2024-05-21 PROCEDURE — 99213 OFFICE O/P EST LOW 20 MIN: CPT | Performed by: NURSE PRACTITIONER

## 2024-05-21 PROCEDURE — 1036F TOBACCO NON-USER: CPT | Performed by: NURSE PRACTITIONER

## 2024-05-21 PROCEDURE — G8427 DOCREV CUR MEDS BY ELIG CLIN: HCPCS | Performed by: NURSE PRACTITIONER

## 2024-05-21 NOTE — PROGRESS NOTES
the next 2 weeks.  He is comfortable in his shoes so would not recommend boot at this time.  He can take NSAIDs, ibuprofen over-the-counter as needed.  He is refusing any stronger.  Arebi in 2 weeks if his pain is not improved.        Alexandria Medellin, MARILEE - CNP  5/21/2024  7:32 PM

## 2024-08-16 ENCOUNTER — OFFICE VISIT (OUTPATIENT)
Dept: ENT CLINIC | Age: 34
End: 2024-08-16
Payer: COMMERCIAL

## 2024-08-16 VITALS
DIASTOLIC BLOOD PRESSURE: 85 MMHG | SYSTOLIC BLOOD PRESSURE: 134 MMHG | TEMPERATURE: 97.3 F | HEIGHT: 68 IN | HEART RATE: 63 BPM | RESPIRATION RATE: 16 BRPM | BODY MASS INDEX: 36.37 KG/M2 | WEIGHT: 240 LBS

## 2024-08-16 DIAGNOSIS — H69.91 DYSFUNCTION OF RIGHT EUSTACHIAN TUBE: Primary | ICD-10-CM

## 2024-08-16 DIAGNOSIS — Z96.22 PATENT TYMPANOSTOMY TUBE: ICD-10-CM

## 2024-08-16 PROCEDURE — G8427 DOCREV CUR MEDS BY ELIG CLIN: HCPCS | Performed by: OTOLARYNGOLOGY

## 2024-08-16 PROCEDURE — G8417 CALC BMI ABV UP PARAM F/U: HCPCS | Performed by: OTOLARYNGOLOGY

## 2024-08-16 PROCEDURE — 99212 OFFICE O/P EST SF 10 MIN: CPT | Performed by: OTOLARYNGOLOGY

## 2024-08-16 PROCEDURE — 1036F TOBACCO NON-USER: CPT | Performed by: OTOLARYNGOLOGY

## 2024-08-16 ASSESSMENT — ENCOUNTER SYMPTOMS
SORE THROAT: 0
EYE REDNESS: 0
EYE PAIN: 0
STRIDOR: 0
SINUS PRESSURE: 0
SINUS PAIN: 0
NAUSEA: 0
SHORTNESS OF BREATH: 0
FACIAL SWELLING: 0
COLOR CHANGE: 0
DIARRHEA: 0
TROUBLE SWALLOWING: 0
VOICE CHANGE: 0
COUGH: 0
EYE ITCHING: 0
RHINORRHEA: 0
CHOKING: 0
PHOTOPHOBIA: 0

## 2024-08-16 NOTE — PROGRESS NOTES
[facility]  [address] , [address]  [place] [address]  P: [phone]  F: [phone]       Patient     Robert Smith  1990    ChiefComplaint     Chief Complaint   Patient presents with    Ear Problem     Follow check tube still can tell right ear is off for hearing        History of Present Illness     Robert Smith is a pleasant 34 y.o. male here for 4-month follow-up for tube check.  Right sided ear tube placed in March 2023.  Continues to complain of some sensation of muffled hearing in the right ear.  But overall tolerable.  Eustachian tube dysfunction symptoms improved with tube.    Past Medical History     Past Medical History:   Diagnosis Date    Chronic allergic rhinitis 05/30/2018    COVID-19     12/2020    GERD without esophagitis 05/30/2018    Hypertension     Sleep apnea     uses cpap machine       Past Surgical History     Past Surgical History:   Procedure Laterality Date    ARM SURGERY  2005    ELBOW SURGERY Right     KNEE SURGERY Left     KNEE SURGERY Left 02/25/2022    LEFT KNEE ARTHROSCOPY, ANTERIOR CRUCIATE LIGAMENT RECONSTRUCTION WITH QUADRICEPS TENDON ALLOGRAFT, PARTIAL LATERAL MENISCECTOMY performed by Joel Lawler MD at [facility] OR    TONSILLECTOMY      UPPER GASTROINTESTINAL ENDOSCOPY  11/17/2016    bx    WRIST FRACTURE SURGERY         Family History     Family History   Problem Relation Age of Onset    Heart Disease Father     High Blood Pressure Father     Glaucoma Sister     Cancer Maternal Grandfather     Other Maternal Grandfather         Glaucoma    Other Sister         Glaucoma       Social History     Social History     Socioeconomic History    Marital status: Single     Spouse name: Not on file    Number of children: Not on file    Years of education: Not on file    Highest education level: Not on file   Occupational History    Not on file   Tobacco Use    Smoking status: Never    Smokeless tobacco: Never   Vaping Use    Vaping status: Never Used

## 2024-08-23 ENCOUNTER — TELEPHONE (OUTPATIENT)
Dept: ORTHOPEDIC SURGERY | Age: 34
End: 2024-08-23

## 2024-08-23 ENCOUNTER — OFFICE VISIT (OUTPATIENT)
Dept: ORTHOPEDIC SURGERY | Age: 34
End: 2024-08-23
Payer: COMMERCIAL

## 2024-08-23 VITALS — HEIGHT: 68 IN | BODY MASS INDEX: 35.77 KG/M2 | WEIGHT: 236 LBS | RESPIRATION RATE: 16 BRPM

## 2024-08-23 DIAGNOSIS — M79.672 LEFT FOOT PAIN: Primary | ICD-10-CM

## 2024-08-23 PROCEDURE — G8417 CALC BMI ABV UP PARAM F/U: HCPCS | Performed by: STUDENT IN AN ORGANIZED HEALTH CARE EDUCATION/TRAINING PROGRAM

## 2024-08-23 PROCEDURE — G8427 DOCREV CUR MEDS BY ELIG CLIN: HCPCS | Performed by: STUDENT IN AN ORGANIZED HEALTH CARE EDUCATION/TRAINING PROGRAM

## 2024-08-23 PROCEDURE — 99213 OFFICE O/P EST LOW 20 MIN: CPT | Performed by: STUDENT IN AN ORGANIZED HEALTH CARE EDUCATION/TRAINING PROGRAM

## 2024-08-23 PROCEDURE — 1036F TOBACCO NON-USER: CPT | Performed by: STUDENT IN AN ORGANIZED HEALTH CARE EDUCATION/TRAINING PROGRAM

## 2024-08-23 NOTE — TELEPHONE ENCOUNTER
Spoke with patient re: MRI.    Advised [facility] for MRI. All necessary documentation forwarded to [facility]. They should reach out to him directly to schedule. If he does not hear from them by Monday, he will call. An in office appointment will be required a minimum of three business days after the MRI to obtain results and treatment options.    Patient verbalized understanding and agreed

## 2024-08-23 NOTE — PROGRESS NOTES
tenderness over the cuboid, I would recommend an MRI to evaluate the cuboid for a stress reaction.     MRI order placed.     No high impact activity until MRI follow up. We discussed low impact activity such as biking or swimming.     Follow up after MRI.             JETHRO High, PA-C  Board Certified by the National Committee on Certification of Physician Assistants  [facility]      Disclaimer:  This note was generated with use of a verbal recognition program and an attempt was made to check for errors.  It is possible that there are still dictated errors within this office note.  If so, please bring any significant errors to my attention for an addendum.  All efforts were made to ensure that this office note is accurate.

## 2024-10-03 ENCOUNTER — OFFICE VISIT (OUTPATIENT)
Dept: ORTHOPEDIC SURGERY | Age: 34
End: 2024-10-03
Payer: COMMERCIAL

## 2024-10-03 VITALS — WEIGHT: 236 LBS | HEIGHT: 68 IN | BODY MASS INDEX: 35.77 KG/M2

## 2024-10-03 DIAGNOSIS — M76.72 PERONEAL TENDINITIS, LEFT: Primary | ICD-10-CM

## 2024-10-03 PROCEDURE — G8484 FLU IMMUNIZE NO ADMIN: HCPCS | Performed by: STUDENT IN AN ORGANIZED HEALTH CARE EDUCATION/TRAINING PROGRAM

## 2024-10-03 PROCEDURE — 1036F TOBACCO NON-USER: CPT | Performed by: STUDENT IN AN ORGANIZED HEALTH CARE EDUCATION/TRAINING PROGRAM

## 2024-10-03 PROCEDURE — G8427 DOCREV CUR MEDS BY ELIG CLIN: HCPCS | Performed by: STUDENT IN AN ORGANIZED HEALTH CARE EDUCATION/TRAINING PROGRAM

## 2024-10-03 PROCEDURE — 99213 OFFICE O/P EST LOW 20 MIN: CPT | Performed by: STUDENT IN AN ORGANIZED HEALTH CARE EDUCATION/TRAINING PROGRAM

## 2024-10-03 PROCEDURE — G8417 CALC BMI ABV UP PARAM F/U: HCPCS | Performed by: STUDENT IN AN ORGANIZED HEALTH CARE EDUCATION/TRAINING PROGRAM

## 2024-10-03 NOTE — PROGRESS NOTES
CHIEF COMPLAINT: Left foot pain    DATE OF ONSET: April 2024    History:   Mr. Smith 34 y.o. male presents today for evaluation of left foot pain.  The patient was self-referred.  This is evaluated as a personal. There was not a history of injury. He started a running regimen in January of 2024. He states he would run a few miles at a time 2-3 days in a row and would develop pain on the second or third day. He saw Alexandria Medellin at [facility] in May of 2024 and she recommended a period of rest from high impact activities. His foot did feel better during the rest but then pain returned when he began running again. He went to a running store and was fit for [employer] which helped some.  He rates pain at 4/10.   The pain is located at the proximal midfoot over the 4th and 5th metatarsal and cuboid.   There is no swelling in the foot. The patient has not taken NSAIDs. He watched a video that demonstrated how to tape his foot for cuboid pain and he states it does help. The patient's occupation is .    Interval history: The patient presents for MRI follow up. He rates pain 0/10 today but pain does increase with high impact activity. He has been biking instead of running. Pain is located at the dorsal lateral foot.         Past Medical History:   Diagnosis Date    Chronic allergic rhinitis 05/30/2018    COVID-19     12/2020    GERD without esophagitis 05/30/2018    Hypertension     Sleep apnea     uses cpap machine       Past Surgical History:   Procedure Laterality Date    ARM SURGERY  2005    ELBOW SURGERY Right     KNEE SURGERY Left     KNEE SURGERY Left 02/25/2022    LEFT KNEE ARTHROSCOPY, ANTERIOR CRUCIATE LIGAMENT RECONSTRUCTION WITH QUADRICEPS TENDON ALLOGRAFT, PARTIAL LATERAL MENISCECTOMY performed by Joel Lawler MD at [facility] OR    TONSILLECTOMY      UPPER GASTROINTESTINAL ENDOSCOPY  11/17/2016    bx    WRIST FRACTURE SURGERY         Current Outpatient Medications on File Prior to Visit   Medication Sig

## 2024-10-06 NOTE — PROGRESS NOTES
Robert Smith   34 y.o. male   1990    This is patient's first visit with me.  Robert Smith is here to establish care as their new PCP.      Reason(s) for visit:   Chief Complaint   Patient presents with    Established New Doctor    Other     Patient has worries over fathers passing and if anything is genetic. Aneurysm at 67, heart valve replacement, vascular issues in legs.     HPI:    Office visit encounter:    Pt came to establish care.  He asked my medical advice overall from a preventative standpoint, especially in regards to his family history.    Pt reported that his father passed away in 2020.  Around 2016, he had repair of aortic aneurysm and also had mechanical valve placed.  He also developed PAD (?), which affected his ambulation at some point because of increased pain.  He has relevant risk factors of HTN and smoking hx.  No hx of diabetes.  He had extensive workup done.  He later passed away from 2/2 to complications of a cerebral aneurysm.  There's no FMH of aneurysm problems.  He has obtained medical records.  From what he knows of, his father had a bicuspid aortic valve.  Pt himself had echo which did not show a bicuspid aortic valve.  His echo was done in Oct 2018 at [facility]. Mild LVH was noted.  No FMH of autoimmune hx.    Pt has hx of being active coaching wrestling. He hasn't been physically active as much. Has hx of knee and wrist surgery.    Chronic health issues:    JULIAN:  -Diagnosed around Feb 2020.  -Pt has been tolerating CPAP well.  He's managed by [facility] sleep medicine.    PDMP monitoring:  -No noteworthy findings.  -Last report:   Last PDMP Jose as Reviewed ([place]):  Review User Review Instant Review Result   KOBY MACHADO 10/30/2024  1:38 PM Reviewed PDMP [1]       Allergies   Allergen Reactions    Seasonal        Current Outpatient Medications on File Prior to Visit   Medication Sig Dispense Refill    fluticasone (FLONASE) 50 MCG/ACT nasal spray 2 sprays by Each

## 2024-10-29 SDOH — HEALTH STABILITY: PHYSICAL HEALTH: ON AVERAGE, HOW MANY DAYS PER WEEK DO YOU ENGAGE IN MODERATE TO STRENUOUS EXERCISE (LIKE A BRISK WALK)?: 3 DAYS

## 2024-10-29 SDOH — HEALTH STABILITY: PHYSICAL HEALTH: ON AVERAGE, HOW MANY MINUTES DO YOU ENGAGE IN EXERCISE AT THIS LEVEL?: 30 MIN

## 2024-10-30 ENCOUNTER — OFFICE VISIT (OUTPATIENT)
Dept: FAMILY MEDICINE CLINIC | Age: 34
End: 2024-10-30

## 2024-10-30 VITALS
HEART RATE: 60 BPM | OXYGEN SATURATION: 97 % | TEMPERATURE: 97.2 F | SYSTOLIC BLOOD PRESSURE: 122 MMHG | BODY MASS INDEX: 36.77 KG/M2 | HEIGHT: 68 IN | WEIGHT: 242.6 LBS | DIASTOLIC BLOOD PRESSURE: 86 MMHG

## 2024-10-30 DIAGNOSIS — I51.7 MILD CONCENTRIC LEFT VENTRICULAR HYPERTROPHY (LVH): ICD-10-CM

## 2024-10-30 DIAGNOSIS — K21.9 GERD WITHOUT ESOPHAGITIS: ICD-10-CM

## 2024-10-30 DIAGNOSIS — Z76.89 ENCOUNTER TO ESTABLISH CARE WITH NEW DOCTOR: Primary | ICD-10-CM

## 2024-10-30 DIAGNOSIS — J30.2 SEASONAL ALLERGIES: ICD-10-CM

## 2024-10-30 DIAGNOSIS — Z71.9 HEALTH EDUCATION/COUNSELING: ICD-10-CM

## 2024-10-30 DIAGNOSIS — G47.33 OSA (OBSTRUCTIVE SLEEP APNEA): ICD-10-CM

## 2024-10-30 DIAGNOSIS — Z82.79 FAMILY HISTORY OF BICUSPID AORTIC VALVE: ICD-10-CM

## 2024-10-30 ASSESSMENT — PATIENT HEALTH QUESTIONNAIRE - PHQ9
SUM OF ALL RESPONSES TO PHQ QUESTIONS 1-9: 0
SUM OF ALL RESPONSES TO PHQ QUESTIONS 1-9: 0
1. LITTLE INTEREST OR PLEASURE IN DOING THINGS: NOT AT ALL
SUM OF ALL RESPONSES TO PHQ QUESTIONS 1-9: 0
SUM OF ALL RESPONSES TO PHQ9 QUESTIONS 1 & 2: 0
SUM OF ALL RESPONSES TO PHQ QUESTIONS 1-9: 0
2. FEELING DOWN, DEPRESSED OR HOPELESS: NOT AT ALL

## 2024-10-30 ASSESSMENT — ENCOUNTER SYMPTOMS
VOMITING: 0
TROUBLE SWALLOWING: 0
SINUS PRESSURE: 0
DIARRHEA: 0
BLOOD IN STOOL: 0
SHORTNESS OF BREATH: 0
WHEEZING: 0
RHINORRHEA: 0
ABDOMINAL PAIN: 0
BACK PAIN: 0
COUGH: 0
CHEST TIGHTNESS: 0
CONSTIPATION: 0
NAUSEA: 0
ABDOMINAL DISTENTION: 0

## 2024-11-18 NOTE — PROGRESS NOTES
snacking and late night eating.  -Cancer screening guidelines (if relevant to the patient) were discussed.    Dental exam: recommended every 6 months as well as directed by dentist.    Visual exam: recommended annually.    There are no discontinued medications.     General information on medications:  -When it comes to medications, whether with starting or adding a new medication or increasing the dose of a current medication, the benefits and risks have to always be considered and weighed over, especially if one is taking other medications as well.   -There are no medications that have no side effects and that there is always a risk involved with taking a medication.    -If a side effect were to occur with starting a new medication or with increasing the dose of a current medication that either the medication can be totally discontinued altogether or simply decrease the dose of it and if this would be the case a follow-up appointment would be deemed necessary.    -The drug allergy list will then be updated with the corresponding side effect(s) if it's deemed to be a true 'drug allergy'.    -The most common adverse effects of medication(s) were addressed at today's visit.    -Lastly, the coverage status of a medication may vary from insurance to insurance and the only way to verify if the medication is covered is to send an actual prescription in.    -The drug formulary of each insurance changes without any warning or notification to the healthcare provider let alone the pharmacy.  -The cost of medications vary from insurance to insurance and the cost is always subject to change just like the drug formulary.    Follow-up: Return in about 3 weeks (around 12/27/2024) for IP - LVH..     Patient was informed that if his or her symptoms worsen to follow up with me sooner or go to the nearest ER if the symptoms are very significant and warrant higher level of care.    Regarding my note:  -This note was composed (by me 
Normal

## 2024-12-06 ENCOUNTER — OFFICE VISIT (OUTPATIENT)
Dept: ENT CLINIC | Age: 34
End: 2024-12-06
Payer: COMMERCIAL

## 2024-12-06 ENCOUNTER — OFFICE VISIT (OUTPATIENT)
Dept: FAMILY MEDICINE CLINIC | Age: 34
End: 2024-12-06

## 2024-12-06 VITALS
OXYGEN SATURATION: 98 % | BODY MASS INDEX: 37.25 KG/M2 | WEIGHT: 245 LBS | DIASTOLIC BLOOD PRESSURE: 76 MMHG | SYSTOLIC BLOOD PRESSURE: 124 MMHG | HEART RATE: 72 BPM

## 2024-12-06 VITALS
HEIGHT: 68 IN | TEMPERATURE: 97.3 F | WEIGHT: 244 LBS | SYSTOLIC BLOOD PRESSURE: 134 MMHG | RESPIRATION RATE: 16 BRPM | BODY MASS INDEX: 36.98 KG/M2 | HEART RATE: 80 BPM | DIASTOLIC BLOOD PRESSURE: 81 MMHG

## 2024-12-06 DIAGNOSIS — K21.9 GERD WITHOUT ESOPHAGITIS: ICD-10-CM

## 2024-12-06 DIAGNOSIS — G47.33 OSA (OBSTRUCTIVE SLEEP APNEA): ICD-10-CM

## 2024-12-06 DIAGNOSIS — H69.91 DYSFUNCTION OF RIGHT EUSTACHIAN TUBE: Primary | ICD-10-CM

## 2024-12-06 DIAGNOSIS — I51.7 MILD CONCENTRIC LEFT VENTRICULAR HYPERTROPHY (LVH): ICD-10-CM

## 2024-12-06 DIAGNOSIS — Z13.29 SCREENING FOR ENDOCRINE, NUTRITIONAL, METABOLIC AND IMMUNITY DISORDER: ICD-10-CM

## 2024-12-06 DIAGNOSIS — Z71.9 HEALTH EDUCATION/COUNSELING: ICD-10-CM

## 2024-12-06 DIAGNOSIS — Z83.2 FAMILY HISTORY OF AUTOIMMUNE DISORDER: ICD-10-CM

## 2024-12-06 DIAGNOSIS — Z13.21 SCREENING FOR ENDOCRINE, NUTRITIONAL, METABOLIC AND IMMUNITY DISORDER: ICD-10-CM

## 2024-12-06 DIAGNOSIS — Z96.22 PATENT TYMPANOSTOMY TUBE: ICD-10-CM

## 2024-12-06 DIAGNOSIS — Z13.0 SCREENING FOR ENDOCRINE, NUTRITIONAL, METABOLIC AND IMMUNITY DISORDER: ICD-10-CM

## 2024-12-06 DIAGNOSIS — Z13.228 SCREENING FOR ENDOCRINE, NUTRITIONAL, METABOLIC AND IMMUNITY DISORDER: ICD-10-CM

## 2024-12-06 DIAGNOSIS — Z00.00 ENCOUNTER FOR PREVENTATIVE ADULT HEALTH CARE EXAMINATION: Primary | ICD-10-CM

## 2024-12-06 DIAGNOSIS — Z23 IMMUNIZATION DUE: ICD-10-CM

## 2024-12-06 PROCEDURE — G8417 CALC BMI ABV UP PARAM F/U: HCPCS | Performed by: OTOLARYNGOLOGY

## 2024-12-06 PROCEDURE — 1036F TOBACCO NON-USER: CPT | Performed by: OTOLARYNGOLOGY

## 2024-12-06 PROCEDURE — 99212 OFFICE O/P EST SF 10 MIN: CPT | Performed by: OTOLARYNGOLOGY

## 2024-12-06 PROCEDURE — G8427 DOCREV CUR MEDS BY ELIG CLIN: HCPCS | Performed by: OTOLARYNGOLOGY

## 2024-12-06 PROCEDURE — G8484 FLU IMMUNIZE NO ADMIN: HCPCS | Performed by: OTOLARYNGOLOGY

## 2024-12-06 RX ORDER — CANDESARTAN 4 MG/1
4 TABLET ORAL NIGHTLY
Qty: 30 TABLET | Refills: 11 | Status: SHIPPED | OUTPATIENT
Start: 2024-12-06

## 2024-12-06 SDOH — ECONOMIC STABILITY: FOOD INSECURITY: WITHIN THE PAST 12 MONTHS, YOU WORRIED THAT YOUR FOOD WOULD RUN OUT BEFORE YOU GOT MONEY TO BUY MORE.: NEVER TRUE

## 2024-12-06 SDOH — ECONOMIC STABILITY: INCOME INSECURITY: HOW HARD IS IT FOR YOU TO PAY FOR THE VERY BASICS LIKE FOOD, HOUSING, MEDICAL CARE, AND HEATING?: NOT VERY HARD

## 2024-12-06 SDOH — ECONOMIC STABILITY: FOOD INSECURITY: WITHIN THE PAST 12 MONTHS, THE FOOD YOU BOUGHT JUST DIDN'T LAST AND YOU DIDN'T HAVE MONEY TO GET MORE.: NEVER TRUE

## 2024-12-06 ASSESSMENT — ENCOUNTER SYMPTOMS
DIARRHEA: 0
EYE ITCHING: 0
VOICE CHANGE: 0
VOMITING: 0
COUGH: 0
RHINORRHEA: 0
BLOOD IN STOOL: 0
NAUSEA: 0
WHEEZING: 0
ABDOMINAL PAIN: 0
SORE THROAT: 0
SINUS PAIN: 0
NAUSEA: 0
EYE PAIN: 0
BACK PAIN: 0
ABDOMINAL DISTENTION: 0
TROUBLE SWALLOWING: 0
CHEST TIGHTNESS: 0
COUGH: 0
COLOR CHANGE: 0
RHINORRHEA: 0
CHOKING: 0
SHORTNESS OF BREATH: 0
SINUS PRESSURE: 0
FACIAL SWELLING: 0
SHORTNESS OF BREATH: 0
STRIDOR: 0
CONSTIPATION: 0
TROUBLE SWALLOWING: 0
SINUS PRESSURE: 0
DIARRHEA: 0
PHOTOPHOBIA: 0
EYE REDNESS: 0

## 2024-12-06 ASSESSMENT — PATIENT HEALTH QUESTIONNAIRE - PHQ9
SUM OF ALL RESPONSES TO PHQ9 QUESTIONS 1 & 2: 0
1. LITTLE INTEREST OR PLEASURE IN DOING THINGS: NOT AT ALL
SUM OF ALL RESPONSES TO PHQ QUESTIONS 1-9: 0
2. FEELING DOWN, DEPRESSED OR HOPELESS: NOT AT ALL
SUM OF ALL RESPONSES TO PHQ QUESTIONS 1-9: 0

## 2024-12-06 NOTE — PROGRESS NOTES
[facility]  [address] , [address]  [place] [address]  P: [phone]  F: [phone]       Patient     Robert Smith  1990    ChiefComplaint     Chief Complaint   Patient presents with    Follow-up     Check right ear tube everything has been fine       History of Present Illness     Robert Smith is a pleasant 34 y.o. male here for follow-up for tube check.  No issues with the ear.  Overall doing very well.    Past Medical History     Past Medical History:   Diagnosis Date    Chronic allergic rhinitis 05/30/2018    COVID-19     12/2020    GERD without esophagitis 05/30/2018    Hypertension     Sleep apnea     uses cpap machine       Past Surgical History     Past Surgical History:   Procedure Laterality Date    ARM SURGERY  2005    ELBOW SURGERY Right     FRACTURE SURGERY  December 2023    Right Scaphoid, OrthoCinalexandre, Jose Weeks MD    JOINT REPLACEMENT  2012 and 2022    Left ACL repair, twice    KNEE SURGERY Left     KNEE SURGERY Left 02/25/2022    LEFT KNEE ARTHROSCOPY, ANTERIOR CRUCIATE LIGAMENT RECONSTRUCTION WITH QUADRICEPS TENDON ALLOGRAFT, PARTIAL LATERAL MENISCECTOMY performed by Joel Lawler MD at [facility] OR    TONSILLECTOMY      UPPER GASTROINTESTINAL ENDOSCOPY  11/17/2016    bx    WRIST FRACTURE SURGERY         Family History     Family History   Problem Relation Age of Onset    Heart Disease Father     High Blood Pressure Father     Early Death Father         Aneurysm at 66    Other Father         Bicuspid aortic valve and ascending aorta replacement    Other Sister         Glaucoma    Cancer Maternal Grandfather         Cervical    Other Maternal Grandfather         Glaucoma    Diabetes Maternal Grandfather        Social History     Social History     Socioeconomic History    Marital status: Single     Spouse name: Not on file    Number of children: Not on file    Years of education: Not on file    Highest education level: Not on file   Occupational History    Not on

## 2024-12-07 NOTE — PROGRESS NOTES
Robert Smith   34 y.o. male   1990    HPI:    Patient was last seen by me on 12/6/2024.  During our last office visit, the main issue(s) that we focused on were:     Encounter for preventative adult health care examination  -     CBC with Auto Differential; Future  -     Hemoglobin A1C; Future  -     TSH; Future  -     T4, Free; Future  -     Renal Function Panel; Future  -     Hepatic Function Panel; Future  -     Lipid Panel; Future  -     Urinalysis with Reflex to Culture  -     MICROALBUMIN / CREATININE URINE RATIO; Future  -     Mumps, Measles, Rubella, and Varicella Zoster, IgG; Future  -     [place] OFFICE/OUTPATIENT ESTABLISHED MOD MDM 30-39 MIN     Mild concentric left ventricular hypertrophy (LVH)  Comments:  Echo done in 2018 showed signs of mild LVH (at age of 29 yo of note). This issue of LVH is likely 2/2 to long hx of JULIAN and/or elevated BP readings. He currently has no symptoms suggestive of heart failure. Will start on ARB to prevent further progression of any cardiac remodeling. Discussed that Candesartan (an ARB) has other potential beneficial properties such as \"cardiorenal protection\" and headache/migraine prophylaxis.   Orders:  -     candesartan (ATACAND) 4 MG tablet; Take 1 tablet by mouth at bedtime  -     [place] OFFICE/OUTPATIENT ESTABLISHED MOD MDM 30-39 MIN    Reason(s) for visit:   Chief Complaint   Patient presents with    Follow-up     -Interval history-    [] New health issue(s)/concern(s)/update(s):    [] No new significant health event(s)/problem(s) occurred since our last office visit.    [] No new health issues/concerns discussed during today's office visit.    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    Discussed about lab work below.    Hospital Outpatient Visit on 12/26/2024   Component Date Value Ref Range Status    Measles Immune (Igg) 12/26/2024 Immune   Final    Testing performed by multiplex bead

## 2024-12-26 ENCOUNTER — HOSPITAL ENCOUNTER (OUTPATIENT)
Age: 34
Discharge: HOME | End: 2024-12-26
Payer: COMMERCIAL

## 2024-12-26 DIAGNOSIS — Z13.21 SCREENING FOR ENDOCRINE, NUTRITIONAL, METABOLIC AND IMMUNITY DISORDER: ICD-10-CM

## 2024-12-26 DIAGNOSIS — Z13.228 SCREENING FOR ENDOCRINE, NUTRITIONAL, METABOLIC AND IMMUNITY DISORDER: ICD-10-CM

## 2024-12-26 DIAGNOSIS — Z13.0 SCREENING FOR ENDOCRINE, NUTRITIONAL, METABOLIC AND IMMUNITY DISORDER: ICD-10-CM

## 2024-12-26 DIAGNOSIS — Z13.29 SCREENING FOR ENDOCRINE, NUTRITIONAL, METABOLIC AND IMMUNITY DISORDER: ICD-10-CM

## 2024-12-26 DIAGNOSIS — Z00.00 ENCOUNTER FOR PREVENTATIVE ADULT HEALTH CARE EXAMINATION: ICD-10-CM

## 2024-12-26 PROBLEM — E78.2 MIXED HYPERLIPIDEMIA: Status: ACTIVE | Noted: 2024-12-26

## 2024-12-26 LAB
ALBUMIN SERPL-MCNC: 4.7 G/DL (ref 3.4–5)
ALP SERPL-CCNC: 31 U/L (ref 40–129)
ALT SERPL-CCNC: 70 U/L (ref 10–40)
ANION GAP SERPL CALCULATED.3IONS-SCNC: 11 MMOL/L (ref 3–16)
AST SERPL-CCNC: 35 U/L (ref 15–37)
BASOPHILS # BLD: 0 K/UL (ref 0–0.2)
BASOPHILS NFR BLD: 0.7 %
BILIRUB DIRECT SERPL-MCNC: 0.2 MG/DL (ref 0–0.3)
BILIRUB INDIRECT SERPL-MCNC: 0.4 MG/DL (ref 0–1)
BILIRUB SERPL-MCNC: 0.6 MG/DL (ref 0–1)
BUN SERPL-MCNC: 18 MG/DL (ref 7–20)
CALCIUM SERPL-MCNC: 9.4 MG/DL (ref 8.3–10.6)
CHLORIDE SERPL-SCNC: 106 MMOL/L (ref 99–110)
CHOLEST SERPL-MCNC: 212 MG/DL (ref 0–199)
CO2 SERPL-SCNC: 23 MMOL/L (ref 21–32)
CREAT SERPL-MCNC: 1.1 MG/DL (ref 0.9–1.3)
CREAT UR-MCNC: 310 MG/DL (ref 39–259)
DEPRECATED RDW RBC AUTO: 13.2 % (ref 12.4–15.4)
EOSINOPHIL # BLD: 0.1 K/UL (ref 0–0.6)
EOSINOPHIL NFR BLD: 2.3 %
EST. AVERAGE GLUCOSE BLD GHB EST-MCNC: 99.7 MG/DL
GFR SERPLBLD CREATININE-BSD FMLA CKD-EPI: >90 ML/MIN/{1.73_M2}
GLUCOSE SERPL-MCNC: 81 MG/DL (ref 70–99)
HBA1C MFR BLD: 5.1 %
HCT VFR BLD AUTO: 44.8 % (ref 40.5–52.5)
HDLC SERPL-MCNC: 34 MG/DL (ref 40–60)
HGB BLD-MCNC: 15.5 G/DL (ref 13.5–17.5)
LDLC SERPL CALC-MCNC: 121 MG/DL
LYMPHOCYTES # BLD: 1.6 K/UL (ref 1–5.1)
LYMPHOCYTES NFR BLD: 26.6 %
MCH RBC QN AUTO: 29.6 PG (ref 26–34)
MCHC RBC AUTO-ENTMCNC: 34.6 G/DL (ref 31–36)
MCV RBC AUTO: 85.7 FL (ref 80–100)
MEV IGG SER QL IA: NORMAL
MICROALBUMIN UR DL<=1MG/L-MCNC: <1.2 MG/DL
MICROALBUMIN/CREAT UR: ABNORMAL MG/G (ref 0–30)
MONOCYTES # BLD: 0.7 K/UL (ref 0–1.3)
MONOCYTES NFR BLD: 11.3 %
MUV IGG SER QL IA: NORMAL
NEUTROPHILS # BLD: 3.5 K/UL (ref 1.7–7.7)
NEUTROPHILS NFR BLD: 59.1 %
PHOSPHATE SERPL-MCNC: 3.9 MG/DL (ref 2.5–4.9)
PLATELET # BLD AUTO: 271 K/UL (ref 135–450)
PMV BLD AUTO: 8 FL (ref 5–10.5)
POTASSIUM SERPL-SCNC: 4.2 MMOL/L (ref 3.5–5.1)
PROT SERPL-MCNC: 7.5 G/DL (ref 6.4–8.2)
RBC # BLD AUTO: 5.23 M/UL (ref 4.2–5.9)
RUBV IGG SERPL QL IA: NORMAL
SODIUM SERPL-SCNC: 140 MMOL/L (ref 136–145)
T4 FREE SERPL-MCNC: 0.9 NG/DL (ref 0.9–1.8)
TRIGL SERPL-MCNC: 287 MG/DL (ref 0–150)
TSH SERPL DL<=0.005 MIU/L-ACNC: 1.56 UIU/ML (ref 0.27–4.2)
VLDLC SERPL CALC-MCNC: 57 MG/DL
VZV IGG SER QL IA: NORMAL
WBC # BLD AUTO: 6 K/UL (ref 4–11)

## 2024-12-26 PROCEDURE — 84443 ASSAY THYROID STIM HORMONE: CPT

## 2024-12-26 PROCEDURE — 86762 RUBELLA ANTIBODY: CPT

## 2024-12-26 PROCEDURE — 80069 RENAL FUNCTION PANEL: CPT

## 2024-12-26 PROCEDURE — 80061 LIPID PANEL: CPT

## 2024-12-26 PROCEDURE — 36415 COLL VENOUS BLD VENIPUNCTURE: CPT

## 2024-12-26 PROCEDURE — 86735 MUMPS ANTIBODY: CPT

## 2024-12-26 PROCEDURE — 84439 ASSAY OF FREE THYROXINE: CPT

## 2024-12-26 PROCEDURE — 86787 VARICELLA-ZOSTER ANTIBODY: CPT

## 2024-12-26 PROCEDURE — 83036 HEMOGLOBIN GLYCOSYLATED A1C: CPT

## 2024-12-26 PROCEDURE — 80076 HEPATIC FUNCTION PANEL: CPT

## 2024-12-26 PROCEDURE — 82043 UR ALBUMIN QUANTITATIVE: CPT

## 2024-12-26 PROCEDURE — 85025 COMPLETE CBC W/AUTO DIFF WBC: CPT

## 2024-12-26 PROCEDURE — 82570 ASSAY OF URINE CREATININE: CPT

## 2024-12-26 PROCEDURE — 86765 RUBEOLA ANTIBODY: CPT

## 2024-12-27 ENCOUNTER — OFFICE VISIT (OUTPATIENT)
Dept: FAMILY MEDICINE CLINIC | Age: 34
End: 2024-12-27

## 2024-12-27 VITALS
TEMPERATURE: 98 F | WEIGHT: 246 LBS | HEART RATE: 80 BPM | DIASTOLIC BLOOD PRESSURE: 80 MMHG | OXYGEN SATURATION: 98 % | BODY MASS INDEX: 37.4 KG/M2 | SYSTOLIC BLOOD PRESSURE: 121 MMHG

## 2024-12-27 DIAGNOSIS — Z71.9 HEALTH EDUCATION/COUNSELING: ICD-10-CM

## 2024-12-27 DIAGNOSIS — E78.2 MIXED HYPERLIPIDEMIA: ICD-10-CM

## 2024-12-27 DIAGNOSIS — R74.01 ELEVATED ALANINE AMINOTRANSFERASE (ALT) LEVEL: ICD-10-CM

## 2024-12-27 DIAGNOSIS — Z71.2 ENCOUNTER TO DISCUSS TEST RESULTS: Primary | ICD-10-CM

## 2024-12-27 DIAGNOSIS — G47.33 OSA (OBSTRUCTIVE SLEEP APNEA): ICD-10-CM

## 2024-12-27 DIAGNOSIS — I51.7 MILD CONCENTRIC LEFT VENTRICULAR HYPERTROPHY (LVH): ICD-10-CM

## 2024-12-27 DIAGNOSIS — Z83.2 FAMILY HISTORY OF AUTOIMMUNE DISORDER: ICD-10-CM

## 2024-12-27 RX ORDER — CANDESARTAN 4 MG/1
4 TABLET ORAL NIGHTLY
Qty: 90 TABLET | Refills: 3 | Status: SHIPPED | OUTPATIENT
Start: 2024-12-27 | End: 2025-12-22

## 2024-12-27 ASSESSMENT — ENCOUNTER SYMPTOMS
WHEEZING: 0
BACK PAIN: 0
SHORTNESS OF BREATH: 0
TROUBLE SWALLOWING: 0
CONSTIPATION: 0
NAUSEA: 0
ABDOMINAL DISTENTION: 0
SINUS PRESSURE: 0
ABDOMINAL PAIN: 0
DIARRHEA: 0
CHEST TIGHTNESS: 0
VOMITING: 0
RHINORRHEA: 0
COUGH: 0
BLOOD IN STOOL: 0

## 2025-02-02 DIAGNOSIS — I51.7 MILD CONCENTRIC LEFT VENTRICULAR HYPERTROPHY (LVH): ICD-10-CM

## 2025-02-02 RX ORDER — CANDESARTAN 4 MG/1
4 TABLET ORAL NIGHTLY
Qty: 90 TABLET | Refills: 3 | Status: SHIPPED | OUTPATIENT
Start: 2025-02-02 | End: 2026-01-28

## 2025-06-06 ENCOUNTER — OFFICE VISIT (OUTPATIENT)
Dept: ENT CLINIC | Age: 35
End: 2025-06-06
Payer: COMMERCIAL

## 2025-06-06 VITALS
BODY MASS INDEX: 37.19 KG/M2 | SYSTOLIC BLOOD PRESSURE: 123 MMHG | HEIGHT: 68 IN | WEIGHT: 245.4 LBS | DIASTOLIC BLOOD PRESSURE: 79 MMHG | TEMPERATURE: 97.2 F | HEART RATE: 55 BPM

## 2025-06-06 DIAGNOSIS — H69.91 DYSFUNCTION OF RIGHT EUSTACHIAN TUBE: ICD-10-CM

## 2025-06-06 DIAGNOSIS — Z96.22 PATENT TYMPANOSTOMY TUBE: Primary | ICD-10-CM

## 2025-06-06 PROCEDURE — 99213 OFFICE O/P EST LOW 20 MIN: CPT | Performed by: OTOLARYNGOLOGY

## 2025-06-06 PROCEDURE — G8427 DOCREV CUR MEDS BY ELIG CLIN: HCPCS | Performed by: OTOLARYNGOLOGY

## 2025-06-06 PROCEDURE — G8417 CALC BMI ABV UP PARAM F/U: HCPCS | Performed by: OTOLARYNGOLOGY

## 2025-06-06 PROCEDURE — 1036F TOBACCO NON-USER: CPT | Performed by: OTOLARYNGOLOGY

## 2025-06-06 NOTE — PROGRESS NOTES
[facility]  [address] , [address]  [place] [address]  P: [phone]  F: [phone]       Patient     Robert Smith  1990    ChiefComplaint     Chief Complaint   Patient presents with    Ear Problem     He is here for a follow up on tube in his right ear.  No current problems.       History of Present Illness     Robert Smith is a pleasant 35 y.o. male here for 6-month follow-up for tube check.  Patient had right PE tube placed in March 2023.  He did have some issues in March of this year with a URI and the left ear has cleared up.  No problems with the right ear currently.    Past Medical History     Past Medical History:   Diagnosis Date    Chronic allergic rhinitis 05/30/2018    COVID-19     12/2020    GERD without esophagitis 05/30/2018    Hypertension     Sleep apnea     uses cpap machine       Past Surgical History     Past Surgical History:   Procedure Laterality Date    ARM SURGERY  2005    ELBOW SURGERY Right     FRACTURE SURGERY  December 2023    Right Scaphoid, SuzanneCinalexandre, Jose Weeks MD    JOINT REPLACEMENT  2012 and 2022    Left ACL repair, twice    KNEE SURGERY Left     KNEE SURGERY Left 02/25/2022    LEFT KNEE ARTHROSCOPY, ANTERIOR CRUCIATE LIGAMENT RECONSTRUCTION WITH QUADRICEPS TENDON ALLOGRAFT, PARTIAL LATERAL MENISCECTOMY performed by Joel Lawler MD at [facility] OR    TONSILLECTOMY      UPPER GASTROINTESTINAL ENDOSCOPY  11/17/2016    bx    WRIST FRACTURE SURGERY         Family History     Family History   Problem Relation Age of Onset    Heart Disease Father     High Blood Pressure Father     Early Death Father         Aneurysm at 66    Other Father         Bicuspid aortic valve and ascending aorta replacement    Other Sister         Glaucoma    Cancer Maternal Grandfather         Cervical    Other Maternal Grandfather         Glaucoma    Diabetes Maternal Grandfather        Social History     Social History     Socioeconomic History    Marital status: Single

## 2025-06-15 NOTE — PROGRESS NOTES
things outside of the office visit - 10 minutes.  -I spent a significant amount of time discussing various issues as noted above and also with formulating a treatment plan for each specific issue. Patient was given the opportunity to ask me any questions and address any concerns/issues. I also reviewed lab work (if available) as well as prior notes from PCP and/or other specialists if available.   [] An  was used during today's visit.  Care and attention was made to make a conscious effort to speak in short, comprehensible phrases.  I had to (at times) repeat or rephrase what I had said to the patient and effort was made to allow ample time for both patient and  to speak without interruption.      Melvin Turk M.D.  Family Medicine  [facility]    Electronically signed by Melvin Turk MD M.D. on 6/20/2025 at 10:44 AM.

## 2025-06-20 ENCOUNTER — OFFICE VISIT (OUTPATIENT)
Dept: FAMILY MEDICINE CLINIC | Age: 35
End: 2025-06-20
Payer: COMMERCIAL

## 2025-06-20 VITALS
OXYGEN SATURATION: 97 % | TEMPERATURE: 98 F | WEIGHT: 242 LBS | HEART RATE: 73 BPM | BODY MASS INDEX: 36.8 KG/M2 | SYSTOLIC BLOOD PRESSURE: 110 MMHG | DIASTOLIC BLOOD PRESSURE: 72 MMHG

## 2025-06-20 DIAGNOSIS — I51.7 MILD CONCENTRIC LEFT VENTRICULAR HYPERTROPHY (LVH): Primary | ICD-10-CM

## 2025-06-20 DIAGNOSIS — E66.812 OBESITY, CLASS II, BMI 35-39.9, ISOLATED (SEE ACTUAL BMI): ICD-10-CM

## 2025-06-20 DIAGNOSIS — G47.33 OSA (OBSTRUCTIVE SLEEP APNEA): ICD-10-CM

## 2025-06-20 DIAGNOSIS — Z76.89 ENCOUNTER FOR WEIGHT MANAGEMENT: ICD-10-CM

## 2025-06-20 PROCEDURE — 99214 OFFICE O/P EST MOD 30 MIN: CPT | Performed by: FAMILY MEDICINE

## 2025-06-20 RX ORDER — CANDESARTAN 4 MG/1
4 TABLET ORAL NIGHTLY
Qty: 90 TABLET | Refills: 3 | Status: CANCELLED | OUTPATIENT
Start: 2025-06-20 | End: 2026-06-15

## 2025-06-20 SDOH — ECONOMIC STABILITY: FOOD INSECURITY: WITHIN THE PAST 12 MONTHS, YOU WORRIED THAT YOUR FOOD WOULD RUN OUT BEFORE YOU GOT MONEY TO BUY MORE.: NEVER TRUE

## 2025-06-20 SDOH — ECONOMIC STABILITY: FOOD INSECURITY: WITHIN THE PAST 12 MONTHS, THE FOOD YOU BOUGHT JUST DIDN'T LAST AND YOU DIDN'T HAVE MONEY TO GET MORE.: NEVER TRUE

## 2025-06-20 ASSESSMENT — ENCOUNTER SYMPTOMS
COUGH: 0
WHEEZING: 0
ABDOMINAL DISTENTION: 0
CHEST TIGHTNESS: 0
BACK PAIN: 0
VOMITING: 0
DIARRHEA: 0
BLOOD IN STOOL: 0
CONSTIPATION: 0
SHORTNESS OF BREATH: 0
ABDOMINAL PAIN: 0
RHINORRHEA: 0
TROUBLE SWALLOWING: 0
NAUSEA: 0
SINUS PRESSURE: 0

## 2025-06-20 ASSESSMENT — PATIENT HEALTH QUESTIONNAIRE - PHQ9
SUM OF ALL RESPONSES TO PHQ QUESTIONS 1-9: 0
SUM OF ALL RESPONSES TO PHQ QUESTIONS 1-9: 0
1. LITTLE INTEREST OR PLEASURE IN DOING THINGS: NOT AT ALL
SUM OF ALL RESPONSES TO PHQ QUESTIONS 1-9: 0
SUM OF ALL RESPONSES TO PHQ QUESTIONS 1-9: 0
2. FEELING DOWN, DEPRESSED OR HOPELESS: NOT AT ALL

## 2025-06-21 ENCOUNTER — PATIENT MESSAGE (OUTPATIENT)
Dept: FAMILY MEDICINE CLINIC | Age: 35
End: 2025-06-21

## (undated) DEVICE — SUTURE PROL SZ 2-0 L30IN NONABSORBABLE BLU L26MM SH 1/2 CIR 8833H

## (undated) DEVICE — Z DISCONTINUED USE 2272117 DRAPE SURG 3 QTR N INVASIVE 2 LAYR DISP

## (undated) DEVICE — 3M™ TEGADERM™ TRANSPARENT FILM DRESSING FRAME STYLE, 1626W, 4 IN X 4-3/4 IN (10 CM X 12 CM), 50/CT 4CT/CASE: Brand: 3M™ TEGADERM™

## (undated) DEVICE — COVER,TABLE,77X90,STERILE: Brand: MEDLINE

## (undated) DEVICE — ZIMMER® STERILE DISPOSABLE TOURNIQUET CUFF WITH PLC, DUAL PORT, SINGLE BLADDER, 30 IN. (76 CM)

## (undated) DEVICE — LOOP,VESSEL,MAXI,BLUE,2/PK,STERILE: Brand: MEDLINE

## (undated) DEVICE — PROBE ABLAT 50DEG ASPIR MULTIPORT BPLR RF 1 PC ELECTRD ERGO

## (undated) DEVICE — SYRINGE MED 50ML LUERLOCK TIP

## (undated) DEVICE — GOWN SIRUS NONREIN XL W/TWL: Brand: MEDLINE INDUSTRIES, INC.

## (undated) DEVICE — BUR SHV L13CM DIA4MM 8 FLUT OVL FOR RAP AGG BNE RESECT

## (undated) DEVICE — STRIP,CLOSURE,WOUND,MEDI-STRIP,1/2X4: Brand: MEDLINE

## (undated) DEVICE — PIN DRL DIA4MM ACL CLS EYELET FOR FEM FIX SYS TIGHTROPE

## (undated) DEVICE — DRILL SURG FLIPCUTTER III

## (undated) DEVICE — NEEDLE HYPO 18GA L1.5IN THN WALL PIVOTING SHLD BVL ORIENTED

## (undated) DEVICE — SUTURE MCRYL + SZ 4-0 L18IN ABSRB UD L19MM PS-2 3/8 CIR MCP496G

## (undated) DEVICE — COUNTER NDL 40 COUNT HLD 70 NUM FOAM BLK SGL MAG W BLDE REMV

## (undated) DEVICE — COVER LT HNDL BLU PLAS

## (undated) DEVICE — SUTURE TIGERSTICK TIGERWIRE SZ 2-0 L50IN NONABSORBABLE AR7209T

## (undated) DEVICE — SUTURE FIBERTAPE SZ 2-0 L36IN NONABSORBABLE BLU 2MM EA END AR7237

## (undated) DEVICE — BLADE SHV L13CM DIA4MM DISECT AGG COOLCUT

## (undated) DEVICE — INTENDED FOR TISSUE SEPARATION, AND OTHER PROCEDURES THAT REQUIRE A SHARP SURGICAL BLADE TO PUNCTURE OR CUT.: Brand: BARD-PARKER ® STAINLESS STEEL BLADES

## (undated) DEVICE — GLOVE ORANGE PI 7 1/2   MSG9075

## (undated) DEVICE — BANDAGE COBAN 6 IN WND 6INX5YD FOAM

## (undated) DEVICE — KNEE ARTHROSCOPY: Brand: MEDLINE INDUSTRIES, INC.

## (undated) DEVICE — SPONGE LAP W18XL18IN WHT COT 4 PLY FLD STRUNG RADPQ DISP ST

## (undated) DEVICE — ELECTRODE PT RET AD L9FT HI MOIST COND ADH HYDRGEL CORDED

## (undated) DEVICE — 3M™ STERI-DRAPE™ U-DRAPE 1015: Brand: STERI-DRAPE™

## (undated) DEVICE — SOLUTION IV IRRIG POUR BRL 0.9% SODIUM CHL 2F7124

## (undated) DEVICE — REAMER SURG DIA10MM LO PROF FOR MED PORTAL TECH ACL RECON

## (undated) DEVICE — TUBING PMP L16FT MAIN DISP FOR AR-6400 AR-6475

## (undated) DEVICE — PENCIL ES L3M BTTN SWCH S STL HEX LOK BLDE ELECTRD HOLSTER

## (undated) DEVICE — KNEE HOLDER DISPOSABLE LINER: Brand: ALVARADO®  KNEE SUPPORT

## (undated) DEVICE — KIT INSTR TRANSTIBIAL CRUCE W/O SAW BLDE DISP

## (undated) DEVICE — SUTURE VCRL + SZ 0 L27IN ABSRB UD CT-1 L36MM 1/2 CIR TAPR VCP260H

## (undated) DEVICE — 3M™ COBAN™ SELF-ADHERENT WRAP, 1586S, STERILE, 6 IN X 5 YD (15 CM X 4,5 M), 12 ROLLS/CASE: Brand: 3M™ COBAN™

## (undated) DEVICE — SUTURE VCRL + SZ 2-0 L27IN ABSRB CLR CT-1 1/2 CIR TAPERCUT VCP259H